# Patient Record
Sex: FEMALE | Race: WHITE | NOT HISPANIC OR LATINO | Employment: OTHER
[De-identification: names, ages, dates, MRNs, and addresses within clinical notes are randomized per-mention and may not be internally consistent; named-entity substitution may affect disease eponyms.]

---

## 2017-03-14 ENCOUNTER — RECORDS - HEALTHEAST (OUTPATIENT)
Dept: ADMINISTRATIVE | Facility: OTHER | Age: 62
End: 2017-03-14

## 2017-03-15 ENCOUNTER — RECORDS - HEALTHEAST (OUTPATIENT)
Dept: ADMINISTRATIVE | Facility: OTHER | Age: 62
End: 2017-03-15

## 2017-03-28 ENCOUNTER — HOSPITAL ENCOUNTER (OUTPATIENT)
Dept: CARDIOLOGY | Facility: CLINIC | Age: 62
Discharge: HOME | End: 2017-03-28

## 2017-05-05 ENCOUNTER — COMMUNICATION - HEALTHEAST (OUTPATIENT)
Dept: FAMILY MEDICINE | Facility: CLINIC | Age: 62
End: 2017-05-05

## 2017-05-12 ENCOUNTER — OFFICE VISIT - HEALTHEAST (OUTPATIENT)
Dept: FAMILY MEDICINE | Facility: CLINIC | Age: 62
End: 2017-05-12

## 2017-05-12 DIAGNOSIS — G43.909 MIGRAINE: ICD-10-CM

## 2017-05-12 DIAGNOSIS — E03.9 ACQUIRED HYPOTHYROIDISM: ICD-10-CM

## 2017-05-12 DIAGNOSIS — E55.9 VITAMIN D DEFICIENCY: ICD-10-CM

## 2017-05-12 DIAGNOSIS — F33.42 RECURRENT MAJOR DEPRESSIVE DISORDER, IN FULL REMISSION (H): ICD-10-CM

## 2017-05-12 DIAGNOSIS — F41.9 ANXIETY: ICD-10-CM

## 2017-05-12 DIAGNOSIS — G47.00 INSOMNIA: ICD-10-CM

## 2017-05-12 DIAGNOSIS — E78.5 HYPERLIPIDEMIA: ICD-10-CM

## 2017-05-12 DIAGNOSIS — B18.2 CHRONIC HEPATITIS C VIRUS INFECTION (H): ICD-10-CM

## 2017-05-12 LAB
CHOLEST SERPL-MCNC: 216 MG/DL
FASTING STATUS PATIENT QL REPORTED: NO
HDLC SERPL-MCNC: 51 MG/DL
LDLC SERPL CALC-MCNC: 148 MG/DL
TRIGL SERPL-MCNC: 86 MG/DL

## 2017-05-15 ENCOUNTER — COMMUNICATION - HEALTHEAST (OUTPATIENT)
Dept: FAMILY MEDICINE | Facility: CLINIC | Age: 62
End: 2017-05-15

## 2017-05-19 ENCOUNTER — RECORDS - HEALTHEAST (OUTPATIENT)
Dept: ADMINISTRATIVE | Facility: OTHER | Age: 62
End: 2017-05-19

## 2017-06-29 ENCOUNTER — COMMUNICATION - HEALTHEAST (OUTPATIENT)
Dept: FAMILY MEDICINE | Facility: CLINIC | Age: 62
End: 2017-06-29

## 2017-06-29 DIAGNOSIS — G47.00 INSOMNIA: ICD-10-CM

## 2017-08-21 ENCOUNTER — COMMUNICATION - HEALTHEAST (OUTPATIENT)
Dept: FAMILY MEDICINE | Facility: CLINIC | Age: 62
End: 2017-08-21

## 2017-08-21 DIAGNOSIS — J44.9 COPD (CHRONIC OBSTRUCTIVE PULMONARY DISEASE) (H): ICD-10-CM

## 2017-12-18 ENCOUNTER — OFFICE VISIT - HEALTHEAST (OUTPATIENT)
Dept: FAMILY MEDICINE | Facility: CLINIC | Age: 62
End: 2017-12-18

## 2017-12-18 ENCOUNTER — RECORDS - HEALTHEAST (OUTPATIENT)
Dept: ADMINISTRATIVE | Facility: OTHER | Age: 62
End: 2017-12-18

## 2017-12-18 DIAGNOSIS — Z12.31 ENCOUNTER FOR SCREENING MAMMOGRAM FOR MALIGNANT NEOPLASM OF BREAST: ICD-10-CM

## 2017-12-18 DIAGNOSIS — B18.2 CHRONIC HEPATITIS C VIRUS INFECTION (H): ICD-10-CM

## 2017-12-18 DIAGNOSIS — E78.5 HYPERLIPIDEMIA, UNSPECIFIED HYPERLIPIDEMIA TYPE: ICD-10-CM

## 2017-12-18 DIAGNOSIS — Z12.11 ENCOUNTER FOR SCREENING FOR MALIGNANT NEOPLASM OF COLON: ICD-10-CM

## 2017-12-18 DIAGNOSIS — Z12.4 ENCOUNTER FOR SCREENING FOR MALIGNANT NEOPLASM OF CERVIX: ICD-10-CM

## 2017-12-18 DIAGNOSIS — Z00.00 MEDICARE ANNUAL WELLNESS VISIT, SUBSEQUENT: ICD-10-CM

## 2017-12-18 DIAGNOSIS — Z00.01 ENCOUNTER FOR GENERAL ADULT MEDICAL EXAMINATION WITH ABNORMAL FINDINGS: ICD-10-CM

## 2017-12-18 DIAGNOSIS — J44.9 COPD (CHRONIC OBSTRUCTIVE PULMONARY DISEASE) (H): ICD-10-CM

## 2017-12-18 DIAGNOSIS — Z78.0 POSTMENOPAUSAL: ICD-10-CM

## 2017-12-18 DIAGNOSIS — E03.9 ACQUIRED HYPOTHYROIDISM: ICD-10-CM

## 2017-12-18 DIAGNOSIS — R07.9 CHEST PAIN, UNSPECIFIED TYPE: ICD-10-CM

## 2017-12-18 LAB
CHOLEST SERPL-MCNC: 221 MG/DL
FASTING STATUS PATIENT QL REPORTED: YES
HDLC SERPL-MCNC: 49 MG/DL
LDLC SERPL CALC-MCNC: 156 MG/DL
TRIGL SERPL-MCNC: 81 MG/DL

## 2017-12-18 ASSESSMENT — MIFFLIN-ST. JEOR: SCORE: 1191.36

## 2017-12-20 LAB
HUMAN PAPILLOMA VIRUS 16 DNA: NEGATIVE
HUMAN PAPILLOMA VIRUS 18 DNA: NEGATIVE
HUMAN PAPILLOMA VIRUS FINAL DIAGNOSIS: NORMAL
HUMAN PAPILLOMA VIRUS OTHER HR: NEGATIVE
SPECIMEN DESCRIPTION: NORMAL

## 2017-12-21 ENCOUNTER — COMMUNICATION - HEALTHEAST (OUTPATIENT)
Dept: FAMILY MEDICINE | Facility: CLINIC | Age: 62
End: 2017-12-21

## 2017-12-22 LAB
BKR LAB AP ABNORMAL BLEEDING: NO
BKR LAB AP BIRTH CONTROL/HORMONES: NORMAL
BKR LAB AP CERVICAL APPEARANCE: NORMAL
BKR LAB AP GYN ADEQUACY: NORMAL
BKR LAB AP GYN INTERPRETATION: NORMAL
BKR LAB AP HPV REFLEX: NORMAL
BKR LAB AP LMP: NORMAL
BKR LAB AP PATIENT STATUS: NORMAL
BKR LAB AP PREVIOUS ABNORMAL: NO
BKR LAB AP PREVIOUS NORMAL: 2014
HIGH RISK?: NO
PATH REPORT.COMMENTS IMP SPEC: NORMAL
RESULT FLAG (HE HISTORICAL CONVERSION): NORMAL

## 2017-12-26 ENCOUNTER — COMMUNICATION - HEALTHEAST (OUTPATIENT)
Dept: FAMILY MEDICINE | Facility: CLINIC | Age: 62
End: 2017-12-26

## 2018-01-04 ENCOUNTER — HOSPITAL ENCOUNTER (OUTPATIENT)
Dept: CARDIOLOGY | Facility: CLINIC | Age: 63
Discharge: HOME | End: 2018-01-04
Attending: FAMILY MEDICINE

## 2018-01-05 ENCOUNTER — AMBULATORY - HEALTHEAST (OUTPATIENT)
Dept: FAMILY MEDICINE | Facility: CLINIC | Age: 63
End: 2018-01-05

## 2018-01-05 DIAGNOSIS — R06.09 DYSPNEA ON EXERTION: ICD-10-CM

## 2018-01-08 ENCOUNTER — RECORDS - HEALTHEAST (OUTPATIENT)
Dept: BONE DENSITY | Facility: CLINIC | Age: 63
End: 2018-01-08

## 2018-01-08 ENCOUNTER — RECORDS - HEALTHEAST (OUTPATIENT)
Dept: ADMINISTRATIVE | Facility: OTHER | Age: 63
End: 2018-01-08

## 2018-01-08 ENCOUNTER — HOSPITAL ENCOUNTER (OUTPATIENT)
Dept: MAMMOGRAPHY | Facility: CLINIC | Age: 63
Discharge: HOME | End: 2018-01-08
Attending: FAMILY MEDICINE

## 2018-01-08 DIAGNOSIS — Z78.0 ASYMPTOMATIC MENOPAUSAL STATE: ICD-10-CM

## 2018-01-08 DIAGNOSIS — Z12.31 ENCOUNTER FOR SCREENING MAMMOGRAM FOR MALIGNANT NEOPLASM OF BREAST: ICD-10-CM

## 2018-01-11 ENCOUNTER — COMMUNICATION - HEALTHEAST (OUTPATIENT)
Dept: FAMILY MEDICINE | Facility: CLINIC | Age: 63
End: 2018-01-11

## 2018-02-05 ENCOUNTER — HOSPITAL ENCOUNTER (OUTPATIENT)
Dept: NUCLEAR MEDICINE | Facility: CLINIC | Age: 63
Discharge: HOME | End: 2018-02-05
Attending: FAMILY MEDICINE

## 2018-02-05 ENCOUNTER — HOSPITAL ENCOUNTER (OUTPATIENT)
Dept: CARDIOLOGY | Facility: CLINIC | Age: 63
Discharge: HOME | End: 2018-02-05
Attending: FAMILY MEDICINE

## 2018-02-05 DIAGNOSIS — R07.9 CHEST PAIN, UNSPECIFIED TYPE: ICD-10-CM

## 2018-02-05 LAB
CV STRESS CURRENT BP HE: NORMAL
CV STRESS CURRENT HR HE: 102
CV STRESS CURRENT HR HE: 106
CV STRESS CURRENT HR HE: 108
CV STRESS CURRENT HR HE: 109
CV STRESS CURRENT HR HE: 117
CV STRESS CURRENT HR HE: 119
CV STRESS CURRENT HR HE: 121
CV STRESS CURRENT HR HE: 123
CV STRESS CURRENT HR HE: 133
CV STRESS CURRENT HR HE: 136
CV STRESS CURRENT HR HE: 136
CV STRESS CURRENT HR HE: 139
CV STRESS CURRENT HR HE: 139
CV STRESS CURRENT HR HE: 142
CV STRESS CURRENT HR HE: 69
CV STRESS CURRENT HR HE: 83
CV STRESS CURRENT HR HE: 83
CV STRESS CURRENT HR HE: 84
CV STRESS CURRENT HR HE: 86
CV STRESS CURRENT HR HE: 87
CV STRESS CURRENT HR HE: 87
CV STRESS CURRENT HR HE: 89
CV STRESS CURRENT HR HE: 90
CV STRESS CURRENT HR HE: 90
CV STRESS CURRENT HR HE: 92
CV STRESS CURRENT HR HE: 93
CV STRESS CURRENT HR HE: 95
CV STRESS CURRENT HR HE: NORMAL
CV STRESS DEVIATION TIME HE: NORMAL
CV STRESS ECHO PERCENT HR HE: NORMAL
CV STRESS EXERCISE STAGE HE: NORMAL
CV STRESS EXERCISE STAGE REACHED HE: NORMAL
CV STRESS FINAL RESTING BP HE: NORMAL
CV STRESS FINAL RESTING HR HE: 90
CV STRESS MAX HR HE: 142
CV STRESS MAX TREADMILL GRADE HE: 16
CV STRESS MAX TREADMILL SPEED HE: 4.2
CV STRESS PEAK DIA BP HE: NORMAL
CV STRESS PEAK SYS BP HE: NORMAL
CV STRESS PHASE HE: NORMAL
CV STRESS PROTOCOL HE: NORMAL
CV STRESS RESTING PT POSITION HE: NORMAL
CV STRESS RESTING PT POSITION HE: NORMAL
CV STRESS ST DEVIATION AMOUNT HE: NORMAL
CV STRESS ST DEVIATION ELEVATION HE: NORMAL
CV STRESS ST EVELATION AMOUNT HE: NORMAL
CV STRESS TEST TYPE HE: NORMAL
CV STRESS TOTAL STAGE TIME MIN 1 HE: NORMAL
NUC STRESS EJECTION FRACTION: 70 %
STRESS ECHO BASELINE BP: NORMAL
STRESS ECHO BASELINE HR: 68
STRESS ECHO CALCULATED PERCENT HR: 89 %
STRESS ECHO LAST STRESS BP: NORMAL
STRESS ECHO LAST STRESS HR: 142
STRESS ECHO POST ESTIMATED WORKLOAD: 12.1
STRESS ECHO POST EXERCISE DUR MIN: 11
STRESS ECHO POST EXERCISE DUR SEC: 18
STRESS ECHO TARGET HR: 136

## 2018-06-26 ENCOUNTER — COMMUNICATION - HEALTHEAST (OUTPATIENT)
Dept: FAMILY MEDICINE | Facility: CLINIC | Age: 63
End: 2018-06-26

## 2018-06-26 DIAGNOSIS — J44.9 COPD (CHRONIC OBSTRUCTIVE PULMONARY DISEASE) (H): ICD-10-CM

## 2018-07-07 ENCOUNTER — COMMUNICATION - HEALTHEAST (OUTPATIENT)
Dept: FAMILY MEDICINE | Facility: CLINIC | Age: 63
End: 2018-07-07

## 2018-07-07 DIAGNOSIS — G47.00 INSOMNIA: ICD-10-CM

## 2018-07-09 ENCOUNTER — COMMUNICATION - HEALTHEAST (OUTPATIENT)
Dept: FAMILY MEDICINE | Facility: CLINIC | Age: 63
End: 2018-07-09

## 2018-07-09 DIAGNOSIS — G47.00 INSOMNIA: ICD-10-CM

## 2018-07-20 ENCOUNTER — COMMUNICATION - HEALTHEAST (OUTPATIENT)
Dept: FAMILY MEDICINE | Facility: CLINIC | Age: 63
End: 2018-07-20

## 2018-07-20 DIAGNOSIS — J44.9 COPD (CHRONIC OBSTRUCTIVE PULMONARY DISEASE) (H): ICD-10-CM

## 2018-11-15 ENCOUNTER — RECORDS - HEALTHEAST (OUTPATIENT)
Dept: ADMINISTRATIVE | Facility: OTHER | Age: 63
End: 2018-11-15

## 2019-03-22 ENCOUNTER — COMMUNICATION - HEALTHEAST (OUTPATIENT)
Dept: FAMILY MEDICINE | Facility: CLINIC | Age: 64
End: 2019-03-22

## 2019-05-01 ENCOUNTER — OFFICE VISIT - HEALTHEAST (OUTPATIENT)
Dept: FAMILY MEDICINE | Facility: CLINIC | Age: 64
End: 2019-05-01

## 2019-05-01 DIAGNOSIS — F33.42 RECURRENT MAJOR DEPRESSIVE DISORDER, IN FULL REMISSION (H): ICD-10-CM

## 2019-05-01 DIAGNOSIS — J44.9 COPD (CHRONIC OBSTRUCTIVE PULMONARY DISEASE) (H): ICD-10-CM

## 2019-05-01 DIAGNOSIS — B18.2 CHRONIC HEPATITIS C WITHOUT HEPATIC COMA (H): ICD-10-CM

## 2019-05-01 DIAGNOSIS — R21 RASH AND NONSPECIFIC SKIN ERUPTION: ICD-10-CM

## 2019-05-01 LAB
ALBUMIN SERPL-MCNC: 3.9 G/DL (ref 3.5–5)
ALP SERPL-CCNC: 75 U/L (ref 45–120)
ALT SERPL W P-5'-P-CCNC: 29 U/L (ref 0–45)
AST SERPL W P-5'-P-CCNC: 29 U/L (ref 0–40)
BASOPHILS # BLD AUTO: 0.1 THOU/UL (ref 0–0.2)
BASOPHILS NFR BLD AUTO: 1 % (ref 0–2)
BILIRUB DIRECT SERPL-MCNC: 0.2 MG/DL
BILIRUB SERPL-MCNC: 0.3 MG/DL (ref 0–1)
EOSINOPHIL # BLD AUTO: 0.2 THOU/UL (ref 0–0.4)
EOSINOPHIL NFR BLD AUTO: 3 % (ref 0–6)
ERYTHROCYTE [DISTWIDTH] IN BLOOD BY AUTOMATED COUNT: 11.7 % (ref 11–14.5)
HCT VFR BLD AUTO: 44.6 % (ref 35–47)
HGB BLD-MCNC: 14.8 G/DL (ref 12–16)
LYMPHOCYTES # BLD AUTO: 2 THOU/UL (ref 0.8–4.4)
LYMPHOCYTES NFR BLD AUTO: 25 % (ref 20–40)
MCH RBC QN AUTO: 31.4 PG (ref 27–34)
MCHC RBC AUTO-ENTMCNC: 33.2 G/DL (ref 32–36)
MCV RBC AUTO: 95 FL (ref 80–100)
MONOCYTES # BLD AUTO: 0.5 THOU/UL (ref 0–0.9)
MONOCYTES NFR BLD AUTO: 6 % (ref 2–10)
NEUTROPHILS # BLD AUTO: 5.3 THOU/UL (ref 2–7.7)
NEUTROPHILS NFR BLD AUTO: 66 % (ref 50–70)
PLATELET # BLD AUTO: 278 THOU/UL (ref 140–440)
PMV BLD AUTO: 8.2 FL (ref 7–10)
PROT SERPL-MCNC: 6.9 G/DL (ref 6–8)
RBC # BLD AUTO: 4.71 MILL/UL (ref 3.8–5.4)
WBC: 8.1 THOU/UL (ref 4–11)

## 2019-05-01 ASSESSMENT — MIFFLIN-ST. JEOR: SCORE: 1191.13

## 2019-05-02 LAB
HBV SURFACE AG SERPL QL IA: NEGATIVE
HCV AB SERPL QL IA: POSITIVE
HEPATITIS B SURFACE ANTIBODY LHE- HISTORICAL: NEGATIVE

## 2019-05-06 LAB
HCV RNA SERPL NAA+PROBE-ACNC: ABNORMAL [IU]/ML
HCV RNA SERPL NAA+PROBE-LOG IU: 6.8 LOG IU/ML

## 2019-05-09 ENCOUNTER — COMMUNICATION - HEALTHEAST (OUTPATIENT)
Dept: FAMILY MEDICINE | Facility: CLINIC | Age: 64
End: 2019-05-09

## 2019-05-09 LAB — HCV GENTYP SERPL NAA+PROBE: NORMAL

## 2019-05-14 ENCOUNTER — OFFICE VISIT - HEALTHEAST (OUTPATIENT)
Dept: FAMILY MEDICINE | Facility: CLINIC | Age: 64
End: 2019-05-14

## 2019-05-14 DIAGNOSIS — G89.29 CHRONIC RIGHT SHOULDER PAIN: ICD-10-CM

## 2019-05-14 DIAGNOSIS — S29.019A ACUTE THORACIC MYOFASCIAL STRAIN, INITIAL ENCOUNTER: ICD-10-CM

## 2019-05-14 DIAGNOSIS — M25.511 CHRONIC RIGHT SHOULDER PAIN: ICD-10-CM

## 2019-05-15 ENCOUNTER — COMMUNICATION - HEALTHEAST (OUTPATIENT)
Dept: FAMILY MEDICINE | Facility: CLINIC | Age: 64
End: 2019-05-15

## 2019-06-06 ENCOUNTER — RECORDS - HEALTHEAST (OUTPATIENT)
Dept: ADMINISTRATIVE | Facility: OTHER | Age: 64
End: 2019-06-06

## 2019-06-07 ENCOUNTER — RECORDS - HEALTHEAST (OUTPATIENT)
Dept: ADMINISTRATIVE | Facility: OTHER | Age: 64
End: 2019-06-07

## 2019-07-09 ENCOUNTER — RECORDS - HEALTHEAST (OUTPATIENT)
Dept: GENERAL RADIOLOGY | Facility: CLINIC | Age: 64
End: 2019-07-09

## 2019-07-09 ENCOUNTER — OFFICE VISIT - HEALTHEAST (OUTPATIENT)
Dept: FAMILY MEDICINE | Facility: CLINIC | Age: 64
End: 2019-07-09

## 2019-07-09 ENCOUNTER — COMMUNICATION - HEALTHEAST (OUTPATIENT)
Dept: FAMILY MEDICINE | Facility: CLINIC | Age: 64
End: 2019-07-09

## 2019-07-09 DIAGNOSIS — R07.81 PLEURODYNIA: ICD-10-CM

## 2019-07-09 DIAGNOSIS — R07.81 RIB PAIN: ICD-10-CM

## 2019-07-09 ASSESSMENT — MIFFLIN-ST. JEOR: SCORE: 1164.14

## 2019-12-04 ENCOUNTER — OFFICE VISIT - HEALTHEAST (OUTPATIENT)
Dept: FAMILY MEDICINE | Facility: CLINIC | Age: 64
End: 2019-12-04

## 2019-12-04 ENCOUNTER — COMMUNICATION - HEALTHEAST (OUTPATIENT)
Dept: SCHEDULING | Facility: CLINIC | Age: 64
End: 2019-12-04

## 2019-12-04 DIAGNOSIS — S29.011A INTERCOSTAL MUSCLE STRAIN, INITIAL ENCOUNTER: ICD-10-CM

## 2019-12-04 DIAGNOSIS — Z23 NEED FOR VACCINATION: ICD-10-CM

## 2019-12-04 DIAGNOSIS — J06.9 VIRAL UPPER RESPIRATORY TRACT INFECTION: ICD-10-CM

## 2019-12-11 ENCOUNTER — COMMUNICATION - HEALTHEAST (OUTPATIENT)
Dept: SCHEDULING | Facility: CLINIC | Age: 64
End: 2019-12-11

## 2019-12-11 ENCOUNTER — OFFICE VISIT - HEALTHEAST (OUTPATIENT)
Dept: FAMILY MEDICINE | Facility: CLINIC | Age: 64
End: 2019-12-11

## 2019-12-11 DIAGNOSIS — J44.9 CHRONIC OBSTRUCTIVE PULMONARY DISEASE, UNSPECIFIED COPD TYPE (H): ICD-10-CM

## 2019-12-11 DIAGNOSIS — Z72.0 TOBACCO ABUSE: ICD-10-CM

## 2019-12-11 DIAGNOSIS — R05.9 COUGH: ICD-10-CM

## 2019-12-11 ASSESSMENT — MIFFLIN-ST. JEOR: SCORE: 1181.38

## 2020-01-06 ENCOUNTER — RECORDS - HEALTHEAST (OUTPATIENT)
Dept: ADMINISTRATIVE | Facility: OTHER | Age: 65
End: 2020-01-06

## 2020-01-07 ENCOUNTER — RECORDS - HEALTHEAST (OUTPATIENT)
Dept: ADMINISTRATIVE | Facility: OTHER | Age: 65
End: 2020-01-07

## 2020-01-10 ENCOUNTER — OFFICE VISIT - HEALTHEAST (OUTPATIENT)
Dept: FAMILY MEDICINE | Facility: CLINIC | Age: 65
End: 2020-01-10

## 2020-01-10 DIAGNOSIS — B18.2 CHRONIC HEPATITIS C WITHOUT HEPATIC COMA (H): ICD-10-CM

## 2020-01-10 DIAGNOSIS — K21.9 GASTROESOPHAGEAL REFLUX DISEASE WITHOUT ESOPHAGITIS: ICD-10-CM

## 2020-01-10 DIAGNOSIS — J44.9 CHRONIC OBSTRUCTIVE PULMONARY DISEASE, UNSPECIFIED COPD TYPE (H): ICD-10-CM

## 2020-01-10 DIAGNOSIS — R94.6 ABNORMAL FINDING ON THYROID FUNCTION TEST: ICD-10-CM

## 2020-01-10 DIAGNOSIS — J01.90 ACUTE NON-RECURRENT SINUSITIS, UNSPECIFIED LOCATION: ICD-10-CM

## 2020-01-10 LAB — TSH SERPL DL<=0.005 MIU/L-ACNC: 2.25 UIU/ML (ref 0.3–5)

## 2020-06-10 ENCOUNTER — COMMUNICATION - HEALTHEAST (OUTPATIENT)
Dept: FAMILY MEDICINE | Facility: CLINIC | Age: 65
End: 2020-06-10

## 2020-06-10 DIAGNOSIS — J44.9 COPD (CHRONIC OBSTRUCTIVE PULMONARY DISEASE) (H): ICD-10-CM

## 2020-06-12 ENCOUNTER — COMMUNICATION - HEALTHEAST (OUTPATIENT)
Dept: FAMILY MEDICINE | Facility: CLINIC | Age: 65
End: 2020-06-12

## 2020-06-12 DIAGNOSIS — J44.9 COPD (CHRONIC OBSTRUCTIVE PULMONARY DISEASE) (H): ICD-10-CM

## 2020-07-07 ENCOUNTER — COMMUNICATION - HEALTHEAST (OUTPATIENT)
Dept: TELEHEALTH | Facility: CLINIC | Age: 65
End: 2020-07-07

## 2020-07-07 ENCOUNTER — OFFICE VISIT - HEALTHEAST (OUTPATIENT)
Dept: FAMILY MEDICINE | Facility: CLINIC | Age: 65
End: 2020-07-07

## 2020-07-07 DIAGNOSIS — Z79.82 ASPIRIN LONG-TERM USE: ICD-10-CM

## 2020-07-07 DIAGNOSIS — J30.2 SEASONAL ALLERGIC RHINITIS, UNSPECIFIED TRIGGER: ICD-10-CM

## 2020-07-07 DIAGNOSIS — J44.9 COPD (CHRONIC OBSTRUCTIVE PULMONARY DISEASE) (H): ICD-10-CM

## 2020-07-07 DIAGNOSIS — J44.1 COPD EXACERBATION (H): ICD-10-CM

## 2020-07-07 DIAGNOSIS — F17.200 TOBACCO DEPENDENCE: ICD-10-CM

## 2020-08-18 ENCOUNTER — COMMUNICATION - HEALTHEAST (OUTPATIENT)
Dept: FAMILY MEDICINE | Facility: CLINIC | Age: 65
End: 2020-08-18

## 2020-08-18 DIAGNOSIS — J44.1 COPD EXACERBATION (H): ICD-10-CM

## 2020-08-19 ENCOUNTER — OFFICE VISIT - HEALTHEAST (OUTPATIENT)
Dept: FAMILY MEDICINE | Facility: CLINIC | Age: 65
End: 2020-08-19

## 2020-08-19 DIAGNOSIS — J30.2 SEASONAL ALLERGIC RHINITIS, UNSPECIFIED TRIGGER: ICD-10-CM

## 2020-08-19 DIAGNOSIS — F17.200 TOBACCO DEPENDENCE: ICD-10-CM

## 2020-08-19 DIAGNOSIS — J44.1 COPD EXACERBATION (H): ICD-10-CM

## 2020-08-19 ASSESSMENT — ANXIETY QUESTIONNAIRES
7. FEELING AFRAID AS IF SOMETHING AWFUL MIGHT HAPPEN: NOT AT ALL
3. WORRYING TOO MUCH ABOUT DIFFERENT THINGS: SEVERAL DAYS
6. BECOMING EASILY ANNOYED OR IRRITABLE: NOT AT ALL
GAD7 TOTAL SCORE: 1
1. FEELING NERVOUS, ANXIOUS, OR ON EDGE: NOT AT ALL
5. BEING SO RESTLESS THAT IT IS HARD TO SIT STILL: NOT AT ALL
4. TROUBLE RELAXING: NOT AT ALL
IF YOU CHECKED OFF ANY PROBLEMS ON THIS QUESTIONNAIRE, HOW DIFFICULT HAVE THESE PROBLEMS MADE IT FOR YOU TO DO YOUR WORK, TAKE CARE OF THINGS AT HOME, OR GET ALONG WITH OTHER PEOPLE: NOT DIFFICULT AT ALL
2. NOT BEING ABLE TO STOP OR CONTROL WORRYING: NOT AT ALL

## 2020-08-19 ASSESSMENT — PATIENT HEALTH QUESTIONNAIRE - PHQ9: SUM OF ALL RESPONSES TO PHQ QUESTIONS 1-9: 1

## 2020-10-26 ENCOUNTER — OFFICE VISIT - HEALTHEAST (OUTPATIENT)
Dept: FAMILY MEDICINE | Facility: CLINIC | Age: 65
End: 2020-10-26

## 2020-10-26 DIAGNOSIS — F17.200 TOBACCO DEPENDENCE: ICD-10-CM

## 2020-10-26 DIAGNOSIS — Z23 NEED FOR VACCINATION: ICD-10-CM

## 2020-10-26 DIAGNOSIS — R06.09 DOE (DYSPNEA ON EXERTION): ICD-10-CM

## 2020-10-26 DIAGNOSIS — J30.2 SEASONAL ALLERGIC RHINITIS, UNSPECIFIED TRIGGER: ICD-10-CM

## 2020-10-26 DIAGNOSIS — Z87.09 HISTORY OF COPD: ICD-10-CM

## 2020-10-26 DIAGNOSIS — J44.1 COPD EXACERBATION (H): ICD-10-CM

## 2020-10-26 DIAGNOSIS — Z12.31 ENCOUNTER FOR SCREENING MAMMOGRAM FOR BREAST CANCER: ICD-10-CM

## 2020-10-26 RX ORDER — ALBUTEROL SULFATE 0.83 MG/ML
2.5 SOLUTION RESPIRATORY (INHALATION) EVERY 4 HOURS PRN
Qty: 25 VIAL | Refills: 7 | Status: SHIPPED | OUTPATIENT
Start: 2020-10-26 | End: 2022-11-08

## 2020-11-09 ENCOUNTER — COMMUNICATION - HEALTHEAST (OUTPATIENT)
Dept: FAMILY MEDICINE | Facility: CLINIC | Age: 65
End: 2020-11-09

## 2020-11-09 DIAGNOSIS — Z20.822 ENCOUNTER FOR LABORATORY TESTING FOR COVID-19 VIRUS: ICD-10-CM

## 2020-11-15 ENCOUNTER — AMBULATORY - HEALTHEAST (OUTPATIENT)
Dept: FAMILY MEDICINE | Facility: CLINIC | Age: 65
End: 2020-11-15

## 2020-11-15 DIAGNOSIS — Z20.822 ENCOUNTER FOR LABORATORY TESTING FOR COVID-19 VIRUS: ICD-10-CM

## 2020-11-18 ENCOUNTER — COMMUNICATION - HEALTHEAST (OUTPATIENT)
Dept: SCHEDULING | Facility: CLINIC | Age: 65
End: 2020-11-18

## 2020-11-19 ENCOUNTER — COMMUNICATION - HEALTHEAST (OUTPATIENT)
Dept: SCHEDULING | Facility: CLINIC | Age: 65
End: 2020-11-19

## 2021-05-19 ENCOUNTER — RECORDS - HEALTHEAST (OUTPATIENT)
Dept: ADMINISTRATIVE | Facility: OTHER | Age: 66
End: 2021-05-19

## 2021-05-19 ENCOUNTER — RECORDS - HEALTHEAST (OUTPATIENT)
Dept: GENERAL RADIOLOGY | Facility: CLINIC | Age: 66
End: 2021-05-19

## 2021-05-19 ENCOUNTER — OFFICE VISIT - HEALTHEAST (OUTPATIENT)
Dept: FAMILY MEDICINE | Facility: CLINIC | Age: 66
End: 2021-05-19

## 2021-05-19 DIAGNOSIS — J44.9 COPD (CHRONIC OBSTRUCTIVE PULMONARY DISEASE) (H): ICD-10-CM

## 2021-05-19 DIAGNOSIS — F13.10 BENZODIAZEPINE ABUSE (H): ICD-10-CM

## 2021-05-19 DIAGNOSIS — R07.89 ATYPICAL CHEST PAIN: ICD-10-CM

## 2021-05-19 DIAGNOSIS — M54.12 RADICULOPATHY, CERVICAL REGION: ICD-10-CM

## 2021-05-19 DIAGNOSIS — Z12.11 SCREEN FOR COLON CANCER: ICD-10-CM

## 2021-05-19 DIAGNOSIS — F17.200 TOBACCO DEPENDENCE: ICD-10-CM

## 2021-05-19 DIAGNOSIS — I10 BENIGN ESSENTIAL HYPERTENSION: ICD-10-CM

## 2021-05-19 DIAGNOSIS — M54.12 RADICULITIS, CERVICAL: ICD-10-CM

## 2021-05-19 LAB
ATRIAL RATE - MUSE: 66 BPM
DIASTOLIC BLOOD PRESSURE - MUSE: NORMAL
INTERPRETATION ECG - MUSE: NORMAL
P AXIS - MUSE: 37 DEGREES
PR INTERVAL - MUSE: 170 MS
QRS DURATION - MUSE: 88 MS
QT - MUSE: 398 MS
QTC - MUSE: 417 MS
R AXIS - MUSE: 32 DEGREES
SYSTOLIC BLOOD PRESSURE - MUSE: NORMAL
T AXIS - MUSE: 37 DEGREES
VENTRICULAR RATE- MUSE: 66 BPM

## 2021-05-19 RX ORDER — AMLODIPINE BESYLATE 5 MG/1
5 TABLET ORAL DAILY
Qty: 90 TABLET | Refills: 3 | Status: SHIPPED | OUTPATIENT
Start: 2021-05-19 | End: 2021-10-27

## 2021-05-19 RX ORDER — ALBUTEROL SULFATE 90 UG/1
AEROSOL, METERED RESPIRATORY (INHALATION)
Qty: 54 G | Refills: 3 | Status: SHIPPED | OUTPATIENT
Start: 2021-05-19 | End: 2021-10-27

## 2021-05-19 ASSESSMENT — ANXIETY QUESTIONNAIRES
4. TROUBLE RELAXING: NOT AT ALL
6. BECOMING EASILY ANNOYED OR IRRITABLE: NOT AT ALL
4. TROUBLE RELAXING: NOT AT ALL
1. FEELING NERVOUS, ANXIOUS, OR ON EDGE: NOT AT ALL
GAD7 TOTAL SCORE: 0
2. NOT BEING ABLE TO STOP OR CONTROL WORRYING: NOT AT ALL
3. WORRYING TOO MUCH ABOUT DIFFERENT THINGS: NOT AT ALL
6. BECOMING EASILY ANNOYED OR IRRITABLE: NOT AT ALL
IF YOU CHECKED OFF ANY PROBLEMS ON THIS QUESTIONNAIRE, HOW DIFFICULT HAVE THESE PROBLEMS MADE IT FOR YOU TO DO YOUR WORK, TAKE CARE OF THINGS AT HOME, OR GET ALONG WITH OTHER PEOPLE: NOT DIFFICULT AT ALL
2. NOT BEING ABLE TO STOP OR CONTROL WORRYING: NOT AT ALL
5. BEING SO RESTLESS THAT IT IS HARD TO SIT STILL: NOT AT ALL
7. FEELING AFRAID AS IF SOMETHING AWFUL MIGHT HAPPEN: NOT AT ALL
7. FEELING AFRAID AS IF SOMETHING AWFUL MIGHT HAPPEN: NOT AT ALL
IF YOU CHECKED OFF ANY PROBLEMS ON THIS QUESTIONNAIRE, HOW DIFFICULT HAVE THESE PROBLEMS MADE IT FOR YOU TO DO YOUR WORK, TAKE CARE OF THINGS AT HOME, OR GET ALONG WITH OTHER PEOPLE: NOT DIFFICULT AT ALL
3. WORRYING TOO MUCH ABOUT DIFFERENT THINGS: NOT AT ALL
1. FEELING NERVOUS, ANXIOUS, OR ON EDGE: NOT AT ALL
5. BEING SO RESTLESS THAT IT IS HARD TO SIT STILL: NOT AT ALL

## 2021-05-19 ASSESSMENT — MIFFLIN-ST. JEOR: SCORE: 1166.07

## 2021-05-19 ASSESSMENT — PATIENT HEALTH QUESTIONNAIRE - PHQ9: SUM OF ALL RESPONSES TO PHQ QUESTIONS 1-9: 2

## 2021-05-27 ENCOUNTER — RECORDS - HEALTHEAST (OUTPATIENT)
Dept: ADMINISTRATIVE | Facility: CLINIC | Age: 66
End: 2021-05-27

## 2021-05-27 VITALS — WEIGHT: 147 LBS | BODY MASS INDEX: 27.1 KG/M2 | HEIGHT: 62 IN

## 2021-05-27 VITALS
DIASTOLIC BLOOD PRESSURE: 94 MMHG | SYSTOLIC BLOOD PRESSURE: 180 MMHG | OXYGEN SATURATION: 97 % | HEART RATE: 73 BPM | DIASTOLIC BLOOD PRESSURE: 96 MMHG | SYSTOLIC BLOOD PRESSURE: 168 MMHG

## 2021-05-28 ENCOUNTER — RECORDS - HEALTHEAST (OUTPATIENT)
Dept: ADMINISTRATIVE | Facility: CLINIC | Age: 66
End: 2021-05-28

## 2021-05-28 ASSESSMENT — ANXIETY QUESTIONNAIRES: GAD7 TOTAL SCORE: 1

## 2021-05-28 NOTE — PROGRESS NOTES
Assessment & Plan:     1. Acute thoracic myofascial strain, initial encounter  cyclobenzaprine (FLEXERIL) 10 MG tablet   2. Chronic right shoulder pain           We reviewed the likely/potential etiology(ies) for her shoulder and upper back pain symptoms and we will have her begin a trial of flexeril as directed, and I advised her to begin oral aleve on a scheduled basis for the next 5-7 days, then prn. We reviewed activity as tolerated and use of heat and/or ice [id number] for comfort. She will call or return to clinic with any ongoing or worsening symptoms. I explained that this may be her shoulder acting up and would recommend ortho evaluation for ongoing or worsening symptoms.          Subjective:         Nettie Paul is a 64 y.o. female who presents for evaluation of right upper back and chest pain. Symptoms have been present for 3 days and are gradually worsening. It was severe on awakening yest and she slept poorly last night due to the pain. She had been planting and mulching on Mother's day, then picked up baby and felt sore. She does not recall a specific injury but does have a history of chronic neck and back pain as well as a history of right shoulder pain and brachial plexus injury. She is s/p right shoulder surgery in 2011. She has been seeing [facility] for the shoulder under work comp for years, but reports that this doesn't feel like her shoulder pain. She was last seen there in October 2018 with left shoulder symptoms. The pain is worse with coughing and she needs to splint the area when she coughs. She tried some aleve, tylenol and a pain patch over the area without much change.       Hx neck fx and chronic back pain - Has had issues with opioids and benzos in the past and does not want to take any of those. She has COPD with a chronic cough, and some recent allergy symptoms. She is working on stopping smoking.       The following portions of the patient's history were reviewed and  updated as appropriate: allergies, current medications, past family history, past medical history, past social history, past surgical history and problem list.    Review of Systems  A 12 point comprehensive review of systems was negative except as noted.      Objective:      Vitals:    05/14/19 0958   BP: 118/68   Patient Position: Sitting   Cuff Size: Adult Large   Pulse: 68   SpO2: 97%   Weight: 148 lb (67.1 kg)     GEN: Alert and oriented, NAD, well nourished  SKIN:  Normal skin turgor, no lesions/rashes   HEENT: NC/AT, moist mucous membranes, no rhinorrhea.    NECK: Normal.  No adenopathy or thyromegaly.  CV: Regular rate and rhythm, no murmurs.   LUNGS: Clear to auscultation bilaterally.    ABDOMEN: Soft, non-tender, non-distended, no masses   BACK: Normal  EXTREMITY: No edema, cyanosis. Right AC and biceps tenderness, mild impingement signs.   Full ROM at shoulder but some pain with int/ext rotation.  NEURO: Grossly normal.

## 2021-05-28 NOTE — PROGRESS NOTES
ASSESSMENT/PLAN:       1. Recurrent major depressive disorder, in full remission (H)  Encouraged her on her ability to stop her alcohol use and her active engagement in smoking cessation.  I think it is best that she is off of her chronic opioids and also benzodiazepines.    2. Chronic hepatitis C without hepatic coma (H)    - Hepatitis C Antibody (Anti-HCV)  - Hepatitis B Surface Antigen (HBsAG)  - Hepatitis B Surface Antibody (Anti-HBs)  - Hepatic Profile  - Hepatitis C RNA Quantitation by RT-PCR  - Hepatitis C Virus High-Resolution Genotype by Sequencing  - 1(CBC and Differential)  - Ambulatory referral to Gastroenterology  - [facility] (CBC with Diff)  Referral to [facility] to see if she might be a candidate for treatment of chronic hepatitis C.  There may be other testing that they want to do prior to making that decision is certainly she would need to make sure that her insurance will cover the treatment.  She does have Medicare and a Blue Cross supplement    3. COPD (chronic obstructive pulmonary disease) (H)    - albuterol (VENTOLIN HFA) 90 mcg/actuation inhaler; Inhale 2 puffs every 6 (six) hours as needed for wheezing.  Dispense: 18 g; Refill: 11  Encouraged her on her smoking cessation    4. Rash and nonspecific skin eruption  I explained to her that I did not feel that this rash was likely a herpes zoster or herpes simplex infection because primarily had some itching and not pain and it also is almost gone now about 5 days after it started.  Encouraged her to use some hydrocortisone cream over-the-counter as needed for itching and a good moisturizer cream    Test results by letter and less red flag results then call her        I have counseled the patient for tobacco cessation and the follow up will occur  at the next visit.    Martín Marie MD      PROGRESS NOTE   5/1/2019    SUBJECTIVE:  Nettie Paul is a 64 y.o. female  who presents for   Chief Complaint   Patient presents with     Medication  Problem     refill for inhaler     Hepatitis C     wants to talk about Hep C     Herpes Zoster     wants to talk about shingles       1. Recurrent major depressive disorder, in full remission (H)  The patient has had a history of depression but is in full remission and her PHQ 9 was completed and in the normal range.  She is not not drinking any alcohol chronic pain medications.    2. Chronic hepatitis C without hepatic coma (H)  The patient has a concern about her chronic hepatitis C  A number of years ago she did take the treatment for hepatitis C for about a year I suspect it was interferon and it was not effective and had a lot of side effects.  She is aware of the new medication and is wondering if she would be a candidate to try that.  I note that last fall she had normal AST and ALT.  She does not have any right upper quadrant abdominal pain and her appetite and weight have been stable.    3. COPD (chronic obstructive pulmonary disease) (H)  The patient is needing a refill on her albuterol.  She is having some coughing and wheezing and has been out of her albuterol.  She has cut back from a pack of cigarettes a day to 3 cigarettes.  The cough has some productive colored sputum but that is really not too much out of the ordinary for her.    4. Rash and nonspecific skin eruption  About 5 days ago the patient had a rash on the with some itching no pain and she is concerned that it might be shingles.  It does seem to be getting better.  She is never had shingles before  Patient Active Problem List   Diagnosis     Chronic Hepatitis, C Virus     Hyperlipidemia     Hypothyroidism     Vitamin D deficiency     Tobacco dependence     COPD (chronic obstructive pulmonary disease) (H)     Major depression, recurrent (H)     Anxiety     Insomnia     Alcohol abuse     Opioid abuse (H)     Benzodiazepine abuse (H)     Migraine     Osteopenia of multiple sites       Current Outpatient Medications   Medication Sig Dispense  "Refill     acetaminophen (TYLENOL) 325 MG tablet Take 650 mg by mouth every 6 (six) hours as needed for pain.       albuterol (VENTOLIN HFA) 90 mcg/actuation inhaler Inhale 2 puffs every 6 (six) hours as needed for wheezing. 18 g 11     No current facility-administered medications for this visit.        Social History     Tobacco Use   Smoking Status Current Some Day Smoker     Packs/day: 0.50     Types: Cigarettes     Last attempt to quit: 2016     Years since quittin.7   Smokeless Tobacco Never Used   Tobacco Comment    using patches in between; given smokiing cessation information           OBJECTIVE:        Recent Results (from the past 240 hour(s))   HM1 (CBC with Diff)   Result Value Ref Range    WBC 8.1 4.0 - 11.0 thou/uL    RBC 4.71 3.80 - 5.40 mill/uL    Hemoglobin 14.8 12.0 - 16.0 g/dL    Hematocrit 44.6 35.0 - 47.0 %    MCV 95 80 - 100 fL    MCH 31.4 27.0 - 34.0 pg    MCHC 33.2 32.0 - 36.0 g/dL    RDW 11.7 11.0 - 14.5 %    Platelets 278 140 - 440 thou/uL    MPV 8.2 7.0 - 10.0 fL    Neutrophils % 66 50 - 70 %    Lymphocytes % 25 20 - 40 %    Monocytes % 6 2 - 10 %    Eosinophils % 3 0 - 6 %    Basophils % 1 0 - 2 %    Neutrophils Absolute 5.3 2.0 - 7.7 thou/uL    Lymphocytes Absolute 2.0 0.8 - 4.4 thou/uL    Monocytes Absolute 0.5 0.0 - 0.9 thou/uL    Eosinophils Absolute 0.2 0.0 - 0.4 thou/uL    Basophils Absolute 0.1 0.0 - 0.2 thou/uL       Vitals:    19 0904 19 0909   BP: 120/90 122/85   Pulse: 67    SpO2: 97%    Weight: 149 lb 14.4 oz (68 kg)    Height: 5' 2.5\" (1.588 m)      Weight: 149 lb 14.4 oz (68 kg)          Physical Exam:  GENERAL APPEARANCE: 64-year-old female very pleasant, NAD, well hydrated, well nourished  SKIN:  Normal skin turgor, rash on the left buttock reveals 3 or 4 crusty areas with no surrounding erythema and no drainage.  They really look like they are resolving.  CV: RRR, no M/G/R   LUNGS: Symmetrically decreased breath sounds with some rhonchi and scattered " expiratory wheezing.  ABDOMEN: S&NT, no masses or enlarged organs   EXTREMITY: no edema and full ROM of all joints  NEURO: no focal findings

## 2021-05-28 NOTE — TELEPHONE ENCOUNTER
Central PA team  [phone]  Pool: HE PA MED ([id number])          PA has been initiated.       PA form completed and faxed insurance via Cover My Meds     Key:  [id number]     Medication:  CYCLOBENZAPRINE 10MG    Insurance:  [employer]        Response will be received via fax and may take up to 5-10 business days depending on plan

## 2021-05-28 NOTE — TELEPHONE ENCOUNTER
Fax received from [facility], they have started the Prior Authorization Process via Cover My Meds    CoverMyMeds Key: [id number]    Medication Name:   cyclobenzaprine (FLEXERIL) 10 MG tablet 45 tablet 1 5/14/2019     Sig - Route: Take 1 tablet (10 mg total) by mouth 3 (three) times a day as needed for muscle spasms. - Oral          Insurance Plan: BC   PBM:   Patient ID: not provided on fax    Please complete the PA process

## 2021-05-30 ENCOUNTER — RECORDS - HEALTHEAST (OUTPATIENT)
Dept: ADMINISTRATIVE | Facility: CLINIC | Age: 66
End: 2021-05-30

## 2021-05-30 NOTE — TELEPHONE ENCOUNTER
Tell patient that there is no evidence of a rib fracture and that it most likely represents a bruised rib or a fracture that we cannot see, as we discussed in the office.  Recommended over-the-counter analgesics, rest and notify us if symptoms of infection or pneumonia occur.

## 2021-05-30 NOTE — PROGRESS NOTES
"Chief Complaint   Patient presents with     Pain     slipped and falled on her right rib. started on the 4th       HPI: 64-year-old female presents today with right rib pain.  She slipped mechanically while playing with her grandchildren on July 4 and continues to have pain in her right rib.  There is been no shortness of breath.  She continues to struggle with COPD and has taken inhalers occasionally which is common for her.    In addition, she has seen gastroenterology and tells me that she starting on therapy for her hepatitis C.    ROS: No fever.  No sputum production.  No shortness of breath.    SH:    reports that she has been smoking cigarettes.  She has been smoking about 0.50 packs per day. She has never used smokeless tobacco. She reports that she does not drink alcohol or use drugs.      FH: The Patient's family history includes Alcohol abuse in her father and mother; Cirrhosis in her mother; Coronary artery disease in her father and sister; Hypertension in her brother; Seizures in her sister.     Meds:  Nettie has a current medication list which includes the following prescription(s): acetaminophen, albuterol, and cyclobenzaprine.    O:  /80   Pulse 73   Temp 98.1  F (36.7  C)   Ht 5' 3\" (1.6 m)   Wt 142 lb 3.2 oz (64.5 kg)   SpO2 98%   BMI 25.19 kg/m       Lungs--Clear to Auscultation with only occasional adventitious sounds.  Heart--Regular rate and rhythm  Abdomen--Soft, non-tender, non-distended  Skin-Pink and dry   Thorax-pain to palpation over the right fifth or sixth rib at the mid axillary line.  Pain is both with AP and lateral compression.    Review of the x-ray of the chest and ribs show no evidence of fracture space or soft tissue swelling    A/P:   1. Rib pain  The patient most likely has a rib fracture.  Discussed over-the-counter analgesics, and alerted her to signs and symptoms of possible pneumonia.  If those should occur at any time she should return.  Patient will follow-up " as needed      2.  Hepatitis C  -Continue following up with GI concerning the use of medications.

## 2021-05-30 NOTE — TELEPHONE ENCOUNTER
Who is calling:  Patient   Reason for Call:  Patient is wondering if her results are in and what she should do about her rib  Date of last appointment with primary care: 07/09/19  Okay to leave a detailed message: Yes

## 2021-05-31 VITALS — BODY MASS INDEX: 27.05 KG/M2 | WEIGHT: 147.9 LBS

## 2021-05-31 VITALS — BODY MASS INDEX: 26.26 KG/M2 | WEIGHT: 148.2 LBS | HEIGHT: 63 IN

## 2021-06-02 ENCOUNTER — RECORDS - HEALTHEAST (OUTPATIENT)
Dept: ADMINISTRATIVE | Facility: CLINIC | Age: 66
End: 2021-06-02

## 2021-06-03 VITALS — HEIGHT: 63 IN | WEIGHT: 142.2 LBS | BODY MASS INDEX: 25.2 KG/M2

## 2021-06-03 VITALS — WEIGHT: 149.9 LBS | HEIGHT: 63 IN | BODY MASS INDEX: 26.56 KG/M2

## 2021-06-03 VITALS — WEIGHT: 148 LBS | BODY MASS INDEX: 26.64 KG/M2

## 2021-06-03 NOTE — TELEPHONE ENCOUNTER
RN triage   Call from pt   Pt states she has hx of bronchitis and hx of pneumonia -- and feels like that   Pt states she has hx of COPD   Cough -- no fever - lungs hurt w/ deep breathing -- sharp -- denies chest pain   Pt states she is wheezing   Pt she uses albuterol inhaler a couple of times/ week   Reviewed home care advice   Per protocol = should be seen   Transferred to    Karma Parra RN [facility] RN triage      Reason for Disposition    Wheezing is present    Protocols used: COUGH-A-OH

## 2021-06-04 VITALS
HEART RATE: 80 BPM | OXYGEN SATURATION: 94 % | HEIGHT: 63 IN | DIASTOLIC BLOOD PRESSURE: 74 MMHG | TEMPERATURE: 98.9 F | BODY MASS INDEX: 25.87 KG/M2 | WEIGHT: 146 LBS | SYSTOLIC BLOOD PRESSURE: 132 MMHG

## 2021-06-04 VITALS
SYSTOLIC BLOOD PRESSURE: 135 MMHG | BODY MASS INDEX: 25.01 KG/M2 | DIASTOLIC BLOOD PRESSURE: 75 MMHG | OXYGEN SATURATION: 99 % | TEMPERATURE: 98.4 F | WEIGHT: 141.2 LBS | HEART RATE: 61 BPM

## 2021-06-04 NOTE — PROGRESS NOTES
Chief Complaint   Patient presents with     Shortness of Breath     Was seen last week for symptoms. Did not get anything prescribed. Feels that symptoms have gotten worse.      Flank Pain     Has pain on right side.      Nasal Congestion     Chills       HPI: Patient presents today with complaints of nasal congestion, chills, cough, and increased shortness of breath.  She was evaluated for similar symptoms on 12/4/2019 and it was thought that most likely this was a viral process.  Unfortunately the patient's symptoms have only continue to worsen.  The cough is intermittently dry and productive.  While she denies chest pain, she does have a little bit of achiness with deep coughing jags along her right flank which was noted at the last office visit.  She feels like the pain is exacerbated when she pushes on the area along the lower ribs.  No hemoptysis.  No known sick contacts.  Vital signs stable today.  She does have a known history of COPD and continues to smoke about 1/2 pack/day.    ROS:Review of Systems - negative except for what's listed in the HPI    SH: The Patient's  reports that she has been smoking cigarettes. She has been smoking about 0.50 packs per day. She has never used smokeless tobacco. She reports that she does not drink alcohol or use drugs.      FH: The Patient's family history includes Alcohol abuse in her father and mother; Cirrhosis in her mother; Coronary artery disease in her father and sister; Hypertension in her brother; Seizures in her sister.     Meds:    Current Outpatient Medications on File Prior to Visit   Medication Sig Dispense Refill     acetaminophen (TYLENOL) 325 MG tablet Take 650 mg by mouth every 6 (six) hours as needed for pain.       albuterol (VENTOLIN HFA) 90 mcg/actuation inhaler Inhale 2 puffs every 6 (six) hours as needed for wheezing. 18 g 11     No current facility-administered medications on file prior to visit.        O:  /74   Pulse 80   Temp 98.9  F  "(37.2  C) (Oral)   Ht 5' 3\" (1.6 m)   Wt 146 lb (66.2 kg)   SpO2 94%   BMI 25.86 kg/m      Physical Examination:   General appearance - alert, well appearing, and in no distress  Mental status - alert, oriented to person, place, and time  Eyes -PERRLA, conjunctiva pink  Ears - bilateral TM's and external ear canals normal  Nose - normal and patent, no erythema, discharge or polyps  Mouth - mucous membranes moist, pharynx normal  Neck - no significant adenopathy  Lymphatics - no palpable lymphadenopathy, no hepatosplenomegaly  Chest -inspiratory wheezes heard throughout.  No crackles.  Diminished breath sounds amongst the lower lobes.  Heart - normal rate and regular rhythm, S1 and S2 normal, no murmurs noted  Musculoskeletal-increased discomfort with deep palpation along the right lateral ribs.  Extremities - peripheral pulses normal, no pedal edema, no cyanosis  Skin - normal coloration and turgor.      A/P:     Problem List Items Addressed This Visit        ENT/CARD/PULM/ENDO Problems    COPD (chronic obstructive pulmonary disease) (H) (Chronic)      Other Visit Diagnoses     Cough    -  Primary    Relevant Medications    predniSONE (DELTASONE) 20 MG tablet    azithromycin (ZITHROMAX Z-BRANDON) 250 MG tablet    Other Relevant Orders    XR Chest 2 Views (Completed)    Tobacco abuse            Given worsening symptoms and history of COPD, x-ray ordered to rule out pneumonia.  This is negative but does show hyperinflation consistent with COPD.  Given this and wheezing, start with steroids.  Prednisone sent to the pharmacy to take daily.  Follow-up with azithromycin concurrently.  Continue with as needed albuterol.  Discussed with the patient that should symptoms fail to improve or she gets recurrent respiratory illnesses like this, she will want to start a maintenance inhaler.  Strongly encouraged tobacco cessation and the patient is making plans to address this in 2020.    1. Cough  - XR Chest 2 Views  - predniSONE " (DELTASONE) 20 MG tablet; Take 20 mg by mouth daily.  Dispense: 7 tablet; Refill: 0  - azithromycin (ZITHROMAX Z-BRANDON) 250 MG tablet; Take 2 tablets (500 mg) on  Day 1,  followed by 1 tablet (250 mg) once daily on Days 2 through 5.  Dispense: 6 tablet; Refill: 0    2. Chronic obstructive pulmonary disease, unspecified COPD type (H)    3. Tobacco abuse        Eric Oliveira, CNP

## 2021-06-04 NOTE — TELEPHONE ENCOUNTER
"Breathing hurts and cold is worse since seen by pcp on 12/4/19.    Using inhalers a couple times per day.    Currently coughing all day and night.  Phlegm is \"dark color\".    Fever yesterday and last night.  Did not take temp but it comes and goes.  Ears are plugged, not pain from blowing nose.    Drinking lots of fluids water, juice, etc.    Patient is calling to see if pcp can call in an antibiotic for her. She declined offer to be reseen.    Pharmacy confirmed.    Ok to leave detailed message    Susan Reyez RN Triage and refills    "

## 2021-06-04 NOTE — PATIENT INSTRUCTIONS - HE
Lungs show no evidence of pneumonia!    Because of your COPD history, it important that we get in front of this.  I did send a prescription of prednisone to the pharmacy along with the antibiotic azithromycin.    If you are getting worse over the weekend, get checked out again.    Quitting smoking is the single most important thing you can do for your long-term health!    I usually comment on labs and imaging after they are all resulted within 2 business days. If you haven't heard your results within a week, please contact the office.    Thank you for coming in today!    If you receive a survey from [employer] about your experience today, it would be very helpful if you could fill it out to let us know what went well and what we can improve!    General Information:    Today you had your appointment with Eric Oliveira NP    My hours are:    Monday : Out of clinic  Tuesday : 8:00AM - 5:00 PM  Wednesday: 8:00AM - 5:00 PM  Thursday: 8:00AM - 5:00 PM  Friday: 8:00AM - 5:00 PM    I am not in the office Mondays. Therefore non-urgent calls and medical messages received on Monday will be addressed when I am back in the office on Tuesday. Urgent matters will be reviewed and addressed by one of my partners in the office as needed.    If lab work was done today as part of your evaluation you will generally be contacted via [email or web address], mail, or phone with the results within 1-5 days. If there is an alarming result we will contact you by phone. Lab results come back at varying times, I generally wait until all lab results are available before making comments on the results.     If you need refills please contact your pharmacy. They will send a refill request to me to review. Please allow 3-5 business days for us to process all refill requests.     My Clinical Assistant is Patty. Please call us at [phone] or send a medical message with any questions or concerns.

## 2021-06-04 NOTE — TELEPHONE ENCOUNTER
Reason for Disposition    Sinus congestion (pressure, fullness) present > 10 days    Protocols used: COMMON COLD-A-OH

## 2021-06-04 NOTE — PROGRESS NOTES
ASSESSMENT/PLAN:       1. Need for vaccination  Influenza vaccine given    2. Intercostal muscle strain, initial encounter  Symptomatic treatment with relative rest  Okay to use some moist heat  Acetaminophen for pain but not to exceed 3000 mg daily  I do not feel that x-rays are needed today    3. Viral upper respiratory tract infection  This likely is viral in etiology and encourage good amounts of fluids  Long hot showers  Increased humidification in the house  And use of her albuterol inhaler every 6 hours as needed.  If the patient continues to have recurrent episodes of respiratory symptoms that flare should proceed with spirometry at a minimum or complete pulmonary function tests as the patient may benefit from a daily medication such as a anticholinergic inhaler.        Martín Marie MD      PROGRESS NOTE   12/4/2019    SUBJECTIVE:  Nettie Paul is a 64 y.o. female  who presents for   Chief Complaint   Patient presents with     Cough     having some wheezing, hurting on side and ribs when breathing     1. Need for vaccination    The patient is agreeable to the influenza vaccine.  The patient was given the PCV 13 vaccine 2 years ago     2. Intercostal muscle strain, initial encounter  For the last day the patient has noticed pain in the chest wall.  It hurts to move her upper body take a deep breath or cough.  The symptoms may have been related to shoveling snow recently.  She does not have shortness of breath but does have a cough and some wheezing.  Note that in July of this year the patient had some right-sided chest wall pain and had x-rays of her ribs and chest which were revealing of some old right-sided rib fractures but no acute fractures.    3. Viral upper respiratory tract infection  Cough is nonproductive and has a albuterol inhaler but has not used it much for symptoms.  Lots of exposure to others in her family that have been ill with similar symptoms.  Patient Active Problem List    Diagnosis     Chronic Hepatitis, C Virus     Hyperlipidemia     Hypothyroidism     Vitamin D deficiency     Tobacco dependence     COPD (chronic obstructive pulmonary disease) (H)     Major depression, recurrent (H)     Anxiety     Insomnia     Alcohol abuse     Opioid abuse (H)     Benzodiazepine abuse (H)     Migraine     Osteopenia of multiple sites     Chronic right shoulder pain       Current Outpatient Medications   Medication Sig Dispense Refill     acetaminophen (TYLENOL) 325 MG tablet Take 650 mg by mouth every 6 (six) hours as needed for pain.       albuterol (VENTOLIN HFA) 90 mcg/actuation inhaler Inhale 2 puffs every 6 (six) hours as needed for wheezing. 18 g 11     No current facility-administered medications for this visit.        Social History     Tobacco Use   Smoking Status Current Some Day Smoker     Packs/day: 0.50     Types: Cigarettes     Last attempt to quit: 7/23/2016     Years since quitting: 3.3   Smokeless Tobacco Never Used   Tobacco Comment    using patches in between; given smokiing cessation information           OBJECTIVE:        No results found for this or any previous visit (from the past 240 hour(s)).    Vitals:    12/04/19 1011   BP: 135/75   Pulse: 61   Temp: 98.4  F (36.9  C)   SpO2: 99%   Weight: 141 lb 3.2 oz (64 kg)     Weight: 141 lb 3.2 oz (64 kg)          Physical Exam:  GENERAL APPEARANCE: 64-year-old female, NAD, well hydrated, well nourished  SKIN:  Normal skin turgor, no lesions/rashes with particular attention to the right chest wall  HEENT: moist mucous membranes, no rhinorrhea, tympanic membranes are normal and oropharynx is normal  NECK: Normal without adenopathy or masses  CV: RRR, no M/G/R   LUNGS: Symmetrically decreased breath sounds without rales rhonchi or wheezes.  She does have tenderness very localized at about the sixth rib at the posterior axillary line right side without crepitus swelling or bruising in that area.  ABDOMEN: S&NT, no masses or  enlarged organs   EXTREMITY: no edema and full ROM of all joints  NEURO: no focal findings

## 2021-06-04 NOTE — TELEPHONE ENCOUNTER
It sounds like the patient is worse than when I saw her a week ago and if she has fever she needs to be seen in the clinic today.  Please help her get an appointment to be seen today.  Thank you,  Dr. Marie

## 2021-06-05 NOTE — PROGRESS NOTES
Please call the patient and inform her that her TSH was normal.  This means there is no thyroid dysfunction.  Good news and no other intervention needed at this time.  Thank you,  Dr. Marie

## 2021-06-05 NOTE — PROGRESS NOTES
ASSESSMENT/PLAN:       1. Gastroesophageal reflux disease without esophagitis     - omeprazole (PRILOSEC) 20 MG capsule; Take 1 capsule (20 mg total) by mouth daily before breakfast.  Dispense: 30 capsule; Refill: 5  Try to eat small more frequent meals  Avoid eating for 2 hours before going to sleep  Limit caffeine    2. Abnormal finding on thyroid function test     - Thyroid Stimulating Hormone (TSH)  Call patient with test results    3. Acute non-recurrent sinusitis, unspecified location     - doxycycline (MONODOX) 100 MG capsule; Take 1 capsule (100 mg total) by mouth 2 (two) times a day for 7 days.  Dispense: 14 capsule; Refill: 0  Drink good amounts of water  Long hot steam  Keep house well humidified  Continued efforts to stop smoking discussed    4. Chronic obstructive pulmonary disease, unspecified COPD type (H)  The patient does not use her albuterol inhaler very frequently and I do feel that in the near future it would be a good idea to do pulmonary spirometry to get a baseline on her lung function.    5. Chronic hepatitis C without hepatic coma (H)  The test results from her recent blood work were shared with her and she is very pleased that the treatment seems to have been successful at eradicating hepatitis C and her blood.  She apparently also has some studies done such as an ultrasound and testing for fibrosis which were favorable.  The hepatic steatosis has also improved.        I have counseled the patient for tobacco cessation and the follow up will occur  at the next visit.    Martín Marie MD      PROGRESS NOTE   1/11/2020    SUBJECTIVE:  Nettie Paul is a 64 y.o. female  who presents for   Chief Complaint   Patient presents with     Follow-up     medication check, talk about thyroid and check it     1. Gastroesophageal reflux disease without esophagitis  The last month the patient had significant symptoms of acid reflux with some chest pain that she describes as burning as well as a  funny taste in her mouth and also even just a decrease in her taste and smell.  She does not have any dysphasia and she has been taking over-the-counter antacid such as Tums with some regularity.  Symptom seem to be worse at night and are not related to physical activity.    2. Abnormal finding on thyroid function test  The patient is wanting her thyroid to be checked because years ago when she was treated for hepatitis C it caused abnormal thyroid function and she needed to be on thyroid replacement for short time.    3. Acute non-recurrent sinusitis, unspecified location  The patient has had 3 months of nasal and sinus congestion with postnasal drainage and some sinus pain.  Her symptoms have been worse over the last 2 weeks.  About a year long symptoms with cough and was treated with prednisone as well as azithromycin which helped the cough and respiratory symptoms in her chest but not the sinus symptoms.    4. Chronic obstructive pulmonary disease, unspecified COPD type (H)  The patient currently uses albuterol inhaler on a as needed basis for her COPD.  At some point in the future she should have spirometry done.  She has been successful at significantly decreasing her tobacco usage and hopes to quit in the very near future.  Presently less than a fourth of a pack of cigarettes a day.    5. Chronic hepatitis C without hepatic coma (H)  The patient has completed her treatment with [facility] for her chronic hepatitis C and [facility] send copies of her labs which showed normal hepatic function and no evidence for hepatitis C in her blood.    Patient Active Problem List   Diagnosis     Chronic Hepatitis, C Virus     Hyperlipidemia     Hypothyroidism     Vitamin D deficiency     Tobacco dependence     COPD (chronic obstructive pulmonary disease) (H)     Major depression, recurrent (H)     Anxiety     Insomnia     Alcohol abuse     Opioid abuse (H)     Benzodiazepine abuse (H)     Migraine     Osteopenia of multiple  sites     Chronic right shoulder pain       Current Outpatient Medications   Medication Sig Dispense Refill     acetaminophen (TYLENOL) 325 MG tablet Take 650 mg by mouth every 6 (six) hours as needed for pain.       albuterol (VENTOLIN HFA) 90 mcg/actuation inhaler Inhale 2 puffs every 6 (six) hours as needed for wheezing. 18 g 11     doxycycline (MONODOX) 100 MG capsule Take 1 capsule (100 mg total) by mouth 2 (two) times a day for 7 days. 14 capsule 0     omeprazole (PRILOSEC) 20 MG capsule Take 1 capsule (20 mg total) by mouth daily before breakfast. 30 capsule 5     No current facility-administered medications for this visit.        Social History     Tobacco Use   Smoking Status Current Some Day Smoker     Packs/day: 0.50     Types: Cigarettes   Smokeless Tobacco Never Used   Tobacco Comment    using patches in between; given smokiing cessation information           OBJECTIVE:        Recent Results (from the past 240 hour(s))   Thyroid Stimulating Hormone (TSH)   Result Value Ref Range    TSH 2.25 0.30 - 5.00 uIU/mL       Vitals:    01/10/20 1305   BP: 130/78   Pulse: 78   SpO2: 96%   Weight: 144 lb 11.2 oz (65.6 kg)     Weight: 144 lb 11.2 oz (65.6 kg)          Physical Exam:  GENERAL APPEARANCE: Very pleasant 64-year-old female, NAD, well hydrated, well nourished  SKIN:  Normal skin turgor, no lesions/rashes   HEENT: moist mucous membranes, colored rhinorrhea, tympanic membranes and ear canals are normal and oropharynx reveals some postnasal drainage.  The patient does have some sinus percussion tenderness in the maxillary sinuses left side more  NECK: Normal without adenopathy or masses  CV: RRR, no M/G/R   LUNGS: Symmetrically decreased breath sounds without rales or wheezes does have some rhonchi  ABDOMEN: S&NT, no masses or enlarged organs   EXTREMITY: no edema and full ROM of all joints  NEURO: no focal findings

## 2021-06-08 NOTE — TELEPHONE ENCOUNTER
Refill Approved    Rx renewed per Medication Renewal Policy. Medication was last renewed on 5/1/19.    Mari Alcazar, [facility] Connection Triage/Med Refill 6/12/2020     Requested Prescriptions   Pending Prescriptions Disp Refills     albuterol (PROAIR HFA;PROVENTIL HFA;VENTOLIN HFA) 90 mcg/actuation inhaler [Pharmacy Med Name: ALBUTEROL HFA INH (200 PUFFS) 18GM] 18 g 11     Sig: INHALE 2 PUFFS BY MOUTH EVERY 6 HOURS AS NEEDED FOR WHEEZE       Albuterol/Levalbuterol Refill Protocol Passed - 6/10/2020  1:04 PM        Passed - PCP or prescribing provider visit in last year     Last office visit with prescriber/PCP: 1/10/2020 Martín Marie MD OR same dept: 1/10/2020 Martín Marie MD OR same specialty: 1/10/2020 Martín Marie MD Last physical: Visit date not found       Next appt within 3 mo: Visit date not found  Next physical within 3 mo: Visit date not found  Prescriber OR PCP: Martín Marie MD  Last diagnosis associated with med order: 1. COPD (chronic obstructive pulmonary disease) (H)  - albuterol (PROAIR HFA;PROVENTIL HFA;VENTOLIN HFA) 90 mcg/actuation inhaler [Pharmacy Med Name: ALBUTEROL HFA INH (200 PUFFS) 18GM]; INHALE 2 PUFFS BY MOUTH EVERY 6 HOURS AS NEEDED FOR WHEEZE  Dispense: 18 g; Refill: 11    If protocol passes may refill for 6 months if within 3 months of last provider visit (or a total of 9 months). If patient requesting >1 inhaler per month refill x 6 months and have patient make appointment with provider.

## 2021-06-08 NOTE — TELEPHONE ENCOUNTER
Refill Approved    Rx renewed per Medication Renewal Policy. Medication was last renewed on 5/12/20, last OV 1/10/20.    Shayna Pereira, [facility] Connection Triage/Med Refill 6/14/2020     Requested Prescriptions   Pending Prescriptions Disp Refills     albuterol (PROAIR HFA;PROVENTIL HFA;VENTOLIN HFA) 90 mcg/actuation inhaler [Pharmacy Med Name: ALBUTEROL HFA INH (200 PUFFS) 18GM] 54 g 0     Sig: INHALE 2 PUFFS BY MOUTH EVERY 6 HOURS AS NEEDED FOR WHEEZING       Albuterol/Levalbuterol Refill Protocol Passed - 6/12/2020  7:06 AM        Passed - PCP or prescribing provider visit in last year     Last office visit with prescriber/PCP: 1/10/2020 Martín Marie MD OR same dept: 1/10/2020 Martín Marie MD OR same specialty: 1/10/2020 Martín Marie MD Last physical: Visit date not found       Next appt within 3 mo: Visit date not found  Next physical within 3 mo: Visit date not found  Prescriber OR PCP: Martín Marie MD  Last diagnosis associated with med order: 1. COPD (chronic obstructive pulmonary disease) (H)  - albuterol (PROAIR HFA;PROVENTIL HFA;VENTOLIN HFA) 90 mcg/actuation inhaler [Pharmacy Med Name: ALBUTEROL HFA INH (200 PUFFS) 18GM]; INHALE 2 PUFFS BY MOUTH EVERY 6 HOURS AS NEEDED FOR WHEEZING  Dispense: 54 g; Refill: 0    If protocol passes may refill for 6 months if within 3 months of last provider visit (or a total of 9 months). If patient requesting >1 inhaler per month refill x 6 months and have patient make appointment with provider.

## 2021-06-09 NOTE — PROGRESS NOTES
ASSESSMENT/PLAN:       1. COPD exacerbation (H)     - predniSONE (DELTASONE) 20 MG tablet; Take 20 mg by mouth 2 (two) times a day for 5 days.  Dispense: 10 tablet; Refill: 0    - albuterol (PROAIR HFA;PROVENTIL HFA;VENTOLIN HFA) 90 mcg/actuation inhaler; INHALE 2 PUFFS BY MOUTH EVERY 6 HOURS AS NEEDED FOR WHEEZING  Dispense: 54 g; Refill: 3  Patient states that she has used prednisone in the past with no adverse effects.  I encouraged her to take the prednisone with food.  We will her albuterol inhaler.  Still feel that it would be a good idea to get at least spirometry or complete PFTs in the near future.    2. Aspirin long-term use     - aspirin 81 MG EC tablet; Take 1 tablet (81 mg total) by mouth daily.  Dispense: 150 tablet; Refill: 2  I feel that with her smoking history is a good idea for her to take a aspirin 81 mg daily and best if taken with food.  If she is noticing that her stomach is upset she should stop the aspirin.    3. Tobacco dependence  Strongly encouraged the patient to completely quit smoking and encouraged her with the success that she has had.    4. Seasonal allergic rhinitis, unspecified trigger  I suggested that she use Claritin without the decongestant which should not cause the shakiness.    Be good to see the patient back in about 3 months and if doing better at that time and more at her baseline would get pulmonary function test done and again if still smoking approach that with other possibilities besides nicotine replacement.    If she is developing worsening shortness of breath with or without fever she should be seen urgently.  She has been trying to take measures to keep her self safe and isolated from crowds and close spacing to individuals that are not in her germ pool.     I have counseled the patient for tobacco cessation and the follow up will occur  at the next visit.    Martín Marie MD      PROGRESS NOTE   7/7/2020    SUBJECTIVE:  Nettie Paul is a 65 y.o. female   who presents for   Chief Complaint   Patient presents with     Follow-up     6 month follow up, woke up this morning felt like it was hard to breathe, inhaler helped      The patient is seen today for a six-month follow-up on her COPD.  However in addition last night about 4:00 in the morning the patient woke up with shortness of breath and a feeling as if it was hard to get her air.  She felt tight in her chest and she used her albuterol inhaler and took a low-dose aspirin.  Within an hour or less her symptoms had subsided.  She did not have any pain that radiated to the neck or down the arm.  She did not notice any palpitations of her heart.    1. COPD exacerbation (H)  Patient states that her allergies have been bothering her quite a bit and she typically is using her albuterol inhaler about twice a day usually first thing in the morning and before bedtime.  The patient has a cough and some wheezing noted with no production of phlegm.    2. Aspirin long-term use  The patient in general has been taking a low-dose aspirin 81 mg daily and is wondering if that is a good idea.    3. Tobacco dependence  The patient has cut back on her smoking but still is smoking about 5 cigarettes a day.  When she has used the nicotine patches that seems to cause her to be hyper active and jittery.  She certainly endorses the need for her to quit smoking completely.    4. Seasonal allergic rhinitis, unspecified trigger  The patient has not had allergy testing done but typically in the summer months has congested nose sneezing itchy watery eyes however when she takes Claritin-D she feels shaky  Patient Active Problem List   Diagnosis     Chronic Hepatitis, C Virus     Hyperlipidemia     Hypothyroidism     Vitamin D deficiency     Tobacco dependence     COPD (chronic obstructive pulmonary disease) (H)     Major depression, recurrent (H)     Anxiety     Insomnia     Alcohol abuse     Opioid abuse (H)     Benzodiazepine abuse (H)      Migraine     Osteopenia of multiple sites     Chronic right shoulder pain       Current Outpatient Medications   Medication Sig Dispense Refill     acetaminophen (TYLENOL) 325 MG tablet Take 650 mg by mouth every 6 (six) hours as needed for pain.       albuterol (PROAIR HFA;PROVENTIL HFA;VENTOLIN HFA) 90 mcg/actuation inhaler INHALE 2 PUFFS BY MOUTH EVERY 6 HOURS AS NEEDED FOR WHEEZING 54 g 3     omeprazole (PRILOSEC) 20 MG capsule Take 1 capsule (20 mg total) by mouth daily before breakfast. 30 capsule 5     aspirin 81 MG EC tablet Take 1 tablet (81 mg total) by mouth daily. 150 tablet 2     predniSONE (DELTASONE) 20 MG tablet Take 20 mg by mouth 2 (two) times a day for 5 days. 10 tablet 0     No current facility-administered medications for this visit.        Social History     Tobacco Use   Smoking Status Current Some Day Smoker     Packs/day: 0.50     Types: Cigarettes   Smokeless Tobacco Never Used   Tobacco Comment    using patches in between; given smokiing cessation information           OBJECTIVE:        No results found for this or any previous visit (from the past 240 hour(s)).    Vitals:    07/07/20 1639   BP: 138/68   Pulse: 89   SpO2: 99%   Weight: 146 lb 12.8 oz (66.6 kg)     Weight: 146 lb 12.8 oz (66.6 kg)          Physical Exam:  GENERAL APPEARANCE: 65-year-old female with oxygen saturation of 99%, NAD, well hydrated, well nourished  SKIN:  Normal skin turgor, no lesions/rashes, patient skin is tanned  HEENT: moist mucous membranes, no rhinorrhea, conjunctiva is clear  NECK: Normal without adenopathy or masses  CV: RRR, no M/G/R   LUNGS: Symmetrically decreased breath sounds with end inspiratory and expiratory wheezing as well as some rhonchi that do not clear with coughing.  ABDOMEN: S&NT, no masses or enlarged organs   EXTREMITY: no edema and full ROM of all joints  NEURO: no focal findings

## 2021-06-10 ENCOUNTER — OFFICE VISIT - HEALTHEAST (OUTPATIENT)
Dept: FAMILY MEDICINE | Facility: CLINIC | Age: 66
End: 2021-06-10

## 2021-06-10 DIAGNOSIS — J44.9 COPD (CHRONIC OBSTRUCTIVE PULMONARY DISEASE) (H): ICD-10-CM

## 2021-06-10 DIAGNOSIS — M47.812 CERVICAL SPONDYLOSIS WITHOUT MYELOPATHY: ICD-10-CM

## 2021-06-10 DIAGNOSIS — M43.12 SPONDYLOLISTHESIS OF CERVICAL REGION: ICD-10-CM

## 2021-06-10 DIAGNOSIS — Z86.19 HX OF HEPATITIS C: ICD-10-CM

## 2021-06-10 DIAGNOSIS — M47.812 FACET ARTHROPATHY, CERVICAL: ICD-10-CM

## 2021-06-10 DIAGNOSIS — I10 BENIGN ESSENTIAL HYPERTENSION: ICD-10-CM

## 2021-06-10 ASSESSMENT — MIFFLIN-ST. JEOR: SCORE: 1157

## 2021-06-10 NOTE — TELEPHONE ENCOUNTER
Pt requesting albuterol neb solution. Her COPD is acting up with the allergies in the air.      Order pending.

## 2021-06-10 NOTE — PROGRESS NOTES
"Nettie Paul is a 65 y.o. female who is being evaluated via a billable telephone visit.      The patient has been notified of following:     \"This telephone visit will be conducted via a call between you and your physician/provider. We have found that certain health care needs can be provided without the need for a physical exam.  This service lets us provide the care you need with a short phone conversation.  If a prescription is necessary we can send it directly to your pharmacy.  If lab work is needed we can place an order for that and you can then stop by our lab to have the test done at a later time.    Telephone visits are billed at different rates depending on your insurance coverage. During this emergency period, for some insurers they may be billed the same as an in-person visit.  Please reach out to your insurance provider with any questions.    If during the course of the call the physician/provider feels a telephone visit is not appropriate, you will not be charged for this service.\"    Patient has given verbal consent to a Telephone visit? Yes    What phone number would you like to be contacted at?[phone]    Patient would like to receive their AVS by AVS Preference: Mail a copy.    Additional provider notes:   ASSESSMENT/PLAN:       1. COPD exacerbation (H)    - predniSONE (DELTASONE) 20 MG tablet; 2 tablets daily with food for 5 days.  Dispense: 10 tablet; Refill: 0  - albuterol (PROVENTIL) 2.5 mg /3 mL (0.083 %) nebulizer solution; Take 3 mL (2.5 mg total) by nebulization every 4 (four) hours as needed for wheezing.  Dispense: 25 vial; Refill: 0  I sent a prescription for prednisone to have on hand that will be delivered through the mail out service with [facility]  Albuterol neb solution sent to the local [facility] in [place] so she has that on hand  Give me a follow-up on how she is doing in the next week    2. Tobacco dependence  Encouraged the patient on her smoking cessation which " hopefully can be sustained    3. Seasonal allergic rhinitis, unspecified trigger  Patient should continue to use nonsedating antihistamine such as loratadine 10 mg daily  Could consider Singulair as an add-on    Should consider pulmonary function test in the future  Patient would be a good candidate for anticholinergic inhaler such as Spiriva or Incruse          Martín Marie MD      PROGRESS NOTE   8/19/2020    SUBJECTIVE:  Nettie Paul is a 65 y.o. female  who presents for   Chief Complaint   Patient presents with     Medication Refill     The patient is having a problem with an exacerbation of her COPD over the last 3 or 4 days.  This often happens this time of the year with her allergies.  She does use Claritin for her allergies.  She has had no fever but has had a dry cough with shortness of breath and tightness in her chest.  The albuterol helps with the use of the inhaler and also with a nebulizer that will seem to help even quicker.  She did have a COPD exacerbation in early July 2020 and required prednisone 40 mg for 5 days.  She seemed to get over that with no difficulty.  Note that in December 2019 chest x-ray showed hyperinflation  Patient has had successful treatment for chronic hepatitis C with clearing of the hepatitis C virus noted in early January 2020.  Nicotine dependency with no smoking over the last for 5 days  PHQ 9  1  Dominick 7  1    Patient Active Problem List   Diagnosis     Chronic Hepatitis, C Virus     Hyperlipidemia     Hypothyroidism     Vitamin D deficiency     Tobacco dependence     COPD (chronic obstructive pulmonary disease) (H)     Major depression, recurrent (H)     Anxiety     Insomnia     Alcohol abuse     Opioid abuse (H)     Benzodiazepine abuse (H)     Migraine     Osteopenia of multiple sites     Chronic right shoulder pain       Current Outpatient Medications   Medication Sig Dispense Refill     acetaminophen (TYLENOL) 325 MG tablet Take 650 mg by mouth every 6 (six)  hours as needed for pain.       albuterol (PROAIR HFA;PROVENTIL HFA;VENTOLIN HFA) 90 mcg/actuation inhaler INHALE 2 PUFFS BY MOUTH EVERY 6 HOURS AS NEEDED FOR WHEEZING 54 g 3     albuterol (PROVENTIL) 2.5 mg /3 mL (0.083 %) nebulizer solution Take 3 mL (2.5 mg total) by nebulization every 4 (four) hours as needed for wheezing. 25 vial 0     aspirin 81 MG EC tablet Take 1 tablet (81 mg total) by mouth daily. 150 tablet 2     omeprazole (PRILOSEC) 20 MG capsule Take 1 capsule (20 mg total) by mouth daily before breakfast. 30 capsule 5     predniSONE (DELTASONE) 20 MG tablet 2 tablets daily with food for 5 days. 10 tablet 0     No current facility-administered medications for this visit.        Social History     Tobacco Use   Smoking Status Current Some Day Smoker     Packs/day: 0.50     Types: Cigarettes   Smokeless Tobacco Never Used   Tobacco Comment    using patches in between; given smokiing cessation information           OBJECTIVE:        No results found for this or any previous visit (from the past 240 hour(s)).    There were no vitals filed for this visit.  No data recorded          Phone call duration:  8 minutes    Martín Marie MD

## 2021-06-10 NOTE — TELEPHONE ENCOUNTER
Who is calling: Nettie  Reason for Call:  Meds  Date of last appointment with primary care: 7/7/2020  Okay to leave a detailed message: Yes

## 2021-06-11 NOTE — PROGRESS NOTES
SUBJECTIVE: Nettie Mata is a 62 y.o.  female who presents today complaining of pain and anxiety.  She had been at a pain clinic that was recently shut down.  She was on chronic relatively high dose narcotics and benzodiazepines through this clinic.  She had been hospitalized with overdose twice.  At one point she had stopped all controlled substances and told me she was not going to go back on them.  However, she was having too much pain and now is [facility] to [facility] and she tells me she has a future appointment next Friday.  She complains of neck pain that goes down into her left arm and chest and complains that her right arm was numb first.  She tells me when the pain clinic closed, a Dr. Armando who specializes in PM and R gave her 3 months supply of her meds which have since run out.  I tell her that I do not feel comfortable with giving her any narcotics or benzodiazepines at this time.  She is experiencing insomnia and we discussed trying to use something non-benzodiazepine such as mirtazapine.  She seems willing.  She does have migraine headache and I am willing to fill that medication.  She has hyperlipidemia and a history of hep C and IV drug abuse.  We had tried to get her some treatment to cure her hep C, but had a hard time getting that covered by insurance.  I suggested perhaps when she is ready, that we could send her to infectious disease.  She will consider this.  She has a history of hypothyroidism, hyperlipidemia, and vitamin D deficiency and needs these labs read checked.  She continues to smoke a very small amount despite using a patch and she knows she is got to change this.  She tells me she was in the urgency room after getting a cold and having it cause atypical chest pain.  They did a stress test which was normal.    OBJECTIVE: /70  Pulse 60  Wt 147 lb 14.4 oz (67.1 kg)  BMI 27.05 kg/m2  General: Relatively healthy-appearing overweight older  female in no acute distress  Heart: Regular rate and rhythm without murmur  Lungs: Clear bilaterally  Abdomen: Soft, nontender  Extremities: Warm, dry and without edema    PHQ 9 score is 4, KEITH 7 score is 3    I have counseled the patient for tobacco cessation and the follow up will occur  at the next visit.      ASSESSMENT & PLAN:    1. Hyperlipidemia  Lipid Gunter RANDOM    Comprehensive Metabolic Panel   2. Acquired hypothyroidism  Thyroid Stimulating Hormone (TSH)   3. Chronic hepatitis C virus infection     4. Anxiety     5. Recurrent major depressive disorder, in full remission     6. Insomnia  mirtazapine (REMERON) 30 MG tablet   7. Migraine  rizatriptan (MAXALT-MLT) 10 MG disintegrating tablet   8. Vitamin D deficiency  Vitamin D, Total (25-Hydroxy)     We will do a trial of mirtazapine to see if this is helpful at all for her insomnia.  I am refilling her migraine medication.  I am however refusing to give her any narcotic or benzodiazepine.  She seems okay with this.  I will do the above-mentioned lab work and get back to her by mail and only call with grossly abnormal values.  She usually sees me once or twice a year.    Patient Active Problem List   Diagnosis     Chronic Hepatitis, C Virus     Hyperlipidemia     Hypothyroidism     Sudden Redness Of The Skin (Flushing)     Vitamin D deficiency     Tobacco dependence     COPD (chronic obstructive pulmonary disease)     Major depression, recurrent     Anxiety     Insomnia     Alcohol abuse     Opioid abuse     Benzodiazepine abuse     Migraine       Current Outpatient Prescriptions on File Prior to Visit   Medication Sig Dispense Refill     albuterol (PROVENTIL HFA;VENTOLIN HFA) 90 mcg/actuation inhaler Inhale 2 puffs every 6 (six) hours as needed for wheezing. 8.5 g 2     omeprazole (PRILOSEC) 20 MG capsule Take 20 mg by mouth.       omeprazole (PRILOSEC) 40 MG capsule TK 1 C PO QD  11     OXYCONTIN 10 mg 12 hr tablet   0     predniSONE (DELTASONE) 10 MG  tablet TK  3 TS PO D X 4 DAYS THEN 2 D X 4 DAYS THEN 1 D X 4 DAYS THEN STOP  1     No current facility-administered medications on file prior to visit.

## 2021-06-12 NOTE — TELEPHONE ENCOUNTER
Patient states her  tested COVID positive five days ago and he was hospitalized  Patient took care of her  before going to hospital . Per patient her son ans grandson also   Tested positive for COVID and she is been around with them .  Patient states she have COPD requesting providers suggestion for further treatment options .  Symptom  Describe your symptoms: Weakness , breathing difficulties , headaches, fever on and off for couple of days , sharp pain when breathing.   Any pain: Yes   New/Ongoing: New  How long have you been having symptoms: 4  day(s)  Have you been seen for this:  No  Appointment offered?: Patient declines  Triage offered?: Yes, declined  Home remedies tried: None   Requested Pharmacy: [facility]  Okay to leave a detailed message? No

## 2021-06-12 NOTE — PROGRESS NOTES
ASSESSMENT/PLAN:       1. History of COPD     - PFT Complete; Future  I renewed the patient's albuterol solution for her neb.  The patient feels that she is doing better and this may be seasonal because we have had killing frost.  I feel that it is important to quantify and identify COPD, And if so the severity of it..      2. LAWSON (dyspnea on exertion)  The patient's dyspnea seems to be stable    - PFT Complete; Future    3. COPD exacerbation (H)  I talked to the patient about using a maintenance treatment such as a long-acting bronchodilator such as Spiriva or a long-acting beta agonist with a steroid and she says she is never done that and will see what the pulmonary function test show.    - albuterol (PROVENTIL) 2.5 mg /3 mL (0.083 %) nebulizer solution; Take 3 mL (2.5 mg total) by nebulization every 4 (four) hours as needed for wheezing.  Dispense: 25 vial; Refill: 7    4. Tobacco dependence  Unfortunately the patient is back smoking and she has not been able to tolerate sustained use of nicotine replacement because of heartburn.  Consider resting that smoking does not give her heartburn.    5. Seasonal allergic rhinitis, unspecified trigger  May 1 exert using activities that are nonsedating during her seasonal allergy.  Also could consider Singulair.    6. Encounter for screening mammogram for breast cancer     - Mammo Screening Bilateral; Future    7. Need for vaccination     - Pneumococcal polysaccharide vaccine 23-valent 1 yo or older, subq/IM  - Influenza, Recombinant, Inj, Quadrivalent, PF, 18+YRS    Phone call with test results  Return to clinic 6 months for a interval evaluation and I would be about the time when her allergy symptoms could be bothering her and try to intervene at that time.        I have counseled the patient for tobacco cessation and the follow up will occur  at the next visit.    Martín Marie MD      PROGRESS NOTE   10/28/2020    SUBJECTIVE:  Nettie Paul is a 65 y.o. female   who presents for   Chief Complaint   Patient presents with     Follow-up     COPD, feeling up and down good days and bad days     The patient is seen for a 3-month follow-up on her COPD.    1. History of COPD  The patient has a history of COPD this been documented but I do not see evidence of spirometry or pulmonary function tests in the past.  She does not recall having one done either.  The patient is only taking rescue medicine in the form of albuterol and she has an inhaler as well as nebs and is finding the need to use duo nebulization treatments less frequent.  The patient's dyspnea on exertion has improved some.  She is not had any additional acute exacerbation since August 2020.    2. LAWSON (dyspnea on exertion)  The patient tends to get exacerbations of her COPD at least twice a year with cough wheezing and sputum production.  Tends to respond to steroids.    3. COPD exacerbation (H)  Last exacerbation was in August 2020.    4. Tobacco dependence  In August when she was seen for an acute exacerbation she had not smoked for about 5 days but that was short-lived.  She now is smoking at least a half a pack of cigarettes a day.  Using nicotine replacement such as gum or the patches seem to cause heartburn.    5. Seasonal allergic rhinitis, unspecified trigger  The patient currently is not having much for her seasonal allergy symptoms.  Usually spring and fall tend to be worse.    6. Encounter for screening mammogram for breast cancer  The patient is agreeable to getting a mammogram as her sister was recently diagnosed with breast cancer    7. Need for vaccination  The patient is due for an influenza and Pneumovax today and she is agreeable to that    Patient Active Problem List   Diagnosis     Chronic Hepatitis, C Virus     Hyperlipidemia     Hypothyroidism     Vitamin D deficiency     Tobacco dependence     COPD (chronic obstructive pulmonary disease) (H)     Major depression, recurrent (H)     Anxiety      Insomnia     Alcohol abuse     Opioid abuse (H)     Benzodiazepine abuse (H)     Migraine     Osteopenia of multiple sites     Chronic right shoulder pain       Current Outpatient Medications   Medication Sig Dispense Refill     acetaminophen (TYLENOL) 325 MG tablet Take 650 mg by mouth every 6 (six) hours as needed for pain.       albuterol (PROAIR HFA;PROVENTIL HFA;VENTOLIN HFA) 90 mcg/actuation inhaler INHALE 2 PUFFS BY MOUTH EVERY 6 HOURS AS NEEDED FOR WHEEZING 54 g 3     albuterol (PROVENTIL) 2.5 mg /3 mL (0.083 %) nebulizer solution Take 3 mL (2.5 mg total) by nebulization every 4 (four) hours as needed for wheezing. 25 vial 7     aspirin 81 MG EC tablet Take 1 tablet (81 mg total) by mouth daily. 150 tablet 2     predniSONE (DELTASONE) 20 MG tablet 2 tablets daily with food for 5 days. 10 tablet 0     No current facility-administered medications for this visit.        Social History     Tobacco Use   Smoking Status Current Some Day Smoker     Packs/day: 0.50     Types: Cigarettes   Smokeless Tobacco Never Used   Tobacco Comment    using patches in between; given smokiing cessation information           OBJECTIVE:        No results found for this or any previous visit (from the past 240 hour(s)).    Vitals:    10/26/20 1310   BP: 138/78   Pulse: 83   SpO2: 97%   Weight: 141 lb 11.2 oz (64.3 kg)     Weight: 141 lb 11.2 oz (64.3 kg)          Physical Exam:  GENERAL APPEARANCE: 65-year-old female, NAD, well hydrated, well nourished  Note O2 saturation is 97% on room air  SKIN:  Normal skin turgor, no lesions/rashes   HEENT: moist mucous membranes, no rhinorrhea  NECK: Normal without adenopathy or masses  CV: RRR, no M/G/R   LUNGS: Symmetrically decreased breath sounds with some rhonchi and scattered expiratory wheezing  ABDOMEN: S&NT, no masses or enlarged organs   EXTREMITY: no edema and full ROM of all joints  NEURO: no focal findings

## 2021-06-12 NOTE — TELEPHONE ENCOUNTER
It is very likely that she has COVID. Testing would be recommended. I can order a COVID test if she wishes.  If her symptoms are getting worse with more trouble breathing she would have to go to the ED.  She can use her Albuterol inhaler for shortness of breath.    Dr. Marie

## 2021-06-13 NOTE — TELEPHONE ENCOUNTER
Coronavirus (COVID-19) Notification    Lab Result   Lab test 2019-nCoV rRt-PCR OR SARS-COV-2 PCR    Nasopharyngeal AND/OR Oropharyngeal swab is NEGATIVE for 2019-nCoV RNA [OR] SARS-COV-2 RNA (COVID-19) RNA      {Name]  Jesus Lopez RN  [place]  Emergency Dept Lab Result RN  # [phone]

## 2021-06-14 NOTE — PROGRESS NOTES
Assessment and Plan:       1. COPD (chronic obstructive pulmonary disease)   stable, patient continues to smoke a small amount daily and is pre-contemplative    - albuterol (PROAIR HFA;PROVENTIL HFA;VENTOLIN HFA) 90 mcg/actuation inhaler; Inhale 2 puffs every 6 (six) hours as needed for wheezing.  Dispense: 8.5 g; Refill: 0    2. Medicare annual wellness visit, subsequent    - Influenza, Seasonal,Quad Inj, 36+ MOS  - Pneumococcal conjugate vaccine 13-valent 6wks-17yrs; >50yrs  - mammogram and DEXA scan were ordered  -  Pap smear was done today  - Cologuard testing was ordered    3. Chronic hepatitis C virus infection   patient is considering curative treatment for this condition  - Comprehensive Metabolic Panel    4. Hyperlipidemia, unspecified hyperlipidemia type   not currently treating  - Lipid [place] FASTING    5. Acquired hypothyroidism   last check was good and was less than one year ago    6. Chest pain, unspecified type   patient does have chronic pain syndrome and does complain of recent injury to her left lower rib cage. However, she also complains of chest tightness with exertion.  - Chest x-ray was done today  - nuclear medicine stress test was ordered today      The patient's current medical problems were reviewed.    The following high BMI interventions were performed this visit: encouragement to exercise, weight monitoring and lifestyle education regarding diet  I have counseled the patient for tobacco cessation and the follow up will occur  at the next visit.  The following health maintenance schedule was reviewed with the patient and provided in printed form in the after visit summary:   Health Maintenance   Topic Date Due     DEPRESSION FOLLOW UP  1955     ADVANCE DIRECTIVES DISCUSSED WITH PATIENT  02/10/1975     MAMMOGRAM  01/16/2015     ZOSTER VACCINE  02/10/2017     COLONOSCOPY  02/15/2017     PAP SMEAR  08/22/2019     TD 18+ HE  05/04/2021     INFLUENZA VACCINE RULE BASED  Completed      TDAP ADULT ONE TIME DOSE  Completed        Subjective:   Chief Complaint: Nettie MART is an 62 y.o. female here for an Annual Wellness visit.   HPI:   Patient is a very complicated patient. She is currently on disability/Workmen's Comp. Secondary to an injury at work. She has chronic pain and has been opioid dependence in the past. She also has been benzodiazepine dependent in the past. She claims she is completely off all of these controlled substances. She has said this before to me and had fallen back into using them. She has chronic hepatitis C, depression, COPD, reflux, and migraine headache. She has not had a mammogram since 2013 and we discussed having this done as well as the DEXA scan. She needs a colonoscopy. Her last pap was in August 2014 and no HPV was done at that time. She wishes to do a Pap today. She recently got  to her long time significant other, Perfecto on July 22. Together they have six grandchildren and are very active with them. She complains of recently falling up the stairs carrying her laundry basket and hitting the left lower rib cage.    Review of Systems:    General ROS: positive for  - sleep disturbance  Psychological ROS: positive for - anxiety  Ophthalmic ROS: negative  ENT ROS: negative  Allergy and Immunology ROS: negative  Hematological and Lymphatic ROS: negative  Endocrine ROS: negative  Breast ROS: negative  Respiratory ROS: positive for - cough  Cardiovascular ROS: positive for - chest pain and dyspnea on exertion  Gastrointestinal ROS: positive for - heartburn and chronic hep C  Genito-Urinary ROS: negative  Musculoskeletal ROS: positive for - chronic pain-spine   Neurological ROS: positive for - migraine  Dermatological ROS: negative    Patient Care Team:  Missy Prado MD as PCP - General     Patient Active Problem List   Diagnosis     Chronic Hepatitis, C Virus     Hyperlipidemia     Hypothyroidism     Sudden Redness Of The Skin ([place])     Vitamin  D deficiency     Tobacco dependence     COPD (chronic obstructive pulmonary disease)     Major depression, recurrent     Anxiety     Insomnia     Alcohol abuse     Opioid abuse     Benzodiazepine abuse     Migraine     Past Medical History:   Diagnosis Date     Dyslexia, developmental      Hep C w/o coma, chronic     had failed treatment, saw [facility]      (normal spontaneous vaginal delivery)       Past Surgical History:   Procedure Laterality Date      SECTION, LOW TRANSVERSE       HIP ARTHROSCOPY       ORIF FEMUR FRACTURE       ROTATOR CUFF REPAIR Right       Family History   Problem Relation Age of Onset     Cirrhosis Mother      d. 64     Alcohol abuse Mother      Coronary artery disease Father      d. 52     Alcohol abuse Father      Coronary artery disease Sister      Seizures Sister      Hypertension Brother       Social History     Social History     Marital status: Single     Spouse name: N/A     Number of children: 3     Years of education: 10     Occupational History     disabled      Social History Main Topics     Smoking status: Current Some Day Smoker     Packs/day: 1.00     Types: Cigarettes     Last attempt to quit: 2016     Smokeless tobacco: Not on file      Comment: using patches in between     Alcohol use No      Comment: 1 a year     Drug use: No     Sexual activity: Not Currently     Partners: Male     Birth control/ protection: None     Other Topics Concern     Not on file     Social History Narrative      Current Outpatient Prescriptions   Medication Sig Dispense Refill     albuterol (PROAIR HFA;PROVENTIL HFA;VENTOLIN HFA) 90 mcg/actuation inhaler Inhale 2 puffs every 6 (six) hours as needed for wheezing. 8.5 g 0     omeprazole (PRILOSEC) 20 MG capsule Take 20 mg by mouth.       rizatriptan (MAXALT-MLT) 10 MG disintegrating tablet Take 1 tablet (10 mg total) by mouth as needed for migraine. 10 tablet 1     mirtazapine (REMERON) 15 MG tablet TAKE 1 TABLET(15 MG) BY MOUTH AT  "BEDTIME 90 tablet 3     mirtazapine (REMERON) 30 MG tablet Take 0.5 tablets (15 mg total) by mouth at bedtime. 90 tablet 1     omeprazole (PRILOSEC) 40 MG capsule TK 1 C PO QD  11     OXYCONTIN 10 mg 12 hr tablet   0     predniSONE (DELTASONE) 10 MG tablet TK  3 TS PO D X 4 DAYS THEN 2 D X 4 DAYS THEN 1 D X 4 DAYS THEN STOP  1     No current facility-administered medications for this visit.       Objective:   Vital Signs:   Visit Vitals     /76     Pulse 78     Temp 98.6  F (37  C)     Ht 5' 3\" (1.6 m)     Wt 148 lb 3.2 oz (67.2 kg)     BMI 26.25 kg/m2        VisionScreening:  No exam data present     PHYSICAL EXAM  Physical Examination: General appearance - alert, well appearing, and in no distress and normal appearing weight  Mental status - normal mood, behavior, speech, dress, motor activity, and thought processes  Eyes - pupils equal and reactive, extraocular eye movements intact  Ears - bilateral TM's and external ear canals normal  Nose - normal and patent, no erythema, discharge or polyps  Mouth - mucous membranes moist, pharynx normal without lesions  Neck - supple, no significant adenopathy, carotids upstroke normal bilaterally, no bruits, thyroid exam: thyroid is normal in size without nodules or tenderness  Lymphatics - no palpable lymphadenopathy, no hepatosplenomegaly  Chest - clear to auscultation, no wheezes, rales or rhonchi, symmetric air entry  Heart - normal rate and regular rhythm, S1 and S2 normal, no murmurs noted  Abdomen - soft, nontender, nondistended, no masses or organomegaly  Breasts - breasts appear normal, no suspicious masses, no skin or nipple changes or axillary nodes  Pelvic - normal external genitalia, vulva, vagina, cervix, uterus and adnexa, PAP: Pap smear done today  Back exam - full range of motion, no tenderness, palpable spasm or pain on motion  Neurological - alert, oriented, normal speech, no focal findings or movement disorder noted, cranial nerves II through XII " intact, DTR's normal and symmetric  Musculoskeletal - no joint tenderness, deformity or swelling,  Hands with some larger knuckles  Extremities - peripheral pulses normal, no pedal edema, no clubbing or cyanosis  Skin - normal coloration and turgor, no rashes, no suspicious skin lesions noted      Assessment Results 12/18/2017   Activities of Daily Living No help needed   Instrumental Activities of Daily Living 1 - Full function   Get Up and Go Score Less than 12 seconds   Mini Cog Total Score 4   Some recent data might be hidden     A Mini-Cog score of 0-2 suggests the possibility of dementia, score of 3-5 suggests no dementia    Identified Health Risks:     She is at risk for lack of exercise and has been provided with information to increase physical activity for the benefit of her well-being.  The patient reports that she has difficulty with instrumental activities of daily living.  She was not provided with a list of local organizations that provide support services Because her limitations are due to pain and previous disability. Patient has no other needs.  The patient was not provided with written information regarding signs of hearing loss. Patient has been assessed previously for this complaint and at this time does not wish further assessment.  She is at risk for falling and has been provided with information to reduce the risk of falling at home.  Information regarding advance directives (living boo), including where she can download the appropriate form, was provided to the patient via the AVS.  Patient was giving honoring choices.

## 2021-06-15 PROBLEM — G43.909 MIGRAINE: Status: ACTIVE | Noted: 2017-05-12

## 2021-06-16 PROBLEM — M85.89 OSTEOPENIA OF MULTIPLE SITES: Status: ACTIVE | Noted: 2018-01-11

## 2021-06-16 PROBLEM — Z86.19 HX OF HEPATITIS C: Status: ACTIVE | Noted: 2021-05-21

## 2021-06-16 PROBLEM — M25.511 CHRONIC RIGHT SHOULDER PAIN: Status: ACTIVE | Noted: 2019-05-14

## 2021-06-16 PROBLEM — G89.29 CHRONIC RIGHT SHOULDER PAIN: Status: ACTIVE | Noted: 2019-05-14

## 2021-06-16 NOTE — TELEPHONE ENCOUNTER
Telephone Encounter by Gena Gale at 5/16/2019  3:16 PM     Author: Gena Gale Service: -- Author Type: --    Filed: 5/16/2019  3:17 PM Encounter Date: 5/15/2019 Status: Signed    : Gena Gale APPROVED:    Approval start date: 2/15/19  Approval end date: 5/16/20    Pharmacy has been notified of approval and will contact patient when medication is ready for pickup.

## 2021-06-17 NOTE — PROGRESS NOTES
Please call Nettie and inform her that her EKG was normal.  The x-ray of her neck shows significant degenerative changes in the spine with narrowing of the disc is quite severe at the C4-C5 level.  I believe that the symptoms she is having in her right shoulder and down the right arm is related to her neck and I would like to have her seen by physical therapy.  I placed a referral to physical therapy and she should get a call to set up a time in place to do that.  The [facility] location [facility] be [facility].    She had a question about the need to start a additional inhaler for her lungs and I do think that is a good idea.  I sent a prescription for Incruse inhaler to her pharmacy for mail out.    Make sure she is taking the new medication called amlodipine for her high blood pressure.  We will plan to see her back in the clinic on June 2 for a follow-up blood pressure check.    Thank you,  Dr. Marie

## 2021-06-17 NOTE — PROGRESS NOTES
ASSESSMENT/PLAN:       1. Radiculitis, cervical    - XR Cervical Spine Flexion Extension 2-3; Future, x-ray shows cervical spondylosis with facet joint arthritis and anterior spondylolisthesis C3 on C4 of 3.6 mm  She also has severe disc space narrowing at C4-C5  The patient may benefit from some physical therapy for her neck    2. Benign essential hypertension    - amLODIPine (NORVASC) 5 MG tablet; Take 1 tablet (5 mg total) by mouth daily.  Dispense: 90 tablet; Refill: 3  The patient will need to return in a couple weeks for recheck of her blood pressure with this new start taking amlodipine    3. COPD (chronic obstructive pulmonary disease) (H)  I think the patient would benefit from using a anticholinergic inhaler such as Incruse and will recommend that and send it to the pharmacy  Hopefully will not need to use the albuterol as much as she is now    - albuterol (PROAIR HFA;PROVENTIL HFA;VENTOLIN HFA) 90 mcg/actuation inhaler; INHALE 2 PUFFS BY MOUTH EVERY 6 HOURS AS NEEDED FOR WHEEZING  Dispense: 54 g; Refill: 3    4. Atypical chest pain  The patient's symptoms do not sound typical for angina  Could be related to her smoking history and recently outside work.    - Electrocardiogram Perform and Read, reviewed and normal    5. Benzodiazepine abuse (H)  Avoid prescribing benzodiazepines    6. Tobacco dependence  Encourage smoking cessation and offered assistance    7. Screen for colon cancer    - Cologuard    Telephone call with test results  Follow-up 2 weeks recheck blood pressure and arm symptoms  Office visit E/M total time 33 minutes  28 minutes spent face-to-face with the patient and an additional 5 minutes spent in previsit care in regard to her previous studies that involved her respiratory and cardiac system as well as her blood pressure.  Time was spent reviewing the x-ray of her neck.    I have counseled the patient for tobacco cessation and the follow up will occur  at the next visit.    Martín  MD Cynthia      PROGRESS NOTE   5/21/2021    SUBJECTIVE:  Nettie Paul is a 66 y.o. female  who presents for   Chief Complaint   Patient presents with     Numbness     Numbness and tingling in right arm. This started about 3 weeks. Has paralyzed the arm before as well as shoulder surgery.      Chest Pain     Going on for a couple months. Sharp pains.      Shortness of Breath     Should pt be on a maintenance inhaler?     Medication Refill     Would like 10 inhalers.      1. Radiculitis, cervical, right  The patient was recently doing a lot of planting of flowers and her garden 3 weeks ago she noticed some discomfort in the right shoulder and arm.  She has had a history of a fracture to her right shoulder and neck about 10 years ago.  She does not notice a lot of neck pain at this point.  The symptoms include paresthesias into her right hand and also a feeling of weakness in the right arm and hand.  Patient is right-hand dominant.    2. Benign essential hypertension  I note that the patient's blood pressure is quite elevated today and she is not had a history of hypertension.  She has noticed some palpitations and some chest pain at times.    3. COPD (chronic obstructive pulmonary disease) (H)  She is noticing that she is using her albuterol inhaler a couple times every day.  She wonders if she should be on a daily medication for maintenance so that she would not have to use her albuterol so much.  Pulmonary function tests have been ordered but not completed  Note that past history of a chest x-ray showed hyperinflation    4. Atypical chest pain  For the last couple months the patient has had some atypical chest pain which comes and goes and she describes it as sharp pain in the anterior upper chest which does not radiate to her neck or down her arm.  It can occur with activity and she has noted some increased shortness of breath but she attributes that to her smoking and her lung problems.  The symptoms seem  to be associated with exposure to COVID-19 and she did have a decrease in smell but had a negative test.  Patient's  and other family members at that time all tested positive for Covid.    5. Benzodiazepine abuse (H)  Patient has had a history of overuse of benzodiazepines and currently is not on any medications for anxiety.    6. Tobacco dependence  The patient has cut back on her tobacco use and 1 pack of cigarettes will now last her a week.    7. Screen for colon cancer  The patient is overdue for colon cancer screening and is agreeable to doing the stool DNA Cologuard test    Patient Active Problem List   Diagnosis     Hyperlipidemia     Hypothyroidism     Vitamin D deficiency     Tobacco dependence     COPD (chronic obstructive pulmonary disease) (H)     Anxiety     Insomnia     Alcohol abuse     Opioid abuse (H)     Benzodiazepine abuse (H)     Migraine     Osteopenia of multiple sites     Chronic right shoulder pain     Hx of hepatitis C       Current Outpatient Medications   Medication Sig Dispense Refill     albuterol (PROAIR HFA;PROVENTIL HFA;VENTOLIN HFA) 90 mcg/actuation inhaler INHALE 2 PUFFS BY MOUTH EVERY 6 HOURS AS NEEDED FOR WHEEZING 54 g 3     aspirin 81 MG EC tablet Take 1 tablet (81 mg total) by mouth daily. 150 tablet 2     albuterol (PROVENTIL) 2.5 mg /3 mL (0.083 %) nebulizer solution Take 3 mL (2.5 mg total) by nebulization every 4 (four) hours as needed for wheezing. 25 vial 7     amLODIPine (NORVASC) 5 MG tablet Take 1 tablet (5 mg total) by mouth daily. 90 tablet 3     No current facility-administered medications for this visit.        Social History     Tobacco Use   Smoking Status Current Some Day Smoker     Packs/day: 0.50     Types: Cigarettes   Smokeless Tobacco Never Used   Tobacco Comment    using patches in between; given smokiing cessation information           OBJECTIVE:        Recent Results (from the past 240 hour(s))   Electrocardiogram Perform and Read   Result Value  "Ref Range    SYSTOLIC BLOOD PRESSURE      DIASTOLIC BLOOD PRESSURE      VENTRICULAR RATE 66 BPM    ATRIAL RATE 66 BPM    P-R INTERVAL 170 ms    QRS DURATION 88 ms    Q-T INTERVAL 398 ms    QTC CALCULATION (BEZET) 417 ms    P Axis 37 degrees    R AXIS 32 degrees    T AXIS 37 degrees    MUSE DIAGNOSIS       Sinus rhythm  Normal ECG  No previous ECGs available  Confirmed by ELÍAS CALDERON MD LOC:[facility] ([id number]) on 5/19/2021 2:15:31 PM         Vitals:    05/19/21 1255 05/19/21 1318   BP: (!) 168/96 (!) 180/94   Patient Site:  Right Arm   Patient Position:  Sitting   Cuff Size:  Adult Large   Pulse: 73    SpO2: 97%    Weight: 147 lb (66.7 kg)    Height: 5' 1.75\" (1.568 m)      Weight: 147 lb (66.7 kg)          Physical Exam:  GENERAL APPEARANCE: 66-year-old female very pleasant, NAD, well hydrated, well nourished  SKIN:  Normal skin turgor, no lesions/rashes   HEENT: moist mucous membranes, no rhinorrhea  NECK: Normal without adenopathy or masses, no carotid bruits  CV: RRR, no M/G/R, the patient does not have any chest wall tenderness  LUNGS: Symmetrically decreased breath sounds without rales rhonchi or wheezes  ABDOMEN: S&NT, no masses or enlarged organs, no abdominal bruits  EXTREMITY: no edema and full ROM of all joints, note that the patient complains of shoulder pain but she has full range of motion of her right shoulder and does not have any palpable tenderness in the right shoulder.  NEURO: I feel that the pain that she is experiencing in her right shoulder and upper arm is coming from the neck.  She does have a positive Spurling test on the right side reproduced with extension of the neck.  Deep tendon reflexes in the right upper extremity arm hypoactive compared to the left primarily the brachial radialis reflex    "

## 2021-06-19 NOTE — LETTER
Letter by Martín Marie MD at      Author: Martín Marie MD Service: -- Author Type: --    Filed:  Encounter Date: 5/9/2019 Status: (Other)         Nettie Paul  [address] [facility] [address]             May 9, 2019         Dear Ms. Paul,    Below are the results from your recent visit:    Resulted Orders   Hepatitis C Antibody (Anti-HCV)   Result Value Ref Range    Hepatitis C Ab Positive (!) Negative   Hepatitis B Surface Antigen (HBsAG)   Result Value Ref Range    Hepatitis B Surface Ag Negative Negative   Hepatitis B Surface Antibody (Anti-HBs)   Result Value Ref Range    Hep B Surface Antibody Negative Negative   Hepatic Profile   Result Value Ref Range    Bilirubin, Total 0.3 0.0 - 1.0 mg/dL    Bilirubin, Direct 0.2 <=0.5 mg/dL    Protein, Total 6.9 6.0 - 8.0 g/dL    Albumin 3.9 3.5 - 5.0 g/dL    Alkaline Phosphatase 75 45 - 120 U/L    AST 29 0 - 40 U/L    ALT 29 0 - 45 U/L   Hepatitis C RNA Quantitation by RT-PCR   Result Value Ref Range    HCV RNA Quant [id number] (!) HCVND [IU]/mL      Comment:      The MARK AmpliPrep/MARK TaqMan HCV Test is an FDA-approved in vitro nucleic  acid amplification test for the quantitation of HCV RNA in human plasma (ETDA  plasma) or serum using the MARK AmpliPrep Instrument for automated viral  nucleic acid extraction and the MARK TaqMan Analyzer or MARK TaqMan for  automated Real Time PCR amplification and detection of the viral nucleic acid  target.  Titer results are reported in International Units/mL (IU/mL) using the 1st WHO  International standard for HCV for Nucleic Acid Amplification based assays.    Log of HCV RNA Qt 6.8 (H) <1.2 Log IU/mL      Comment:      Performed and/or entered by:  [facility]  [address] [place] [address]   HM1 (CBC with Diff)   Result Value Ref Range    WBC 8.1 4.0 - 11.0 thou/uL    RBC 4.71 3.80 - 5.40 mill/uL    Hemoglobin 14.8 12.0 - 16.0 g/dL    Hematocrit 44.6 35.0  - 47.0 %    MCV 95 80 - 100 fL    MCH 31.4 27.0 - 34.0 pg    MCHC 33.2 32.0 - 36.0 g/dL    RDW 11.7 11.0 - 14.5 %    Platelets 278 140 - 440 thou/uL    MPV 8.2 7.0 - 10.0 fL    Neutrophils % 66 50 - 70 %    Lymphocytes % 25 20 - 40 %    Monocytes % 6 2 - 10 %    Eosinophils % 3 0 - 6 %    Basophils % 1 0 - 2 %    Neutrophils Absolute 5.3 2.0 - 7.7 thou/uL    Lymphocytes Absolute 2.0 0.8 - 4.4 thou/uL    Monocytes Absolute 0.5 0.0 - 0.9 thou/uL    Eosinophils Absolute 0.2 0.0 - 0.4 thou/uL    Basophils Absolute 0.1 0.0 - 0.2 thou/uL   Hepatitis C Virus High-Resolution Genotype by Sequencing   Result Value Ref Range    Hepatitis C High-Res Genotype by Seq 1a       Comment:      INTERPRETIVE INFORMATION: Hepatitis C High Resolution Genotype     Hepatitis C viral RNA is assayed using reverse transcription   polymerase chain reaction (RT-PCR) to amplify specific portions of   both the Core and NS5B regions of the viral genome. The amplified   nucleic acid is sequenced bi-directionally using dye-terminator   chemistry ([employer]). Sequencing data is compared to a database of   characterized sequences.     Isolates of hepatitis C virus are grouped into six major   genotypes(1-6). These genotypes are subtyped according to sequence   characteristics. Sequencing both the Core and NS5B regions allows   for subtyping of all confirmed and most provisional genotypes,   including differentiation of 1a from 1b and typing of genotype 6.    Test developed and characteristics determined by [facility]. See Compliance Statement B: Standardized Safety/CS  Performed by [facility],  [address] [place] [id number] [phone]  [email or web address], Axel Sandy MD, Lab. Director       Nettie, your test results are listed above.  The hepatitis C serology shows that you have antibodies to hepatitis C but also have hepatitis C virus in your blood meaning that you have active hepatitis C.  The genotype of your hepatitis C is in the class of 1a.   This class of hepatitis C gives you a number of different options for the new group of medications.  I hope that you been able to get an appointment with the liver specialist at [facility].  Please take this letter with you to that appointment so they can see these results and not have to repeat some of the testing.    There were no other results in your blood work that were of concern.    Please call with questions or contact us using [employer].    Best Regards,        Electronically signed by Martín Marie MD

## 2021-06-26 NOTE — PROGRESS NOTES
ASSESSMENT/PLAN:       1. Spondylolisthesis of cervical region    - Ambulatory referral to Adult PT- External  The patient would like to utilize [facility] for physical therapy  She may need some advanced imaging such as MRI and referral to spine care if not improving  The patient would prefer to try physical therapy first which is reasonable caution will be needed in regard to her spondylolisthesis with some instability at C3-C4    2. Cervical spondylosis without myelopathy    - Ambulatory referral to Adult PT- External  The patient has severe disc disease at C4-C5    3. Facet arthropathy, cervical  Moderately severe noted at C3-C4 and C4-C5, bilateral    - Ambulatory referral to Adult PT- External    4. Benign essential hypertension  Blood pressure is adequately controlled now and will continue with amlodipine 5 mg daily    5. COPD (chronic obstructive pulmonary disease) (H)  Symptomatically her COPD seems improved with the use of Incruse inhaler in addition to her albuterol as needed    6. Hx of hepatitis C  Hepatitis C is undetectable on blood testing after therapy    Physical therapy will need to be approved by [employer].  Phone number and  is Nayeli at [phone]  Follow-up 6 months annual wellness visit  E/M office visit total time 33 minutes  28 minutes spent face-to-face with the patient and an additional 5 minutes spent reviewing her test results from the last visit and comparing results to previous records  Care everywhere [facility]    I have counseled the patient for tobacco cessation and the follow up will occur  at the next visit.    Martín Marie MD      PROGRESS NOTE   6/12/2021    SUBJECTIVE:  Nettie Paul is a 66 y.o. female  who presents for   Chief Complaint   Patient presents with     Medication Management     Blood Pressure Check     Test Results     On Neck Xray.      The patient is here for follow-up in regard to her cervical spine.  She was involved in a  work-related accident over 10 years ago when she fell off scaffolding.  She had a fracture to her cervical spine that did not require surgery.  The past few months she has had increasing pain in her neck with radiation of symptoms down the right arm.  She has paresthesias in the right arm.  Her muscles seem to fatigue easily on the right side arm.  X-rays of the cervical spine showed evidence for spondylolisthesis C3-C4 with 3.6 mm with flexion and 1.7 mm with extension.  Cervical spondylosis was noted diffusely but most severe at C4-C5 with disc space narrowing.  Facet joint arthropathy noted most predominantly at C3-C4 and C4-C5.  The patient is using over-the-counter medications for pain  The patient has been disabled totally since her accident    The patient about 3 weeks ago was started on amlodipine for blood pressure 5 mg and blood pressure seems to be improved.  No side effects such as constipation or swelling.    The patient was also placed on Incruse inhaler for her COPD and has noticed some improvement with her breathing.  The patient also uses albuterol for symptom relief.    The patient has listed hepatitis C as an active problem but she has a history of hepatitis C has been treated and has had undetected hepatitis C RNA blood testing.             Patient Active Problem List   Diagnosis     Hyperlipidemia     Hypothyroidism     Vitamin D deficiency     Tobacco dependence     COPD (chronic obstructive pulmonary disease) (H)     Anxiety     Insomnia     Alcohol abuse     Opioid abuse (H)     Benzodiazepine abuse (H)     Migraine     Osteopenia of multiple sites     Chronic right shoulder pain     Hx of hepatitis C       Current Outpatient Medications   Medication Sig Dispense Refill     albuterol (PROAIR HFA;PROVENTIL HFA;VENTOLIN HFA) 90 mcg/actuation inhaler INHALE 2 PUFFS BY MOUTH EVERY 6 HOURS AS NEEDED FOR WHEEZING 54 g 3     amLODIPine (NORVASC) 5 MG tablet Take 1 tablet (5 mg total) by mouth daily.  "90 tablet 3     aspirin 81 MG EC tablet Take 1 tablet (81 mg total) by mouth daily. 150 tablet 2     albuterol (PROVENTIL) 2.5 mg /3 mL (0.083 %) nebulizer solution Take 3 mL (2.5 mg total) by nebulization every 4 (four) hours as needed for wheezing. 25 vial 7     umeclidinium (INCRUSE ELLIPTA) 62.5 mcg/actuation DsDv inhaler Inhale 1 puff daily. 1 Inhaler 11     No current facility-administered medications for this visit.        Social History     Tobacco Use   Smoking Status Current Some Day Smoker     Packs/day: 0.50     Types: Cigarettes   Smokeless Tobacco Never Used   Tobacco Comment    using patches in between; given smokiing cessation information           OBJECTIVE:        No results found for this or any previous visit (from the past 240 hour(s)).    Vitals:    06/10/21 1633   BP: 130/82   Pulse: 80   SpO2: 96%   Weight: 145 lb (65.8 kg)   Height: 5' 1.75\" (1.568 m)     Weight: 145 lb (65.8 kg)          Physical Exam:  GENERAL APPEARANCE: 66-year-old female very pleasant, NAD, well hydrated, well nourished  SKIN:  Normal skin turgor, no lesions/rashes   HEENT: moist mucous membranes, no rhinorrhea  NECK: Normal without adenopathy or masses, the patient has decreased range of motion of her neck  CV: RRR, no M/G/R   LUNGS: CTAB  ABDOMEN: S&NT, no masses or enlarged organs   EXTREMITY: no edema and full ROM of all joints  NEURO: Decreased reflexes right upper extremity mild at the tricep and bicep but more significant at the brachial radialis reflex compared to the left side.  No significant weakness noted today    "

## 2021-07-03 NOTE — ADDENDUM NOTE
Addendum Note by Martín Marie MD at 8/18/2020 12:21 PM     Author: Martín Marie MD Service: -- Author Type: Physician    Filed: 8/18/2020 12:21 PM Encounter Date: 8/18/2020 Status: Signed    : Martín Marie MD (Physician)    Addended by: MARTÍN MARIE on: 8/18/2020 12:21 PM        Modules accepted: Orders

## 2021-07-03 NOTE — ADDENDUM NOTE
Addendum Note by Leandra Sanchez CMA at 8/18/2020 10:30 AM     Author: Leandra Sanchez CMA Service: -- Author Type: Certified Medical Assistant    Filed: 8/18/2020 10:30 AM Encounter Date: 8/18/2020 Status: Signed    : Leandra Sanchez CMA (Certified Medical Assistant)    Addended by: LEANDRA SANCHEZ on: 8/18/2020 10:30 AM        Modules accepted: Orders

## 2021-07-03 NOTE — ADDENDUM NOTE
Addendum Note by Nuzhat Ramey at 5/1/2019  9:00 AM     Author: Nuzhat Ramey Service: -- Author Type:     Filed: 5/6/2019 10:15 AM Encounter Date: 5/1/2019 Status: Signed    : Nuzhat Ramey ()    Addended by: NUZHAT RAMEY on: 5/6/2019 10:15 AM        Modules accepted: Orders

## 2021-07-14 PROBLEM — B18.2 HEPATITIS C, CHRONIC (H): Status: RESOLVED | Noted: 2021-06-10 | Resolved: 2021-06-12

## 2021-10-27 ENCOUNTER — OFFICE VISIT (OUTPATIENT)
Dept: FAMILY MEDICINE | Facility: CLINIC | Age: 66
End: 2021-10-27
Payer: MEDICARE

## 2021-10-27 DIAGNOSIS — J01.90 ACUTE SINUSITIS WITH SYMPTOMS > 10 DAYS: ICD-10-CM

## 2021-10-27 DIAGNOSIS — J44.9 CHRONIC OBSTRUCTIVE PULMONARY DISEASE, UNSPECIFIED COPD TYPE (H): ICD-10-CM

## 2021-10-27 DIAGNOSIS — N63.0 LUMP OR MASS IN BREAST: Primary | ICD-10-CM

## 2021-10-27 DIAGNOSIS — I10 BENIGN ESSENTIAL HYPERTENSION: ICD-10-CM

## 2021-10-27 DIAGNOSIS — Z23 HIGH PRIORITY FOR 2019-NCOV VACCINE: ICD-10-CM

## 2021-10-27 PROCEDURE — 91300 COVID-19,PF,PFIZER (12+ YRS): CPT | Performed by: FAMILY MEDICINE

## 2021-10-27 PROCEDURE — 99215 OFFICE O/P EST HI 40 MIN: CPT | Mod: 25 | Performed by: FAMILY MEDICINE

## 2021-10-27 PROCEDURE — 0001A COVID-19,PF,PFIZER (12+ YRS): CPT | Performed by: FAMILY MEDICINE

## 2021-10-27 RX ORDER — FLUTICASONE PROPIONATE 50 MCG
2 SPRAY, SUSPENSION (ML) NASAL DAILY
Qty: 16 G | Refills: 0 | Status: SHIPPED | OUTPATIENT
Start: 2021-10-27 | End: 2022-12-14

## 2021-10-27 RX ORDER — ALBUTEROL SULFATE 90 UG/1
AEROSOL, METERED RESPIRATORY (INHALATION)
Qty: 54 G | Refills: 3 | Status: SHIPPED | OUTPATIENT
Start: 2021-10-27 | End: 2022-11-09

## 2021-10-27 RX ORDER — AMLODIPINE BESYLATE 5 MG/1
5 TABLET ORAL DAILY
Qty: 90 TABLET | Refills: 3 | Status: SHIPPED | OUTPATIENT
Start: 2021-10-27 | End: 2022-11-11

## 2021-10-27 ASSESSMENT — ANXIETY QUESTIONNAIRES
3. WORRYING TOO MUCH ABOUT DIFFERENT THINGS: NOT AT ALL
7. FEELING AFRAID AS IF SOMETHING AWFUL MIGHT HAPPEN: NOT AT ALL
GAD7 TOTAL SCORE: 0
1. FEELING NERVOUS, ANXIOUS, OR ON EDGE: NOT AT ALL
4. TROUBLE RELAXING: NOT AT ALL
GAD7 TOTAL SCORE: 0
GAD7 TOTAL SCORE: 0
7. FEELING AFRAID AS IF SOMETHING AWFUL MIGHT HAPPEN: NOT AT ALL
2. NOT BEING ABLE TO STOP OR CONTROL WORRYING: NOT AT ALL
8. IF YOU CHECKED OFF ANY PROBLEMS, HOW DIFFICULT HAVE THESE MADE IT FOR YOU TO DO YOUR WORK, TAKE CARE OF THINGS AT HOME, OR GET ALONG WITH OTHER PEOPLE?: NOT DIFFICULT AT ALL
5. BEING SO RESTLESS THAT IT IS HARD TO SIT STILL: NOT AT ALL
6. BECOMING EASILY ANNOYED OR IRRITABLE: NOT AT ALL

## 2021-10-27 ASSESSMENT — PATIENT HEALTH QUESTIONNAIRE - PHQ9
SUM OF ALL RESPONSES TO PHQ QUESTIONS 1-9: 0
10. IF YOU CHECKED OFF ANY PROBLEMS, HOW DIFFICULT HAVE THESE PROBLEMS MADE IT FOR YOU TO DO YOUR WORK, TAKE CARE OF THINGS AT HOME, OR GET ALONG WITH OTHER PEOPLE: NOT DIFFICULT AT ALL
SUM OF ALL RESPONSES TO PHQ QUESTIONS 1-9: 0

## 2021-10-27 NOTE — PROGRESS NOTES
Assessment / Plan    Nettie was seen today for medication therapy management, mass, sinus problem and imm/inj.    Diagnoses and all orders for this visit:    Lump or mass in breast  -     Mammo Diagnostic Digital Left; Future  -     Mammo Diagnostic Digital Right; Future  -     Cancel: US Breast Right Limited 1-3 Quadrants; Future  -     Cancel: US Breast Left Limited 1-3 Quadrants; Future    High priority for 2019-nCoV vaccine  -     COVID-19,PF,PFIZER    Acute sinusitis with symptoms > 10 days  -     fluticasone (FLONASE) 50 MCG/ACT nasal spray; Spray 2 sprays into both nostrils daily  -     amoxicillin-clavulanate (AUGMENTIN) 875-125 MG tablet; Take 1 tablet by mouth 2 times daily for 10 days  -     fluticasone (FLONASE) 50 MCG/ACT nasal spray; Spray 2 sprays into both nostrils daily    Benign essential hypertension  -     amLODIPine (NORVASC) 5 MG tablet; Take 1 tablet (5 mg) by mouth daily    Chronic obstructive pulmonary disease, unspecified COPD type (H)  -     albuterol (PROAIR HFA/PROVENTIL HFA/VENTOLIN HFA) 108 (90 Base) MCG/ACT inhaler; INHALE 2 PUFFS BY MOUTH EVERY 6 HOURS AS NEEDED FOR WHEEZING    Other orders  -     REVIEW OF HEALTH MAINTENANCE PROTOCOL ORDERS  -     PFIZER COVID-19 VACCINE 2ND DOSE APPT; Future    patient received first dose of COVID-19 vaccine today. She is advised to follow-up for 2nd dose in 3 weeks.     Continue current meds for hypertension and COPD.     Patient will treat acute sinusitis with Augmentin and flonase as per orders.    Follow-up recommended if symptoms are not beginning to improve within next 5 days.     Patient Instructions   I sent in prescriptions for augmentin (antibiotic) and flonase to be used for the next 10 days for sinus infection.      [facility]  I've also asked my assistant to phone the lung clinic to ask them to call you to help set up an appointment for pulmonary function testing.       I don't feel any distinct lumps in the breasts today, but given that you've noted lumps and some tenderness I would recommend mammograms and ultrasounds.  Please call [facility] [place] at [phone] to schedule these tests.     Please schedule follow-up in 3 weeks for your second dose of the pfizer COVID-19 vaccine.      Return in about 3 weeks (around 11/17/2021) for second dose of covid-19 vaccine .    48 minutes spent on the date of the encounter doing chart review, history and exam, documentation and further activities per the note.    Subjective   Nettie Paul is a 66 year old year old female who presents for medication follow-up visit regarding the following issues:     Hypertension - currently taking amlodipine 5mg daily.  Blood pressure seems to be reasonably well controlled recently. No chest pain, palpitations, shortness of breath, or peripheral swelling.     COPD/tobacco use disorder - patient currently treating with Incruse ellipta inhaler as well as as needed albuterol. Patient had been seen by her PCP, Dr Marie in October of last year and he had recommended that she have formal PFTs performed. It does not appear that this was actually completed, however.      History of hepatitis C but notes that she previously completed treatment and has subsequently had undetectable hep C RNA testing.     Patient reports increased frequency of frontal headaches and nasal congestion for the past 2 weeks with some associated fatigue/malaise.  Symptoms seem to be gradually worsening.  No associated fevers or chills.  No cough or increased work of breathing.  Patient notes that she has had sinus infections in the past and feels like that is what she has going on currently.  She has been using tylenol and antihistamines without much improvement.  She does try to use saline rinses, but is having difficulty with solution flowing through nasal passageways.      Breast lumps -patient has recently noted  indistinct lumps present at the upper outer aspects of both breasts.  Sister recently diagnosed with breast cancer, which has patient more concerned.  No skin dimpling or changers in appearance of breasts.  No nipple discharge.  No tenderness or breast pain noted.       Patient Active Problem List   Diagnosis     Hyperlipidemia     Hypothyroidism     Vitamin D deficiency     Tobacco dependence     COPD (chronic obstructive pulmonary disease) (H)     Anxiety     Insomnia     Alcohol abuse     Opioid abuse (H)     Benzodiazepine abuse (H)     Migraine     Osteopenia of multiple sites     Chronic right shoulder pain     Hx of hepatitis C     Past Surgical History:   Procedure Laterality Date     ARTHROSCOPY HIP       ARTHROSCOPY SHOULDER ROTATOR CUFF REPAIR Right      C/SECTION, LOW TRANSVERSE       ORIF FEMUR FRACTURE         Social History     Tobacco Use     Smoking status: Current Some Day Smoker     Packs/day: 0.50     Types: Cigarettes, Cigarettes     Smokeless tobacco: Never Used     Tobacco comment: using patches in between; given smokiing cessation information   Substance Use Topics     Alcohol use: No     Comment: Alcoholic Drinks/day: 1 a year     Family History   Problem Relation Age of Onset     Cirrhosis Mother      Alcoholism Mother      Coronary Artery Disease Father      Alcoholism Father      Coronary Artery Disease Sister      Seizure Disorder Sister      Breast Cancer Sister      Hypertension Brother          Current Outpatient Medications   Medication Sig Dispense Refill     albuterol (PROAIR HFA;PROVENTIL HFA;VENTOLIN HFA) 90 mcg/actuation inhaler [ALBUTEROL (PROAIR HFA;PROVENTIL HFA;VENTOLIN HFA) 90 MCG/ACTUATION INHALER] INHALE 2 PUFFS BY MOUTH EVERY 6 HOURS AS NEEDED FOR WHEEZING 54 g 3     albuterol (PROVENTIL) 2.5 mg /3 mL (0.083 %) nebulizer solution [ALBUTEROL (PROVENTIL) 2.5 MG /3 ML (0.083 %) NEBULIZER SOLUTION] Take 3 mL (2.5 mg total) by nebulization every 4 (four) hours as needed for  "wheezing. 25 vial 7     amLODIPine (NORVASC) 5 MG tablet [AMLODIPINE (NORVASC) 5 MG TABLET] Take 1 tablet (5 mg total) by mouth daily. 90 tablet 3     aspirin 81 MG EC tablet [ASPIRIN 81 MG EC TABLET] Take 1 tablet (81 mg total) by mouth daily. 150 tablet 2     umeclidinium (INCRUSE ELLIPTA) 62.5 mcg/actuation DsDv inhaler [UMECLIDINIUM (INCRUSE ELLIPTA) 62.5 MCG/ACTUATION DSDV INHALER] Inhale 1 puff daily. 1 Inhaler 11     Allergies   Allergen Reactions     Codeine Unknown     Gabapentin Other (See Comments)     \"feels spacey, woozy, dizzy, off-balance, not clear\"       ROS:   Negative except as noted above in HPI.     Objective  /84   Pulse 83   Wt 64.9 kg (143 lb)   SpO2 95%   BMI 26.37 kg/m       General: Alert, no acute distress.   Affect: pleasant, cooperative.  HEENT: normocephalic/atraumatic, conjunctivae are clear, Normal TMs bilaterally without erythema, pus or fluid. Nasal mucosa are inflamed and swollen.  Tender to percussion over the paranasal sinuses..  Oropharynx is moist and clear, without tonsillar hypertrophy, asymmetry, erythema or exudate.   Neck: supple without thyromegaly.  Mild cervical adenopathy.   Lungs: mild end expiratory wheezing noted throughout lung fields.    Heart: regular rate and rhythm, normal S1 and S2   Breasts:  normal without suspicious masses, skin changes or axillary nodes, symmetric fibrous changes in both upper outer quadrants. No areas of tenderness.    Juan F Barker MD   Family medicine physician  [facility]             "

## 2021-10-27 NOTE — PATIENT INSTRUCTIONS
I sent in prescriptions for augmentin (antibiotic) and flonase to be used for the next 10 days for sinus infection.      I [facility]  I've also asked my assistant to phone the lung clinic to ask them to call you to help set up an appointment for pulmonary function testing.      I don't feel any distinct lumps in the breasts today, but given that you've noted lumps and some tenderness I would recommend mammograms and ultrasounds.  Please call [facility] [place] at [phone] to schedule these tests.     Please schedule follow-up in 3 weeks for your second dose of the pfizer COVID-19 vaccine.

## 2021-10-28 DIAGNOSIS — J44.9 CHRONIC OBSTRUCTIVE PULMONARY DISEASE, UNSPECIFIED COPD TYPE (H): Primary | ICD-10-CM

## 2021-10-28 ASSESSMENT — PATIENT HEALTH QUESTIONNAIRE - PHQ9: SUM OF ALL RESPONSES TO PHQ QUESTIONS 1-9: 0

## 2021-10-28 ASSESSMENT — ANXIETY QUESTIONNAIRES: GAD7 TOTAL SCORE: 0

## 2021-11-09 ENCOUNTER — HOSPITAL ENCOUNTER (OUTPATIENT)
Dept: MAMMOGRAPHY | Facility: CLINIC | Age: 66
End: 2021-11-09
Attending: FAMILY MEDICINE
Payer: MEDICARE

## 2021-11-09 ENCOUNTER — HOSPITAL ENCOUNTER (OUTPATIENT)
Dept: ULTRASOUND IMAGING | Facility: CLINIC | Age: 66
End: 2021-11-09
Attending: FAMILY MEDICINE
Payer: MEDICARE

## 2021-11-09 VITALS
DIASTOLIC BLOOD PRESSURE: 84 MMHG | OXYGEN SATURATION: 95 % | WEIGHT: 143 LBS | BODY MASS INDEX: 26.37 KG/M2 | HEART RATE: 83 BPM | SYSTOLIC BLOOD PRESSURE: 134 MMHG

## 2021-11-09 DIAGNOSIS — N63.0 BREAST LUMP OR MASS: ICD-10-CM

## 2021-11-09 DIAGNOSIS — N64.4 BREAST PAIN, LEFT: ICD-10-CM

## 2021-11-09 DIAGNOSIS — N63.10 BILATERAL BREAST LUMP: ICD-10-CM

## 2021-11-09 DIAGNOSIS — N63.21 UNSPECIFIED LUMP IN THE LEFT BREAST, UPPER OUTER QUADRANT: ICD-10-CM

## 2021-11-09 DIAGNOSIS — N63.20 BILATERAL BREAST LUMP: ICD-10-CM

## 2021-11-09 PROCEDURE — 76642 ULTRASOUND BREAST LIMITED: CPT | Mod: 50

## 2021-11-09 PROCEDURE — 77062 BREAST TOMOSYNTHESIS BI: CPT

## 2021-12-10 ENCOUNTER — TELEPHONE (OUTPATIENT)
Dept: NURSING | Facility: CLINIC | Age: 66
End: 2021-12-10
Payer: MEDICARE

## 2021-12-10 NOTE — TELEPHONE ENCOUNTER
Patient calling with request for a refill of antibiotic rx. (Augmentin was prescribed in October). Advised that she would need to be evaluated first. Suggested [facility]. Declines triage. States she knows what is wrong and just wants the antibiotic.  Nicole Beaulieu RN  [facility]

## 2022-01-18 VITALS
SYSTOLIC BLOOD PRESSURE: 130 MMHG | HEART RATE: 78 BPM | OXYGEN SATURATION: 96 % | WEIGHT: 144.7 LBS | DIASTOLIC BLOOD PRESSURE: 78 MMHG | BODY MASS INDEX: 25.63 KG/M2

## 2022-01-18 VITALS
BODY MASS INDEX: 25.1 KG/M2 | HEART RATE: 83 BPM | OXYGEN SATURATION: 97 % | SYSTOLIC BLOOD PRESSURE: 138 MMHG | WEIGHT: 141.7 LBS | DIASTOLIC BLOOD PRESSURE: 78 MMHG

## 2022-01-18 VITALS
BODY MASS INDEX: 27.05 KG/M2 | OXYGEN SATURATION: 97 % | WEIGHT: 147 LBS | DIASTOLIC BLOOD PRESSURE: 94 MMHG | HEART RATE: 73 BPM | SYSTOLIC BLOOD PRESSURE: 180 MMHG | HEIGHT: 62 IN

## 2022-01-18 VITALS
HEART RATE: 89 BPM | SYSTOLIC BLOOD PRESSURE: 138 MMHG | DIASTOLIC BLOOD PRESSURE: 68 MMHG | WEIGHT: 146.8 LBS | OXYGEN SATURATION: 99 % | BODY MASS INDEX: 26 KG/M2

## 2022-01-18 VITALS
OXYGEN SATURATION: 96 % | HEART RATE: 80 BPM | BODY MASS INDEX: 26.68 KG/M2 | SYSTOLIC BLOOD PRESSURE: 130 MMHG | HEIGHT: 62 IN | DIASTOLIC BLOOD PRESSURE: 82 MMHG | WEIGHT: 145 LBS

## 2022-01-18 ASSESSMENT — PATIENT HEALTH QUESTIONNAIRE - PHQ9
SUM OF ALL RESPONSES TO PHQ QUESTIONS 1-9: 1
SUM OF ALL RESPONSES TO PHQ QUESTIONS 1-9: 2

## 2022-01-19 ASSESSMENT — ANXIETY QUESTIONNAIRES: GAD7 TOTAL SCORE: 0

## 2022-04-03 ENCOUNTER — APPOINTMENT (OUTPATIENT)
Dept: RADIOLOGY | Facility: CLINIC | Age: 67
End: 2022-04-03
Attending: EMERGENCY MEDICINE
Payer: MEDICARE

## 2022-04-03 ENCOUNTER — HOSPITAL ENCOUNTER (EMERGENCY)
Facility: CLINIC | Age: 67
Discharge: HOME | End: 2022-04-03
Attending: EMERGENCY MEDICINE | Admitting: EMERGENCY MEDICINE
Payer: MEDICARE

## 2022-04-03 VITALS
BODY MASS INDEX: 26.92 KG/M2 | SYSTOLIC BLOOD PRESSURE: 160 MMHG | WEIGHT: 146 LBS | RESPIRATION RATE: 16 BRPM | HEART RATE: 85 BPM | DIASTOLIC BLOOD PRESSURE: 86 MMHG | OXYGEN SATURATION: 97 % | TEMPERATURE: 97.3 F

## 2022-04-03 DIAGNOSIS — K21.00 GASTROESOPHAGEAL REFLUX DISEASE WITH ESOPHAGITIS WITHOUT HEMORRHAGE: ICD-10-CM

## 2022-04-03 LAB
ALBUMIN SERPL-MCNC: 4.3 G/DL (ref 3.5–5)
ALP SERPL-CCNC: 79 U/L (ref 45–120)
ALT SERPL W P-5'-P-CCNC: 53 U/L (ref 0–45)
ANION GAP SERPL CALCULATED.3IONS-SCNC: 15 MMOL/L (ref 5–18)
AST SERPL W P-5'-P-CCNC: 53 U/L (ref 0–40)
ATRIAL RATE - MUSE: 77 BPM
BILIRUB SERPL-MCNC: 0.7 MG/DL (ref 0–1)
BUN SERPL-MCNC: 11 MG/DL (ref 8–22)
CALCIUM SERPL-MCNC: 10.5 MG/DL (ref 8.5–10.5)
CHLORIDE BLD-SCNC: 103 MMOL/L (ref 98–107)
CO2 SERPL-SCNC: 21 MMOL/L (ref 22–31)
CREAT SERPL-MCNC: 0.69 MG/DL (ref 0.6–1.1)
DIASTOLIC BLOOD PRESSURE - MUSE: NORMAL MMHG
ERYTHROCYTE [DISTWIDTH] IN BLOOD BY AUTOMATED COUNT: 12.8 % (ref 10–15)
GFR SERPL CREATININE-BSD FRML MDRD: >90 ML/MIN/1.73M2
GLUCOSE BLD-MCNC: 111 MG/DL (ref 70–125)
HCT VFR BLD AUTO: 49.3 % (ref 35–47)
HGB BLD-MCNC: 16.8 G/DL (ref 11.7–15.7)
INTERPRETATION ECG - MUSE: NORMAL
LIPASE SERPL-CCNC: 29 U/L (ref 0–52)
MCH RBC QN AUTO: 30.4 PG (ref 26.5–33)
MCHC RBC AUTO-ENTMCNC: 34.1 G/DL (ref 31.5–36.5)
MCV RBC AUTO: 89 FL (ref 78–100)
P AXIS - MUSE: 34 DEGREES
PLATELET # BLD AUTO: 275 10E3/UL (ref 150–450)
POTASSIUM BLD-SCNC: 4.1 MMOL/L (ref 3.5–5)
PR INTERVAL - MUSE: 154 MS
PROT SERPL-MCNC: 7.8 G/DL (ref 6–8)
QRS DURATION - MUSE: 86 MS
QT - MUSE: 380 MS
QTC - MUSE: 430 MS
R AXIS - MUSE: 41 DEGREES
RBC # BLD AUTO: 5.52 10E6/UL (ref 3.8–5.2)
SODIUM SERPL-SCNC: 139 MMOL/L (ref 136–145)
SYSTOLIC BLOOD PRESSURE - MUSE: NORMAL MMHG
T AXIS - MUSE: 47 DEGREES
TROPONIN I SERPL-MCNC: <0.01 NG/ML (ref 0–0.29)
TROPONIN T BLD-MCNC: 0 UG/L
VENTRICULAR RATE- MUSE: 77 BPM
WBC # BLD AUTO: 8.3 10E3/UL (ref 4–11)

## 2022-04-03 PROCEDURE — 82040 ASSAY OF SERUM ALBUMIN: CPT | Performed by: EMERGENCY MEDICINE

## 2022-04-03 PROCEDURE — 71046 X-RAY EXAM CHEST 2 VIEWS: CPT

## 2022-04-03 PROCEDURE — 84484 ASSAY OF TROPONIN QUANT: CPT | Performed by: EMERGENCY MEDICINE

## 2022-04-03 PROCEDURE — 85027 COMPLETE CBC AUTOMATED: CPT | Performed by: EMERGENCY MEDICINE

## 2022-04-03 PROCEDURE — 36415 COLL VENOUS BLD VENIPUNCTURE: CPT | Performed by: EMERGENCY MEDICINE

## 2022-04-03 PROCEDURE — 93005 ELECTROCARDIOGRAM TRACING: CPT | Performed by: EMERGENCY MEDICINE

## 2022-04-03 PROCEDURE — 99285 EMERGENCY DEPT VISIT HI MDM: CPT | Mod: 25

## 2022-04-03 PROCEDURE — 83690 ASSAY OF LIPASE: CPT | Performed by: EMERGENCY MEDICINE

## 2022-04-03 PROCEDURE — 80053 COMPREHEN METABOLIC PANEL: CPT | Performed by: EMERGENCY MEDICINE

## 2022-04-03 RX ORDER — PANTOPRAZOLE SODIUM 40 MG/1
40 TABLET, DELAYED RELEASE ORAL DAILY
Qty: 30 TABLET | Refills: 0 | Status: SHIPPED | OUTPATIENT
Start: 2022-04-03 | End: 2022-05-03

## 2022-04-03 NOTE — DISCHARGE INSTRUCTIONS
Protonix daily for the next month.  Work on quitting smoking to decrease stomach acid production.  Mylanta 1 teaspoon every 4-6 hours for the next few days until Protonix starts working.  See your clinic later this week for follow-up.  See your clinic sooner or return if worse or problems.

## 2022-04-03 NOTE — ED PROVIDER NOTES
EMERGENCY DEPARTMENT ENCOUNTER      NAME: Nettie Paul  AGE: 67 year old female  YOB: 1955  MRN: [id number]  EVALUATION DATE & TIME: 4/3/2022  8:55 AM    PCP: Martín Marie    ED PROVIDER: Ivan Portillo M.D.      Chief Complaint   Patient presents with     Chest Pain         FINAL IMPRESSION:  1.  GERD.      ED COURSE & MEDICAL DECISION MAKIN:09 AM I met with the patient to gather history and to perform my initial exam. We discussed plans for the ED course, including diagnostic testing and treatment. PPE worn: cloth mask.  Patient notes chest pain for approximately 18 to 24 hours continuously.  She described a burning sensation that started in the mid stomach and epigastric stomach up into the upper chest.  Relieved with Tums or with Tums combined with aspirin.  She has no cardiac history.  Patient does have a history of GERD.  She does smoke but is down to half a pack a day.  11:25 AM.  EKG unremarkable.  Chest x-ray unremarkable.  Chemistries unremarkable and bicarbonate 21.  Troponin i-STAT came back at 0.  CBC unremarkable.  Lipase and liver function test unremarkable.  Given a negative troponin despite 24 hours of symptoms this effectively rules out an acute MI.  Pain also described as burning and originating in the epigastric area.  I do suspect that this is acid reflux disease.  We will start the patient on Protonix and Mylanta.  Patient in agreement with the plan.    Pertinent Labs & Imaging studies reviewed. (See chart for details)  67 year old female presents to the Emergency Department for evaluation of chest pain.    At the conclusion of the encounter I discussed the results of all of the tests and the disposition. The questions were answered. The patient or family acknowledged understanding and was agreeable with the care plan.                   MEDICATIONS GIVEN IN THE EMERGENCY:  Medications - No data to display    NEW PRESCRIPTIONS STARTED AT TODAY'S ER VISIT  New  Prescriptions    No medications on file          =================================================================    HPI    Patient information was obtained from: Patient    Use of : N/A       Nettie Paul is a 67 year old female with a pertinent history of COPD, anxiety, HLD, hypothyroidism, and osteopenia, who presents to this ED by car for evaluation of chest pain.    Patient reports chest pain started yesterday afternoon and felt like acid reflux. She states the pain start in the belly and works its way up to her chest, she endorses it is a little like a burning sensation. She reports the pain was constant since onset into this morning. Patient notes taking baby aspirin and Tums, which helped for a bit. Patient endorses a history of acid reflux that she used to take a prescribed medication for. She notes she did not eat a lot for dinner yesterday. Patient denies the discomfort is currently present.    Social: current smoker, recently reduced from a pack a day to 1/2 pack per day. Denies use of drugs.    She does not identify any waxing or waning symptoms otherwise, exacerbating or alleviating features,associated symptoms except as mentioned. Patient denies any pain related complaints.    REVIEW OF SYSTEMS   Review of Systems   Cardiovascular: Positive for chest pain.   All other systems reviewed and are negative.       PAST MEDICAL HISTORY:  Past Medical History:   Diagnosis Date     COPD (chronic obstructive pulmonary disease) (H)      Dyslexia, developmental      GERD (gastroesophageal reflux disease)      Hep C w/o coma, chronic (H)     had failed treatment, saw ANGEL LUIS      (normal spontaneous vaginal delivery)        PAST SURGICAL HISTORY:  Past Surgical History:   Procedure Laterality Date     ARTHROSCOPY HIP       ARTHROSCOPY SHOULDER ROTATOR CUFF REPAIR Right      C/SECTION, LOW TRANSVERSE       ORIF FEMUR FRACTURE             CURRENT MEDICATIONS:    albuterol (PROAIR HFA/PROVENTIL  "HFA/VENTOLIN HFA) 108 (90 Base) MCG/ACT inhaler  albuterol (PROVENTIL) 2.5 mg /3 mL (0.083 %) nebulizer solution  amLODIPine (NORVASC) 5 MG tablet  aspirin 81 MG EC tablet  fluticasone (FLONASE) 50 MCG/ACT nasal spray  fluticasone (FLONASE) 50 MCG/ACT nasal spray  umeclidinium (INCRUSE ELLIPTA) 62.5 mcg/actuation DsDv inhaler        ALLERGIES:  Allergies   Allergen Reactions     Codeine Unknown     Gabapentin Other (See Comments)     \"feels spacey, woozy, dizzy, off-balance, not clear\"       FAMILY HISTORY:  Family History   Problem Relation Age of Onset     Cirrhosis Mother      Alcoholism Mother      Coronary Artery Disease Father      Alcoholism Father      Coronary Artery Disease Sister      Seizure Disorder Sister      Breast Cancer Sister      Hypertension Brother        SOCIAL HISTORY:   Social History     Socioeconomic History     Marital status: Single     Spouse name: Not on file     Number of children: 3     Years of education: 10     Highest education level: Not on file   Occupational History     Not on file   Tobacco Use     Smoking status: Current Some Day Smoker     Packs/day: 0.50     Types: Cigarettes     Smokeless tobacco: Never Used     Tobacco comment: using patches in between; given smokiing cessation information   Substance and Sexual Activity     Alcohol use: No     Comment: Alcoholic Drinks/day: 1 a year     Drug use: No     Sexual activity: Not Currently     Partners: Male     Birth control/protection: None   Other Topics Concern     Not on file   Social History Narrative     Not on file     Social Determinants of Health     Financial Resource Strain: Not on file   Food Insecurity: Not on file   Transportation Needs: Not on file   Physical Activity: Not on file   Stress: Not on file   Social Connections: Not on file   Intimate Partner Violence: Not on file   Housing Stability: Not on file   Chart review indicates a former history of opiate and benzodiazepine use and abuse.  History of past " alcoholism.  No current drugs or alcohol.  Patient smokes tobacco.    VITALS:  BP (!) 167/100   Pulse 87   Temp 97.3  F (36.3  C) (Temporal)   Resp 18   Wt 66.2 kg (146 lb)   SpO2 97%   BMI 26.92 kg/m      PHYSICAL EXAM    Vital Signs:  BP (!) 167/100   Pulse 87   Temp 97.3  F (36.3  C) (Temporal)   Resp 18   Wt 66.2 kg (146 lb)   SpO2 97%   BMI 26.92 kg/m    General:  On entering the room  is in no apparent distress.    Neck:  Neck supple with full range of motion and nontender.    Back:  Back and spine are nontender.  No costovertebral angle tenderness.    HEENT:  Oropharynx clear with moist mucous membranes.  HEENT unremarkable.    Pulmonary:  Chest clear to auscultation without rhonchi rales or wheezing.  Not reproduced with palpation, inspiration, and movement.  Cardiovascular:  Cardiac regular rate and rhythm without murmurs rubs or gallops.    Abdomen:  Abdomen soft nontender.  Epigastric discomfort.  There is no rebound or guarding.    Muskuloskeletal:  she moves all 4 without any difficulty and has normal neurovascular exams.  Extremities without clubbing, cyanosis, or edema.  Legs and calves are nontender.    Neuro:  she is alert and oriented ×3 and moves all extremities symmetrically.    Psych:  Normal affect.    Skin:  Unremarkable and warm and dry.       LAB:  All pertinent labs reviewed and interpreted.  Labs Ordered and Resulted from Time of ED Arrival to Time of ED Departure   COMPREHENSIVE METABOLIC PANEL - Abnormal       Result Value    Sodium 139      Potassium 4.1      Chloride 103      Carbon Dioxide (CO2) 21 (*)     Anion Gap 15      Urea Nitrogen 11      Creatinine 0.69      Calcium 10.5      Glucose 111      Alkaline Phosphatase 79      AST 53 (*)     ALT 53 (*)     Protein Total 7.8      Albumin 4.3      Bilirubin Total 0.7      GFR Estimate >90     CBC WITH PLATELETS - Abnormal    WBC Count 8.3      RBC Count 5.52 (*)     Hemoglobin 16.8 (*)     Hematocrit 49.3 (*)     MCV 89       MCH 30.4      MCHC 34.1      RDW 12.8      Platelet Count 275     LIPASE - Normal    Lipase 29     ISTAT TROPONIN POCT - Normal    TROPPC POCT 0.00     TROPONIN I       RADIOLOGY:  Reviewed all pertinent imaging. Please see official radiology report.  Chest XR,  PA & LAT   Final Result   IMPRESSION: Scoliosis. The lungs are clear. No change from previous.                 EKG:    No previous EKG for comparison.  Normal sinus rhythm at 77.  No acute findings.    I have independently reviewed and interpreted the EKG(s) documented above.    PROCEDURES:         I, Marlyn Zee, am serving as a scribe to document services personally performed by Dr. Portillo based on my observation and the provider's statements to me. I, Ivan Portillo MD attest that Marlyn Zee is acting in a scribe capacity, has observed my performance of the services and has documented them in accordance with my direction.    Ivan Portillo M.D.  Emergency Medicine  [facility] EMERGENCY ROOM  [address] [facility] [id number] [phone]  Dept: [phone]     Ivan Portillo MD  04/03/22 [id number]

## 2022-04-03 NOTE — ED TRIAGE NOTES
Pt c/o left chest pain since yesterday and jaw pain and indigestion, got worse today, taking baby asa and tums. Denies hx of cardiac issues, Hx HTN

## 2022-08-01 DIAGNOSIS — J44.9 COPD (CHRONIC OBSTRUCTIVE PULMONARY DISEASE) (H): ICD-10-CM

## 2022-08-02 NOTE — TELEPHONE ENCOUNTER
"  Last Written Prescription Date:  5/21/2021  Last Fill Quantity: 3,  # refills: 3   Last office visit provider:  10/21/2021     Requested Prescriptions   Pending Prescriptions Disp Refills     INCRUSE ELLIPTA 62.5 MCG/INH Inhaler [Pharmacy Med Name: UMECLIDINIUM {INCRUSE 62.5MCG 30D INHL]  11     Sig: INHALE 1 PUFF BY MOUTH EVERY DAY - (DISCARD INHALER 6 WEEKS AFTER OPENING FOIL TRAY OR WHEN EMPTY)       Asthma Maintenance Inhalers - Anticholinergics Passed - 8/1/2022 10:47 AM        Passed - Patient is age 12 years or older        Passed - Recent (12 mo) or future (30 days) visit within the authorizing provider's specialty     Patient has had an office visit with the authorizing provider or a provider within the authorizing providers department within the previous 12 mos or has a future within next 30 days. See \"Patient Info\" tab in inbasket, or \"Choose Columns\" in Meds & Orders section of the refill encounter.              Passed - Medication is active on med list             Dipika Sun RN 08/02/22 1:29 PM  "

## 2022-11-21 ENCOUNTER — NURSE TRIAGE (OUTPATIENT)
Dept: NURSING | Facility: CLINIC | Age: 67
End: 2022-11-21

## 2022-11-21 NOTE — TELEPHONE ENCOUNTER
"Nurse Triage SBAR    Is this a 2nd Level Triage? YES, LICENSED PRACTITIONER REVIEW IS REQUIRED    Situation: Patient calling stating she was seen two weeks ago at the [facility]. She reports she was prescribed antibiotics that were sent to the wrong pharmacy and is requesting a Zithromax prescription.     Background:  There is no documentation of patient having an office visit in the last two weeks. Refill encounter states that she came into clinic to request medication refills of her inhaler and nebulizer. She reports it was at that time that she was also prescribed an antibiotic. No antibiotic is listed on active med list and she did not have an office visit with provider at this time.     Informed patient that antibiotic would not have likely been prescribed without an assessment by a provider. She states frustration that she is having such a hard time getting an antibiotic.     Assessment: She reports the following symptoms:   Headache  Cough  Runny nose   History of COPD-Using nebulizer   Intermittent chest pain  Feeling dizzy   \"Lots of wheezing\"   SOB at rest.     Protocol Recommended Disposition:   Go to ED Now    Recommendation:   Per protocol, patient was advised to go to ED for assessment. She declines. She is requesting an in person appointment today for antibiotics. Former patient of Dr. Marie- she has an appointment to establish care with Dr. Locke 12/14.   Could someone from Dr. Locke care team please advise?   Is there any availability in clinic for this patient to be seen?     Routed to provider    Does the patient meet one of the following criteria for ADS visit consideration? 16+ years old, with an [facility] PCP     TIP  Providers, please consider if this condition is appropriate for management at one of our Acute and Diagnostic Services sites.     If patient is a good candidate, please use dotphrase <dot>triageresponse and select Refer to ADS to document.                      Reason for " Disposition    MODERATE difficulty breathing (e.g., speaks in phrases, SOB even at rest, pulse 100-120) of new-onset or worse than normal    Additional Information    Negative: SEVERE difficulty breathing (e.g., struggling for each breath, speaks in single words, pulse > 120)    Negative: Breathing stopped and hasn't returned    Negative: Bluish (or gray) lips or face    Negative: Choking on something    Negative: Difficult to awaken or acting confused (e.g., disoriented, slurred speech)    Negative: Passed out (i.e., fainted, collapsed and was not responding)    Negative: Wheezing started suddenly after medicine, an allergic food, or bee sting    Negative: Stridor    Negative: Slow, shallow and weak breathing    Negative: Sounds like a life-threatening emergency to the triager    Protocols used: BREATHING DIFFICULTY-A-OH

## 2022-11-21 NOTE — TELEPHONE ENCOUNTER
Provider Response to 2nd Level Triage Request    I have reviewed the RN documentation. My recommendation is:  Refer to Urgent Care     Carina Locke MD on 11/21/2022 at 12:23 PM'

## 2022-11-21 NOTE — TELEPHONE ENCOUNTER
Patient notified she needs an appointment for an antibiotic. Patient and  both upset.    Idalmis Tello RN on 11/21/2022 at 1:11 PM

## 2022-12-14 ENCOUNTER — OFFICE VISIT (OUTPATIENT)
Dept: FAMILY MEDICINE | Facility: CLINIC | Age: 67
End: 2022-12-14
Payer: MEDICARE

## 2022-12-14 VITALS
DIASTOLIC BLOOD PRESSURE: 74 MMHG | RESPIRATION RATE: 16 BRPM | BODY MASS INDEX: 26.55 KG/M2 | SYSTOLIC BLOOD PRESSURE: 136 MMHG | HEART RATE: 80 BPM | HEIGHT: 62 IN | WEIGHT: 144.3 LBS | TEMPERATURE: 98.4 F

## 2022-12-14 DIAGNOSIS — Z12.11 COLON CANCER SCREENING: ICD-10-CM

## 2022-12-14 DIAGNOSIS — I10 BENIGN ESSENTIAL HYPERTENSION: ICD-10-CM

## 2022-12-14 DIAGNOSIS — J44.9 CHRONIC OBSTRUCTIVE PULMONARY DISEASE, UNSPECIFIED COPD TYPE (H): Primary | ICD-10-CM

## 2022-12-14 DIAGNOSIS — J32.9 SINUSITIS, UNSPECIFIED CHRONICITY, UNSPECIFIED LOCATION: ICD-10-CM

## 2022-12-14 PROCEDURE — 99214 OFFICE O/P EST MOD 30 MIN: CPT | Performed by: FAMILY MEDICINE

## 2022-12-14 RX ORDER — ALBUTEROL SULFATE 90 UG/1
AEROSOL, METERED RESPIRATORY (INHALATION)
Qty: 18 G | Refills: 11 | Status: SHIPPED | OUTPATIENT
Start: 2022-12-14 | End: 2023-05-10

## 2022-12-14 RX ORDER — ALBUTEROL SULFATE 0.83 MG/ML
2.5 SOLUTION RESPIRATORY (INHALATION) EVERY 4 HOURS PRN
Qty: 75 ML | Refills: 3 | Status: SHIPPED | OUTPATIENT
Start: 2022-12-14 | End: 2023-09-14

## 2022-12-14 RX ORDER — AMLODIPINE BESYLATE 5 MG/1
5 TABLET ORAL DAILY
Qty: 90 TABLET | Refills: 3 | Status: SHIPPED | OUTPATIENT
Start: 2022-12-14 | End: 2023-05-10

## 2022-12-14 RX ORDER — FLUTICASONE PROPIONATE 50 MCG
2 SPRAY, SUSPENSION (ML) NASAL DAILY
Qty: 16 G | Refills: 4 | Status: SHIPPED | OUTPATIENT
Start: 2022-12-14 | End: 2024-03-15

## 2022-12-14 ASSESSMENT — PAIN SCALES - GENERAL: PAINLEVEL: NO PAIN (0)

## 2022-12-14 NOTE — PROGRESS NOTES
Assessment & Plan     (J44.9) Chronic obstructive pulmonary disease, unspecified COPD type (H)  (primary encounter diagnosis)  Comment: pt has copd and she uses her inhalers as instructed. No side effect. No wheezing and sob. Pt has dry cough for one month and is getting better. No fever and sick contact. Home covid test was negative. Pt has smoked for 5 years 1.25 pack years. Is cutting down. Exam is not remarkable.   Plan: Spirometry, Breathing Capacity, Normal Order,         Clinic Performed, HIV Antigen Antibody Combo,         albuterol (PROAIR HFA/PROVENTIL HFA/VENTOLIN         HFA) 108 (90 Base) MCG/ACT inhaler,         umeclidinium (INCRUSE ELLIPTA) 62.5 MCG/ACT         inhaler, fluticasone (FLONASE) 50 MCG/ACT nasal        spray        Continue current plan. Strongly advise to quit smoke.     (I10) Benign essential hypertension  Comment: bp reasonable control. She checks bp at home sometimes and usually around 120/80. Denies cp, palpitation, headache and blurry vision.   Plan: amLODIPine (NORVASC) 5 MG tablet        Continue current medication. Strongly advise to monitor bp at home regularly. If bp is < 100/60 she will hold the medication.        (Z12.11) Colon cancer screening  Comment: strongly advise to have colonoscope.   Plan: Colonoscopy Screening [facility] Referral            (J32.9) Sinusitis, unspecified chronicity, unspecified location  Comment: pt has cough, nose congestion, sinus pressure for one month. Cough is getting better, sinus pressure is getting worse. No fever, wheezing and sob. Home covid test negative. No sick contact. Exam is not remarkable other than sinus moderate tenderness. Likely she has sinusitis.   Plan: amoxicillin-clavulanate (AUGMENTIN) 875-125 MG         tablet        Will take abx, side effect addressed. Advise rest and push fluid. Call me if symptoms persists.       Nicotine/Tobacco Cessation:  She reports that she has been smoking cigarettes. She has a 1.25 pack-year  "smoking history. She has never used smokeless tobacco.  Nicotine/Tobacco Cessation Plan:   Information offered: Patient not interested at this time      BMI:   Estimated body mass index is 26.39 kg/m  as calculated from the following:    Height as of this encounter: 1.575 m (5' 2\").    Weight as of this encounter: 65.5 kg (144 lb 4.8 oz).   Weight management plan: Discussed healthy diet and exercise guidelines     Return in about 4 weeks (around 1/11/2023) for Routine preventive, Follow up.    Carina Locke MD  [facility] COOPER Sheffield is a 67 year old, presenting for the following health issues:  Establish Care, Recheck Medication (Non fasting), and Illness (Congestion and cough x 1 mo- Neg covid testing)      History of Present Illness       Reason for visit:  Est care/ med check    She eats 2-3 servings of fruits and vegetables daily.She consumes 2 sweetened beverage(s) daily.She exercises with enough effort to increase her heart rate 30 to 60 minutes per day.  She exercises with enough effort to increase her heart rate 4 days per week.   She is taking medications regularly.        Review of Systems   Constitutional, HEENT, cardiovascular, pulmonary, gi and gu systems are negative, except as otherwise noted.      Objective    /74 (BP Location: Left arm, Patient Position: Sitting, Cuff Size: Adult Regular)   Pulse 80   Temp 98.4  F (36.9  C) (Oral)   Resp 16   Ht 1.575 m (5' 2\")   Wt 65.5 kg (144 lb 4.8 oz)   BMI 26.39 kg/m    Body mass index is 26.39 kg/m .  Physical Exam   GENERAL: healthy, alert and no distress  HENT: ear canals and TM's normal, nose and mouth without ulcers or lesions. Moderate sinus tenderness.   NECK: no adenopathy, no asymmetry, masses, or scars and thyroid normal to palpation  RESP: lungs clear to auscultation - no rales, rhonchi or wheezes  CV: regular rate and rhythm, normal S1 S2, no S3 or S4, no murmur, click or rub, no peripheral edema and " peripheral pulses strong  ABDOMEN: soft, nontender, no hepatosplenomegaly, no masses and bowel sounds normal  MS: no gross musculoskeletal defects noted, no edema

## 2023-01-24 ENCOUNTER — TELEPHONE (OUTPATIENT)
Dept: FAMILY MEDICINE | Facility: CLINIC | Age: 68
End: 2023-01-24
Payer: MEDICARE

## 2023-01-25 ENCOUNTER — OFFICE VISIT (OUTPATIENT)
Dept: FAMILY MEDICINE | Facility: CLINIC | Age: 68
End: 2023-01-25
Payer: MEDICARE

## 2023-01-25 VITALS
OXYGEN SATURATION: 97 % | WEIGHT: 145 LBS | HEIGHT: 62 IN | BODY MASS INDEX: 26.68 KG/M2 | TEMPERATURE: 98.5 F | RESPIRATION RATE: 12 BRPM | DIASTOLIC BLOOD PRESSURE: 66 MMHG | SYSTOLIC BLOOD PRESSURE: 124 MMHG | HEART RATE: 73 BPM

## 2023-01-25 DIAGNOSIS — F33.9 EPISODE OF RECURRENT MAJOR DEPRESSIVE DISORDER, UNSPECIFIED DEPRESSION EPISODE SEVERITY (H): ICD-10-CM

## 2023-01-25 DIAGNOSIS — G47.00 INSOMNIA, UNSPECIFIED TYPE: ICD-10-CM

## 2023-01-25 DIAGNOSIS — F10.29 ALCOHOL DEPENDENCE WITH UNSPECIFIED ALCOHOL-INDUCED DISORDER (H): ICD-10-CM

## 2023-01-25 DIAGNOSIS — F41.9 ANXIETY: Primary | ICD-10-CM

## 2023-01-25 PROCEDURE — 99214 OFFICE O/P EST MOD 30 MIN: CPT | Performed by: PHYSICIAN ASSISTANT

## 2023-01-25 PROCEDURE — 96127 BRIEF EMOTIONAL/BEHAV ASSMT: CPT | Performed by: PHYSICIAN ASSISTANT

## 2023-01-25 RX ORDER — HYDROXYZINE HYDROCHLORIDE 25 MG/1
25 TABLET, FILM COATED ORAL AT BEDTIME
Qty: 30 TABLET | Refills: 5 | Status: SHIPPED | OUTPATIENT
Start: 2023-01-25 | End: 2023-05-10

## 2023-01-25 ASSESSMENT — ANXIETY QUESTIONNAIRES
5. BEING SO RESTLESS THAT IT IS HARD TO SIT STILL: SEVERAL DAYS
GAD7 TOTAL SCORE: 6
IF YOU CHECKED OFF ANY PROBLEMS ON THIS QUESTIONNAIRE, HOW DIFFICULT HAVE THESE PROBLEMS MADE IT FOR YOU TO DO YOUR WORK, TAKE CARE OF THINGS AT HOME, OR GET ALONG WITH OTHER PEOPLE: SOMEWHAT DIFFICULT
4. TROUBLE RELAXING: SEVERAL DAYS
GAD7 TOTAL SCORE: 6
3. WORRYING TOO MUCH ABOUT DIFFERENT THINGS: SEVERAL DAYS
6. BECOMING EASILY ANNOYED OR IRRITABLE: SEVERAL DAYS
7. FEELING AFRAID AS IF SOMETHING AWFUL MIGHT HAPPEN: NOT AT ALL
GAD7 TOTAL SCORE: 6
8. IF YOU CHECKED OFF ANY PROBLEMS, HOW DIFFICULT HAVE THESE MADE IT FOR YOU TO DO YOUR WORK, TAKE CARE OF THINGS AT HOME, OR GET ALONG WITH OTHER PEOPLE?: SOMEWHAT DIFFICULT
2. NOT BEING ABLE TO STOP OR CONTROL WORRYING: SEVERAL DAYS
1. FEELING NERVOUS, ANXIOUS, OR ON EDGE: SEVERAL DAYS
7. FEELING AFRAID AS IF SOMETHING AWFUL MIGHT HAPPEN: NOT AT ALL

## 2023-01-25 ASSESSMENT — ENCOUNTER SYMPTOMS
SHORTNESS OF BREATH: 0
DYSPHORIC MOOD: 1
LIGHT-HEADEDNESS: 0
DIZZINESS: 0
HEADACHES: 0
SLEEP DISTURBANCE: 1
HALLUCINATIONS: 1
WHEEZING: 0
HYPERACTIVE: 0
NERVOUS/ANXIOUS: 1
PALPITATIONS: 0
COUGH: 0
DECREASED CONCENTRATION: 1

## 2023-01-25 ASSESSMENT — PATIENT HEALTH QUESTIONNAIRE - PHQ9
SUM OF ALL RESPONSES TO PHQ QUESTIONS 1-9: 6
10. IF YOU CHECKED OFF ANY PROBLEMS, HOW DIFFICULT HAVE THESE PROBLEMS MADE IT FOR YOU TO DO YOUR WORK, TAKE CARE OF THINGS AT HOME, OR GET ALONG WITH OTHER PEOPLE: SOMEWHAT DIFFICULT
SUM OF ALL RESPONSES TO PHQ QUESTIONS 1-9: 6

## 2023-01-27 DIAGNOSIS — F41.9 ANXIETY: Primary | ICD-10-CM

## 2023-02-10 ENCOUNTER — TRANSFERRED RECORDS (OUTPATIENT)
Dept: MULTI SPECIALTY CLINIC | Facility: CLINIC | Age: 68
End: 2023-02-10

## 2023-02-28 ENCOUNTER — NURSE TRIAGE (OUTPATIENT)
Dept: NURSING | Facility: CLINIC | Age: 68
End: 2023-02-28
Payer: MEDICARE

## 2023-02-28 NOTE — TELEPHONE ENCOUNTER
Patient has a lump behind knee and now is getting higher and is on the left leg.  Lump was lower and then moved up.  Lump is painful down her leg and into her foot and is painful to touch.  Patient states that she will go to urgent care.      Reason for Disposition    SEVERE pain (e.g., excruciating)    Additional Information    Negative: Sounds like a life-threatening emergency to the triager    Negative: Hernia suspected (bulge in groin or abdomen) and painful or vomiting    Negative: Swollen lump in groin and pulsating (like heartbeat)    Negative: Patient sounds very sick or weak to the triager    Protocols used: SKIN LUMP OR LOCALIZED SWELLING-A-OH

## 2023-03-09 ENCOUNTER — TRANSFERRED RECORDS (OUTPATIENT)
Dept: HEALTH INFORMATION MANAGEMENT | Facility: CLINIC | Age: 68
End: 2023-03-09
Payer: MEDICARE

## 2023-04-19 ENCOUNTER — TELEPHONE (OUTPATIENT)
Dept: FAMILY MEDICINE | Facility: CLINIC | Age: 68
End: 2023-04-19
Payer: MEDICARE

## 2023-04-19 NOTE — TELEPHONE ENCOUNTER
Medication Question or Refill    Contacts       Type Contact Phone/Fax    04/19/2023 03:14 PM CDT Phone (Incoming) Nettie Paul (Self) [phone] (M)          What medication are you calling about (include dose and sig)?:   -albuterol (PROAIR HFA/PROVENTIL HFA/VENTOLIN HFA) 108 (90 Base) MCG/ACT inhaler  -umeclidinium (INCRUSE ELLIPTA) 62.5 MCG/ACT inhaler  -albuterol (PROVENTIL) (2.5 MG/3ML) 0.083% neb solution  -Patient also requesting refill on BP medicine, unknown     Preferred Pharmacy:   [place] [address] [address] [address] [address]  Phone: [phone] Fax: [phone]      Controlled Substance Agreement on file:   CSA -- Patient Level:    CSA: None found at the patient level.       Who prescribed the medication?: PCP    Do you need a refill? Yes    When did you use the medication last? 4/19/23    Patient offered an appointment? Yes: Patient scheduled for med check on 510.     Do you have any questions or concerns?  No Patient requesting a call back to verify prescriptions have been sent.       Okay to leave a detailed message?: Yes at Cell number on file:    Telephone Information:   Mobile [phone]

## 2023-05-10 ENCOUNTER — OFFICE VISIT (OUTPATIENT)
Dept: FAMILY MEDICINE | Facility: CLINIC | Age: 68
End: 2023-05-10
Payer: MEDICARE

## 2023-05-10 VITALS
BODY MASS INDEX: 26.5 KG/M2 | HEIGHT: 62 IN | TEMPERATURE: 98.3 F | HEART RATE: 84 BPM | SYSTOLIC BLOOD PRESSURE: 122 MMHG | RESPIRATION RATE: 12 BRPM | OXYGEN SATURATION: 98 % | WEIGHT: 144 LBS | DIASTOLIC BLOOD PRESSURE: 60 MMHG

## 2023-05-10 DIAGNOSIS — R74.8 ELEVATED LIVER ENZYMES: ICD-10-CM

## 2023-05-10 DIAGNOSIS — G47.00 INSOMNIA, UNSPECIFIED TYPE: ICD-10-CM

## 2023-05-10 DIAGNOSIS — E03.9 HYPOTHYROIDISM, UNSPECIFIED TYPE: Chronic | ICD-10-CM

## 2023-05-10 DIAGNOSIS — M85.89 OSTEOPENIA OF MULTIPLE SITES: ICD-10-CM

## 2023-05-10 DIAGNOSIS — Z11.59 ENCOUNTER FOR SCREENING FOR OTHER VIRAL DISEASES: ICD-10-CM

## 2023-05-10 DIAGNOSIS — Z13.1 SCREENING FOR DIABETES MELLITUS: ICD-10-CM

## 2023-05-10 DIAGNOSIS — E55.9 VITAMIN D DEFICIENCY: ICD-10-CM

## 2023-05-10 DIAGNOSIS — J44.9 CHRONIC OBSTRUCTIVE PULMONARY DISEASE, UNSPECIFIED COPD TYPE (H): Chronic | ICD-10-CM

## 2023-05-10 DIAGNOSIS — F41.9 ANXIETY: ICD-10-CM

## 2023-05-10 DIAGNOSIS — E78.2 MIXED HYPERLIPIDEMIA: Chronic | ICD-10-CM

## 2023-05-10 DIAGNOSIS — Z86.19 HX OF HEPATITIS C: ICD-10-CM

## 2023-05-10 DIAGNOSIS — I10 BENIGN ESSENTIAL HYPERTENSION: Primary | ICD-10-CM

## 2023-05-10 DIAGNOSIS — Z13.228 SCREENING FOR METABOLIC DISORDER: ICD-10-CM

## 2023-05-10 DIAGNOSIS — F10.29 ALCOHOL DEPENDENCE WITH UNSPECIFIED ALCOHOL-INDUCED DISORDER (H): ICD-10-CM

## 2023-05-10 DIAGNOSIS — F17.200 TOBACCO DEPENDENCE: ICD-10-CM

## 2023-05-10 LAB
ALBUMIN SERPL BCG-MCNC: 4.4 G/DL (ref 3.5–5.2)
ALP SERPL-CCNC: 83 U/L (ref 35–104)
ALT SERPL W P-5'-P-CCNC: 19 U/L (ref 10–35)
ANION GAP SERPL CALCULATED.3IONS-SCNC: 12 MMOL/L (ref 7–15)
AST SERPL W P-5'-P-CCNC: 31 U/L (ref 10–35)
BILIRUB SERPL-MCNC: 0.5 MG/DL
BUN SERPL-MCNC: 13.2 MG/DL (ref 8–23)
CALCIUM SERPL-MCNC: 9.3 MG/DL (ref 8.8–10.2)
CHLORIDE SERPL-SCNC: 105 MMOL/L (ref 98–107)
CHOLEST SERPL-MCNC: 207 MG/DL
CREAT SERPL-MCNC: 0.82 MG/DL (ref 0.51–0.95)
DEPRECATED HCO3 PLAS-SCNC: 25 MMOL/L (ref 22–29)
GFR SERPL CREATININE-BSD FRML MDRD: 78 ML/MIN/1.73M2
GLUCOSE SERPL-MCNC: 134 MG/DL (ref 70–99)
HBA1C MFR BLD: 6.3 % (ref 0–5.6)
HDLC SERPL-MCNC: 50 MG/DL
LDLC SERPL CALC-MCNC: 137 MG/DL
NONHDLC SERPL-MCNC: 157 MG/DL
POTASSIUM SERPL-SCNC: 3.7 MMOL/L (ref 3.4–5.3)
PROT SERPL-MCNC: 7.2 G/DL (ref 6.4–8.3)
SODIUM SERPL-SCNC: 142 MMOL/L (ref 136–145)
TRIGL SERPL-MCNC: 98 MG/DL
TSH SERPL DL<=0.005 MIU/L-ACNC: 2.45 UIU/ML (ref 0.3–4.2)

## 2023-05-10 PROCEDURE — 80053 COMPREHEN METABOLIC PANEL: CPT | Performed by: PHYSICIAN ASSISTANT

## 2023-05-10 PROCEDURE — 87522 HEPATITIS C REVRS TRNSCRPJ: CPT | Performed by: PHYSICIAN ASSISTANT

## 2023-05-10 PROCEDURE — 84443 ASSAY THYROID STIM HORMONE: CPT | Performed by: PHYSICIAN ASSISTANT

## 2023-05-10 PROCEDURE — 36415 COLL VENOUS BLD VENIPUNCTURE: CPT | Performed by: PHYSICIAN ASSISTANT

## 2023-05-10 PROCEDURE — 86803 HEPATITIS C AB TEST: CPT | Performed by: PHYSICIAN ASSISTANT

## 2023-05-10 PROCEDURE — 82306 VITAMIN D 25 HYDROXY: CPT | Performed by: PHYSICIAN ASSISTANT

## 2023-05-10 PROCEDURE — 99214 OFFICE O/P EST MOD 30 MIN: CPT | Performed by: PHYSICIAN ASSISTANT

## 2023-05-10 PROCEDURE — 83036 HEMOGLOBIN GLYCOSYLATED A1C: CPT | Performed by: PHYSICIAN ASSISTANT

## 2023-05-10 PROCEDURE — 80061 LIPID PANEL: CPT | Performed by: PHYSICIAN ASSISTANT

## 2023-05-10 RX ORDER — HYDROXYZINE HYDROCHLORIDE 25 MG/1
25 TABLET, FILM COATED ORAL AT BEDTIME
Qty: 90 TABLET | Refills: 3 | Status: SHIPPED | OUTPATIENT
Start: 2023-05-10 | End: 2024-03-15

## 2023-05-10 RX ORDER — ALBUTEROL SULFATE 90 UG/1
AEROSOL, METERED RESPIRATORY (INHALATION)
Qty: 18 G | Refills: 11 | Status: SHIPPED | OUTPATIENT
Start: 2023-05-10 | End: 2024-03-15

## 2023-05-10 RX ORDER — AMLODIPINE BESYLATE 5 MG/1
5 TABLET ORAL DAILY
Qty: 90 TABLET | Refills: 3 | Status: SHIPPED | OUTPATIENT
Start: 2023-05-10 | End: 2023-05-31

## 2023-05-10 ASSESSMENT — ENCOUNTER SYMPTOMS
NAUSEA: 0
WEAKNESS: 0
HEMATURIA: 0
DIZZINESS: 0
ABDOMINAL PAIN: 0
PARESTHESIAS: 0
CHILLS: 0
ARTHRALGIAS: 0
CONSTIPATION: 0
SHORTNESS OF BREATH: 1
EYE PAIN: 0
PALPITATIONS: 0
COUGH: 1
DYSURIA: 0
JOINT SWELLING: 0
HEMATOCHEZIA: 0
NERVOUS/ANXIOUS: 0
CHEST TIGHTNESS: 0
FREQUENCY: 0
DIARRHEA: 0
SORE THROAT: 0
HEARTBURN: 0
FEVER: 0
HEADACHES: 0
BREAST MASS: 0
MYALGIAS: 0

## 2023-05-10 ASSESSMENT — PATIENT HEALTH QUESTIONNAIRE - PHQ9
SUM OF ALL RESPONSES TO PHQ QUESTIONS 1-9: 5
SUM OF ALL RESPONSES TO PHQ QUESTIONS 1-9: 5

## 2023-05-10 ASSESSMENT — ACTIVITIES OF DAILY LIVING (ADL): CURRENT_FUNCTION: NO ASSISTANCE NEEDED

## 2023-05-10 NOTE — PROGRESS NOTES
Assessment & Plan       ICD-10-CM    1. Benign essential hypertension  I10 amLODIPine (NORVASC) 5 MG tablet      2. Mixed hyperlipidemia  E78.2 Lipid panel reflex to direct LDL Fasting     Lipid panel reflex to direct LDL Fasting      3. Hypothyroidism, unspecified type  E03.9 TSH with free T4 reflex     TSH with free T4 reflex      4. Chronic obstructive pulmonary disease, unspecified COPD type (H)  J44.9 albuterol (PROAIR HFA/PROVENTIL HFA/VENTOLIN HFA) 108 (90 Base) MCG/ACT inhaler      5. Anxiety  F41.9 hydrOXYzine (ATARAX) 25 MG tablet      6. Insomnia, unspecified type  G47.00       7. Screening for diabetes mellitus  Z13.1       8. Elevated liver enzymes  R74.8 Comprehensive metabolic panel (BMP + Alb, Alk Phos, ALT, AST, Total. Bili, TP)     Hepatitis C Screen Reflex to HCV RNA Quant and Genotype     Comprehensive metabolic panel (BMP + Alb, Alk Phos, ALT, AST, Total. Bili, TP)     Hepatitis C Screen Reflex to HCV RNA Quant and Genotype      9. Hx of hepatitis C  Z86.19       10. Osteopenia of multiple sites  M85.89 DX Hip/Pelvis/Spine     Vitamin D Deficiency     Vitamin D Deficiency      11. Vitamin D deficiency  E55.9 Vitamin D Deficiency     Vitamin D Deficiency      12. Alcohol dependence with unspecified alcohol-induced disorder (H)  F10.29       13. Screening for metabolic disorder  Z13.228 Hemoglobin A1c     Hemoglobin A1c      14. Encounter for screening for other viral diseases  Z11.59 Hepatitis C Screen Reflex to HCV RNA Quant and Genotype     Hepatitis C Screen Reflex to HCV RNA Quant and Genotype      15. Tobacco dependence  F17.200         #1 hypertension  Blood pressure well controlled at 122/60 today.  We will continue amlodipine as prescribed.  She is to continue to stay active.  Watch her salt and alcohol intake.    #2 hyperlipidemia  We will check a lipid panel today.  Diet and exercise discussed.  She is not currently on any lipid-lowering medication at this time.    #3 COPD  Continue  with current inhalers.  She feels that her breathing is stable at this time.    #4 anxiety  #5 insomnia  Continue with hydroxyzine as needed    #6 hypothyroidism  She is not currently on thyroid replacement.  Last thyroid checked January 2020 was stable at 2.25.  We will recheck a TSH and a free T4.     #7 osteopenia  Last DEXA scan 1/18 T score at that time was -1.7 in the lumbar spine.  I did recommend starting on calcium and vitamin D supplements.  We will also get her set up for a DEXA scan    #8 history of hepatitis C  History of IV drug use.  Has been also diagnosed with hepatitis C status post liver transplant.  She has been treated twice in the past.  Repeat labs have been negative and she has cleared hepatitis C with treatment.  Her last liver function 4/3/2022 did show an AST of 53 and ALT of 53.  She does have a drinking history but she has since sustained from all alcohol use in the last year.  We will recheck hepatitis screening with reflex to RNA viral load.  We will also check a complete metabolic panel    #9 alcohol use dependence  In remission at this time.  She sustained from all alcohol use recently.  She has had elevated liver enzymes in the past.  We will recheck these today.    #10 smoking dependence  Current smoker.  She is working on cutting back and will be starting the patch soon.    #11 breast cancer screening  She is due for mammogram 1123.    #12 colon cancer screening  She is due for colonoscopy 3/28    #13 mechanical fall  #14 left hip/thigh pain  She did fall in her yard about a week ago while sitting the bird feeder.  She landed on her left side.  She does have bruising to her left hip and to the left buttock region.  It is mildly tender but this is improving.  She is having pain in the groin region as well on the right side.  No true hip pain on exam today.  Do not noticed any evidence of hematoma or any NSAID evidence of infection.  I did offer to do x-ray and imaging but she would  like to hold off and monitor it for now.  If she does not have any improvement in her pain or she develops any signs of infection over the bruising areas she is to follow-up.  Patient agreed to this plan    #15 screening for diabetes  We will check an A1c today      MIGUEL [facility] CATIA Sheffield is a 68 year old, presenting for the following health issues:  Recheck Medication (Medication management and refills on medication. Pt reports she would like to be checked for diabetes. Currently experiencing dry mouth, frequent urination, weight gain. Also mentioned shed like to have her liver checked)        5/10/2023    12:26 PM   Additional Questions   Roomed by Rosalind Perez   Accompanied by none     Nettie is a pleasant 68-year-old male who presents to the clinic today for chronic disease management follow-up.  Past medical history significant for hypertension, hyperlipidemia, COPD, hypothyroidism, anxiety, insomnia, osteopenia, history of hepatitis C, and alcohol use disorder in remission.    She was diagnosed with hepatitis C and was on treatment twice.  Her last treatment was in 2019.  Her RNA viral load was quite elevated in the 6000's at that time.  She did have labs a year ago 4/3/2022 and her liver enzymes were mildly elevated at that time.  Previously when her labs were checked and she was positive again for a hepatitis C her liver function was within normal range.    She also has a history of hypothyroidism but is not currently on replacement.  Her last TSH level was 2.25 in 2020 off of therapy.  She would like to recheck this.    She is currently using Ellipta and albuterol as needed for COPD.  She has been needing her albuterol little more frequently as this is a difficult time for her allergies.  Overall she feels that her breathing is stable.    She has a history of osteoporosis.  Last DEXA scan was in 2018.  At that time her T score was -1.7 in the  "lumbar spine region.  She is not currently on vitamin D or calcium supplements.  She also has a history of vitamin D deficiency.    Insomnia and underlying anxiety.  She is using hydroxyzine as needed which has been helping.    Currently on amlodipine for hypertension management.    She has had an elevated lipid panel in the past.  Not currently on cholesterol-lowering medications.    She is a current smoker.  She is trying to work on cutting back.  She will be starting the patches this week.  I did congratulate her on cutting back.      Healthy Habits:     In general, how would you rate your overall health?  Good    Frequency of exercise:  2-3 days/week    Duration of exercise:  30-45 minutes    Do you usually eat at least 4 servings of fruit and vegetables a day, include whole grains    & fiber and avoid regularly eating high fat or \"junk\" foods?  Yes    Taking medications regularly:  Yes    Medication side effects:  None    Ability to successfully perform activities of daily living:  No assistance needed    Home Safety:  No safety concerns identified    Hearing Impairment:  Difficulty following a conversation in a noisy restaurant or crowded room and difficulty understanding soft or whispered speech    In the past 6 months, have you been bothered by leaking of urine?  No    In general, how would you rate your overall mental or emotional health?  Excellent      PHQ-2 Total Score: 0         Review of Systems   Constitutional: Negative for chills and fever.   HENT: Positive for congestion and hearing loss. Negative for ear pain and sore throat.    Eyes: Negative for pain and visual disturbance.   Respiratory: Positive for cough and shortness of breath. Negative for chest tightness.    Cardiovascular: Negative for chest pain, palpitations and peripheral edema.   Gastrointestinal: Negative for abdominal pain, constipation, diarrhea, heartburn, hematochezia and nausea.   Breasts:  Negative for tenderness, breast mass " "and discharge.   Genitourinary: Positive for urgency. Negative for dysuria, frequency, genital sores, hematuria, pelvic pain, vaginal bleeding and vaginal discharge.   Musculoskeletal: Negative for arthralgias, joint swelling and myalgias.        Left hip/thigh pain. Fell 1 weeks ago.  Bruising to the area. Groin pain.   Skin: Negative.    Neurological: Negative for dizziness, weakness, headaches and paresthesias.   Psychiatric/Behavioral: Negative for mood changes. The patient is not nervous/anxious.             Objective    /60 (BP Location: Left arm, Patient Position: Sitting, Cuff Size: Adult Regular)   Pulse 84   Temp 98.3  F (36.8  C) (Oral)   Resp 12   Ht 1.575 m (5' 2\")   Wt 65.3 kg (144 lb)   SpO2 98%   BMI 26.34 kg/m    Body mass index is 26.34 kg/m .  Physical Exam  Vitals and nursing note reviewed.   Constitutional:       Appearance: Normal appearance.   HENT:      Head: Normocephalic and atraumatic.   Eyes:      Conjunctiva/sclera: Conjunctivae normal.   Cardiovascular:      Rate and Rhythm: Normal rate and regular rhythm.      Heart sounds: No murmur heard.     No friction rub. No gallop.   Pulmonary:      Breath sounds: No wheezing, rhonchi or rales.   Skin:     General: Skin is warm and dry.      Comments: Ecchymosis to the left hip and upper thigh.  No evidence of warmth or hematoma noted.  Mildly tender with palpation.  No bony tenderness with palpation to the hips bilaterally.  No weakness to the lower extremity.   Neurological:      General: No focal deficit present.      Mental Status: She is alert. Mental status is at baseline.      Motor: No weakness.   Psychiatric:         Mood and Affect: Mood normal.         Behavior: Behavior normal.         Thought Content: Thought content normal.         Judgment: Judgment normal.             Answers for HPI/ROS submitted by the patient on 5/10/2023  PHQ9 TOTAL SCORE: 5      "

## 2023-05-11 LAB
DEPRECATED CALCIDIOL+CALCIFEROL SERPL-MC: 22 UG/L (ref 20–75)
HCV AB SERPL QL IA: REACTIVE

## 2023-05-15 LAB — HCV RNA SERPL NAA+PROBE-ACNC: NOT DETECTED IU/ML

## 2023-05-17 RX ORDER — ATORVASTATIN CALCIUM 20 MG/1
10 TABLET, FILM COATED ORAL DAILY
Qty: 90 TABLET | Refills: 3 | Status: SHIPPED | OUTPATIENT
Start: 2023-05-17 | End: 2024-03-15

## 2023-05-31 ENCOUNTER — TELEPHONE (OUTPATIENT)
Dept: FAMILY MEDICINE | Facility: CLINIC | Age: 68
End: 2023-05-31
Payer: MEDICARE

## 2023-05-31 DIAGNOSIS — I10 BENIGN ESSENTIAL HYPERTENSION: ICD-10-CM

## 2023-05-31 RX ORDER — AMLODIPINE BESYLATE 5 MG/1
5 TABLET ORAL DAILY
Qty: 30 TABLET | Refills: 0 | Status: SHIPPED | OUTPATIENT
Start: 2023-05-31 | End: 2023-09-14

## 2023-05-31 NOTE — TELEPHONE ENCOUNTER
Order/Referral Request    Who is requesting: Amlodipine 5mg daily    Orders being requested: 30 day supply. Mail order supply hasn't been shipped yet.     Reason service is needed/diagnosis: out of medication. Needs short term, bridging, to [facility]    When are orders needed by: ASAP    Has this been discussed with Provider: No    Does patient have an appointment scheduled?: No    Where to send orders: Fax to pharmacy    Okay to leave a detailed message?: not needed.

## 2023-06-12 ENCOUNTER — VIRTUAL VISIT (OUTPATIENT)
Dept: PSYCHOLOGY | Facility: CLINIC | Age: 68
End: 2023-06-12
Payer: MEDICARE

## 2023-06-12 DIAGNOSIS — F41.9 ANXIETY: Primary | ICD-10-CM

## 2023-06-12 NOTE — PROGRESS NOTES
"This provider called client twice.  She answered both times.  The first time was interrupted due to the loss of internet service.  The second time, after provider introduced himself as \"Sameer from [facility]\" and we were scheduled to meet at 1:00pm, the client seemed as if she did not know about any scheduled session.  This provider then asked the client to confirm her birth date in order to share more specifics about the call and she responded, \"I don't do that over the phone and I'm currently (at the hospital with my ).\"  This provider then stated if she wanted to reschedule she could call intake; however, it is not known if the client heard this as connection was lost (NOT due to internet issues).  No billing for this time.      Shadi Oden PsyD, LP  June 12, 2023  "

## 2023-09-14 ENCOUNTER — OFFICE VISIT (OUTPATIENT)
Dept: FAMILY MEDICINE | Facility: CLINIC | Age: 68
End: 2023-09-14
Payer: MEDICARE

## 2023-09-14 VITALS
RESPIRATION RATE: 16 BRPM | WEIGHT: 138 LBS | BODY MASS INDEX: 24.45 KG/M2 | TEMPERATURE: 98.7 F | DIASTOLIC BLOOD PRESSURE: 82 MMHG | OXYGEN SATURATION: 96 % | HEIGHT: 63 IN | HEART RATE: 68 BPM | SYSTOLIC BLOOD PRESSURE: 124 MMHG

## 2023-09-14 DIAGNOSIS — Z00.00 ENCOUNTER FOR MEDICARE ANNUAL WELLNESS EXAM: Primary | ICD-10-CM

## 2023-09-14 DIAGNOSIS — L72.3 SEBACEOUS CYST: ICD-10-CM

## 2023-09-14 DIAGNOSIS — B02.9 HERPES ZOSTER WITHOUT COMPLICATION: ICD-10-CM

## 2023-09-14 DIAGNOSIS — Z12.31 VISIT FOR SCREENING MAMMOGRAM: ICD-10-CM

## 2023-09-14 DIAGNOSIS — I10 BENIGN ESSENTIAL HYPERTENSION: ICD-10-CM

## 2023-09-14 DIAGNOSIS — R07.89 ATYPICAL CHEST PAIN: ICD-10-CM

## 2023-09-14 DIAGNOSIS — E78.2 MIXED HYPERLIPIDEMIA: Chronic | ICD-10-CM

## 2023-09-14 DIAGNOSIS — J44.9 CHRONIC OBSTRUCTIVE PULMONARY DISEASE, UNSPECIFIED COPD TYPE (H): ICD-10-CM

## 2023-09-14 DIAGNOSIS — Z78.0 MENOPAUSE: ICD-10-CM

## 2023-09-14 PROCEDURE — G0438 PPPS, INITIAL VISIT: HCPCS | Performed by: FAMILY MEDICINE

## 2023-09-14 PROCEDURE — 99214 OFFICE O/P EST MOD 30 MIN: CPT | Mod: 25 | Performed by: FAMILY MEDICINE

## 2023-09-14 RX ORDER — FLUTICASONE PROPIONATE 50 MCG
2 SPRAY, SUSPENSION (ML) NASAL DAILY
Qty: 16 G | Refills: 4 | Status: CANCELLED | OUTPATIENT
Start: 2023-09-14

## 2023-09-14 RX ORDER — VALACYCLOVIR HYDROCHLORIDE 1 G/1
1000 TABLET, FILM COATED ORAL 3 TIMES DAILY
Qty: 21 TABLET | Refills: 0 | Status: SHIPPED | OUTPATIENT
Start: 2023-09-14 | End: 2024-03-15

## 2023-09-14 RX ORDER — VALACYCLOVIR HYDROCHLORIDE 1 G/1
1000 TABLET, FILM COATED ORAL 3 TIMES DAILY
Qty: 21 TABLET | Refills: 0 | Status: SHIPPED | OUTPATIENT
Start: 2023-09-14 | End: 2023-09-14

## 2023-09-14 RX ORDER — AMLODIPINE BESYLATE 5 MG/1
5 TABLET ORAL DAILY
Qty: 90 TABLET | Refills: 3 | Status: SHIPPED | OUTPATIENT
Start: 2023-09-14 | End: 2024-03-15

## 2023-09-14 RX ORDER — ALBUTEROL SULFATE 90 UG/1
AEROSOL, METERED RESPIRATORY (INHALATION)
Qty: 18 G | Refills: 11 | Status: CANCELLED | OUTPATIENT
Start: 2023-09-14

## 2023-09-14 RX ORDER — ALBUTEROL SULFATE 0.83 MG/ML
2.5 SOLUTION RESPIRATORY (INHALATION) EVERY 4 HOURS PRN
Qty: 75 ML | Refills: 3 | Status: SHIPPED | OUTPATIENT
Start: 2023-09-14 | End: 2024-09-16

## 2023-09-14 ASSESSMENT — ENCOUNTER SYMPTOMS
MYALGIAS: 1
WEAKNESS: 1
HEMATOCHEZIA: 1
SHORTNESS OF BREATH: 1
HEADACHES: 1
PALPITATIONS: 1
NERVOUS/ANXIOUS: 0
ABDOMINAL PAIN: 1
BREAST MASS: 0
FEVER: 0
DIZZINESS: 1
EYE PAIN: 0
COUGH: 1
DIARRHEA: 0
ARTHRALGIAS: 1

## 2023-09-14 ASSESSMENT — PATIENT HEALTH QUESTIONNAIRE - PHQ9
10. IF YOU CHECKED OFF ANY PROBLEMS, HOW DIFFICULT HAVE THESE PROBLEMS MADE IT FOR YOU TO DO YOUR WORK, TAKE CARE OF THINGS AT HOME, OR GET ALONG WITH OTHER PEOPLE: SOMEWHAT DIFFICULT
SUM OF ALL RESPONSES TO PHQ QUESTIONS 1-9: 8
SUM OF ALL RESPONSES TO PHQ QUESTIONS 1-9: 8

## 2023-09-14 ASSESSMENT — ACTIVITIES OF DAILY LIVING (ADL): CURRENT_FUNCTION: NO ASSISTANCE NEEDED

## 2023-09-14 NOTE — PROGRESS NOTES
"The patient was provided with suggestions to help her develop a healthy physical lifestyle.  The patient was provided with written information regarding signs of hearing loss.  The patient's PHQ-9 score is consistent with mild depression. She was provided with information regarding depression and was advised to schedule a follow up appointment in 2 weeks to further address this issue.Answers submitted by the patient for this visit:  Patient Health Questionnaire (Submitted on 9/14/2023)  If you checked off any problems, how difficult have these problems made it for you to do your work, take care of things at home, or get along with other people?: Somewhat difficult  PHQ9 TOTAL SCORE: 8  Annual Preventive Visit (Submitted on 9/14/2023)  Chief Complaint: Annual Exam:  In general, how would you rate your overall physical health?: fair  Frequency of exercise:: 4-5 days/week  Do you usually eat at least 4 servings of fruit and vegetables a day, include whole grains & fiber, and avoid regularly eating high fat or \"junk\" foods? : Yes  Taking medications regularly:: Yes  Medication side effects:: None  Activities of Daily Living: no assistance needed  Home safety: no safety concerns identified  Hearing Impairment:: need to ask people to speak up or repeat themselves, difficulty understanding soft or whispered speech  In the past 6 months, have you been bothered by leaking of urine?: No  abdominal pain: Yes  Blood in stool: Yes  chest pain: Yes  congestion: Yes  cough: Yes  diarrhea: No  dizziness: Yes  ear pain: No  eye pain: No  nervous/anxious: No  fever: No  genital sores: Yes  headaches: Yes  hearing loss: Yes  arthralgias: Yes  mood changes: Yes  myalgias: Yes  palpitations: Yes  rash: Yes  shortness of breath: Yes  weakness: Yes  pelvic pain: Yes  vaginal bleeding: No  vaginal discharge: No  tenderness: No  breast mass: No  breast discharge: No  In general, how would you rate your overall mental or emotional health?: " good  Exercise outside of work (Submitted on 9/14/2023)  Chief Complaint: Annual Exam:  Duration of exercise:: Other

## 2023-09-14 NOTE — PATIENT INSTRUCTIONS
Patient Education   Personalized Prevention Plan  You are due for the preventive services outlined below.  Your care team is available to assist you in scheduling these services.  If you have already completed any of these items, please share that information with your care team to update in your medical record.  Health Maintenance Due   Topic Date Due     Breathing Capacity Test  Never done     COPD Action Plan  Never done     Depression Action Plan  Never done     Zoster (Shingles) Vaccine (1 of 2) Never done     LUNG CANCER SCREENING  07/24/2017     Diptheria Tetanus Pertussis (DTAP/TDAP/TD) Vaccine (2 - Td or Tdap) 05/04/2021     COVID-19 Vaccine (2 - Pfizer series) 12/22/2021     Annual Wellness Visit  02/10/2022     Flu Vaccine (1) 09/01/2023     Your Health Risk Assessment indicates you feel you are not in good health    A healthy lifestyle helps keep the body fit and the mind alert. It helps protect you from disease, helps you fight disease, and helps prevent chronic disease (disease that doesn't go away) from getting worse. This is important as you get older and begin to notice twinges in muscles and joints and a decline in the strength and stamina you once took for granted. A healthy lifestyle includes good healthcare, good nutrition, weight control, recreation, and regular exercise. Avoid harmful substances and do what you can to keep safe. Another part of a healthy lifestyle is stay mentally active and socially involved.    Good healthcare   Have a wellness visit every year.   If you have new symptoms, let us know right away. Don't wait until the next checkup.   Take medicines exactly as prescribed and keep your medicines in a safe place. Tell us if your medicine causes problems.   Healthy diet and weight control   Eat 3 or 4 small, nutritious, low-fat, high-fiber meals a day. Include a variety of fruits, vegetables, and whole-grain foods.   Make sure you get enough calcium in your diet. Calcium, vitamin  D, and exercise help prevent osteoporosis (bone thinning).   If you live alone, try eating with others when you can. That way you get a good meal and have company while you eat it.   Try to keep a healthy weight. If you eat more calories than your body uses for energy, it will be stored as fat and you will gain weight.     Recreation   Recreation is not limited to sports and team events. It includes any activity that provides relaxation, interest, enjoyment, and exercise. Recreation provides an outlet for physical, mental, and social energy. It can give a sense of worth and achievement. It can help you stay healthy.    Mental Exercise and Social Involvement  Mental and emotional health is as important as physical health. Keep in touch with friends and family. Stay as active as possible. Continue to learn and challenge yourself.   Things you can do to stay mentally active are:  Learn something new, like a foreign language or musical instrument.   Play SCRABBLE or do crossword puzzles. If you cannot find people to play these games with you at home, you can play them with others on your computer through the Internet.   Join a games club--anything from card games to chess or checkers or lawn bowling.   Start a new hobby.   Go back to school.   Volunteer.   Read.   Keep up with world events.  Hearing Loss: Care Instructions  Overview     Hearing loss is a sudden or slow decrease in how well you hear. It can range from slight to profound. Permanent hearing loss can occur with aging. It also can happen when you are exposed long-term to loud noise. Examples include listening to loud music, riding motorcycles, or being around other loud machines.  Hearing loss can affect your work and home life. It can make you feel lonely or depressed. You may feel that you have lost your independence. But hearing aids and other devices can help you hear better and feel connected to others.  Follow-up care is a key part of your treatment  and safety. Be sure to make and go to all appointments, and call your doctor if you are having problems. It's also a good idea to know your test results and keep a list of the medicines you take.  How can you care for yourself at home?  Avoid loud noises whenever possible. This helps keep your hearing from getting worse.  Always wear hearing protection around loud noises.  Wear a hearing aid as directed.  A professional can help you pick a hearing aid that will work best for you.  You can also get hearing aids over the counter for mild to moderate hearing loss.  Have hearing tests as your doctor suggests. They can show whether your hearing has changed. Your hearing aid may need to be adjusted.  Use other devices as needed. These may include:  Telephone amplifiers and hearing aids that can connect to a television, stereo, radio, or microphone.  Devices that use lights or vibrations. These alert you to the doorbell, a ringing telephone, or a baby monitor.  Television closed-captioning. This shows the words at the bottom of the screen. Most new TVs can do this.  TTY (text telephone). This lets you type messages back and forth on the telephone instead of talking or listening. These devices are also called TDD. When messages are typed on the keyboard, they are sent over the phone line to a receiving TTY. The message is shown on a monitor.  Use text messaging, social media, and email if it is hard for you to communicate by telephone.  Try to learn a listening technique called speechreading. It is not lipreading. You pay attention to people's gestures, expressions, posture, and tone of voice. These clues can help you understand what a person is saying. Face the person you are talking to, and have them face you. Make sure the lighting is good. You need to see the other person's face clearly.  Think about counseling if you need help to adjust to your hearing loss.  When should you call for help?  Watch closely for changes in  "your health, and be sure to contact your doctor if:    You think your hearing is getting worse.     You have new symptoms, such as dizziness or nausea.   Where can you learn more?  Go to https://www.[employer].net/patiented  Enter R798 in the search box to learn more about \"Hearing Loss: Care Instructions.\"  Current as of: March 1, 2023               Content Version: 13.7    4810-3025 [employer].   Care instructions adapted under license by your healthcare professional. If you have questions about a medical condition or this instruction, always ask your healthcare professional. [employer] disclaims any warranty or liability for your use of this information.      Learning About Depression Screening  What is depression screening?  Depression screening is a way to see if you have depression symptoms. It may be done by a doctor or counselor. It's often part of a routine checkup. That's because your mental health is just as important as your physical health.  Depression is a mental health condition that affects how you feel, think, and act. You may:  Have less energy.  Lose interest in your daily activities.  Feel sad and grouchy for a long time.  Depression is very common. It affects people of all ages.  Many things can lead to depression. Some people become depressed after they have a stroke or find out they have a major illness like cancer or heart disease. The death of a loved one or a breakup may lead to depression. It can run in families. Most experts believe that a combination of inherited genes and stressful life events can cause it.  What happens during screening?  You may be asked to fill out a form about your depression symptoms. You and the doctor will discuss your answers. The doctor may ask you more questions to learn more about how you think, act, and feel.  What happens after screening?  If you have symptoms of depression, your doctor will talk to you about your " "options.  Doctors usually treat depression with medicines or counseling. Often, combining the two works best. Many people don't get help because they think that they'll get over the depression on their own. But people with depression may not get better unless they get treatment.  The cause of depression is not well understood. There may be many factors involved. But if you have depression, it's not your fault.  A serious symptom of depression is thinking about death or suicide. If you or someone you care about talks about this or about feeling hopeless, get help right away.  It's important to know that depression can be treated. Medicine, counseling, and self-care may help.  Where can you learn more?  Go to https://www.[employer].net/patiented  Enter T185 in the search box to learn more about \"Learning About Depression Screening.\"  Current as of: October 20, 2022               Content Version: 13.7    9939-0297 [employer].   Care instructions adapted under license by your healthcare professional. If you have questions about a medical condition or this instruction, always ask your healthcare professional. [employer], [employer] disclaims any warranty or liability for your use of this information.         "

## 2023-09-14 NOTE — PROGRESS NOTES
"SUBJECTIVE:   Nettie is a 68 year old who presents for Preventive Visit.      9/14/2023    12:16 PM   Additional Questions   Roomed by Gen PUTNAM, CMA       Are you in the first 12 months of your Medicare coverage?  No    Healthy Habits:     In general, how would you rate your overall health?  Fair    Frequency of exercise:  4-5 days/week    Duration of exercise:  Other    Do you usually eat at least 4 servings of fruit and vegetables a day, include whole grains    & fiber and avoid regularly eating high fat or \"junk\" foods?  Yes    Taking medications regularly:  Yes    Medication side effects:  None    Ability to successfully perform activities of daily living:  No assistance needed    Home Safety:  No safety concerns identified    Hearing Impairment:  Need to ask people to speak up or repeat themselves and difficulty understanding soft or whispered speech    In the past 6 months, have you been bothered by leaking of urine?  No    In general, how would you rate your overall mental or emotional health?  Good        Have you ever done Advance Care Planning? (For example, a Health Directive, POLST, or a discussion with a medical provider or your loved ones about your wishes): No, advance care planning information given to patient to review.  Advanced care planning was discussed at today's visit.       Fall risk  Fallen 2 or more times in the past year?: No  Any fall with injury in the past year?: No    Cognitive Screening   1) Repeat 3 items (Leader, Season, Table)    2) Clock draw: NORMAL  3) 3 item recall: Recalls 2 objects   Results: NORMAL clock, 1-2 items recalled: COGNITIVE IMPAIRMENT LESS LIKELY    Mini-CogTM Copyright CHUY Maguire. Licensed by the author for use in [facility]; reprinted with permission (venus@.[facility]). All rights reserved.      Do you have sleep apnea, excessive snoring or daytime drowsiness? : no    Reviewed and updated as needed this visit by clinical staff   Tobacco  Allergies  Meds  " Problems  Med Hx  Surg Hx  Fam Hx          Reviewed and updated as needed this visit by Provider   Tobacco  Allergies  Meds  Problems  Med Hx  Surg Hx  Fam Hx         Social History     Tobacco Use    Smoking status: Every Day     Packs/day: 0.25     Years: 5.00     Pack years: 1.25     Types: Cigarettes     Passive exposure: Current    Smokeless tobacco: Never    Tobacco comments:     using patches in between; given smokiing cessation information   Substance Use Topics    Alcohol use: No     Comment: Alcoholic Drinks/day: 1 a year             9/14/2023    12:12 PM   Alcohol Use   Prescreen: >3 drinks/day or >7 drinks/week? Not Applicable     Do you have a current opioid prescription? No  Do you use any other controlled substances or medications that are not prescribed by a provider? None          Hyperlipidemia Follow-Up    Are you regularly taking any medication or supplement to lower your cholesterol?   Yes- lipitor  Are you having muscle aches or other side effects that you think could be caused by your cholesterol lowering medication?  No    Hypertension Follow-up    Do you check your blood pressure regularly outside of the clinic? No   Are you following a low salt diet? Yes  Are your blood pressures ever more than 140 on the top number (systolic) OR more   than 90 on the bottom number (diastolic), for example 140/90? No    COPD Follow-Up  Overall, how are your COPD symptoms since your last clinic visit?  No change  How much fatigue or shortness of breath do you have when you are walking?  Same as usual  How much shortness of breath do you have when you are resting?  Same as usual  How often do you cough? Sometimes  Have you noticed any change in your sputum/phlegm?  No  Have you experienced a recent fever? No  Please describe how far you can walk without stopping to rest:  Less than 1 block  How many flights of stairs are you able to walk up without stopping?  1  Have you had any Emergency Room  Visits, Urgent Care Visits, or Hospital Admissions because of your COPD since your last office visit?  No    History   Smoking Status    Every Day    Packs/day: 0.25    Years: 5.00    Types: Cigarettes   Smokeless Tobacco    Never     No results found for: FEV1, AUC7HCO    Current providers sharing in care for this patient include:   Patient Care Team:  Carina Locke MD as PCP - General (Family Medicine)  Justin Alvares PA-C as Assigned PCP  Shadi Oden PsyD as Assigned Behavioral Health Provider    The following health maintenance items are reviewed in Epic and correct as of today:  Health Maintenance   Topic Date Due    SPIROMETRY  Never done    ADVANCE CARE PLANNING  Never done    COPD ACTION PLAN  Never done    DEPRESSION ACTION PLAN  Never done    ZOSTER IMMUNIZATION (1 of 2) Never done    LUNG CANCER SCREENING  07/24/2017    DTAP/TDAP/TD IMMUNIZATION (2 - Td or Tdap) 05/04/2021    COVID-19 Vaccine (2 - Pfizer series) 12/22/2021    MEDICARE ANNUAL WELLNESS VISIT  02/10/2022    INFLUENZA VACCINE (1) 09/01/2023    MAMMO SCREENING  11/09/2023    NICOTINE/TOBACCO CESSATION COUNSELING Q 1 YR  12/14/2023    ANNUAL REVIEW OF HM ORDERS  12/14/2023    PHQ-9  03/14/2024    FALL RISK ASSESSMENT  09/14/2024    Pneumococcal Vaccine: 65+ Years (3 - PPSV23 or PCV20) 10/26/2025    COLORECTAL CANCER SCREENING  03/09/2028    LIPID  05/10/2028    DEXA  01/08/2033    IPV IMMUNIZATION  Aged Out    HPV IMMUNIZATION  Aged Out    MENINGITIS IMMUNIZATION  Aged Out    PAP  Discontinued     Lab work is in process  Labs reviewed in EPIC      FHS-7:       11/9/2021     2:26 PM 5/10/2023    12:32 PM 9/14/2023    12:13 PM   Breast CA Risk Assessment (FHS-7)   Did any of your first-degree relatives have breast or ovarian cancer? Yes Yes Yes   Did any of your relatives have bilateral breast cancer? No Yes Yes   Did any man in your family have breast cancer? No No No   Did any woman in your family have breast and ovarian cancer? No  "No Yes   Did any woman in your family have breast cancer before age 50 y? No No No   Do you have 2 or more relatives with breast and/or ovarian cancer? No Yes No   Do you have 2 or more relatives with breast and/or bowel cancer? No Yes No       Mammogram Screening: Recommended mammography every 1-2 years with patient discussion and risk factor consideration  Pertinent mammograms are reviewed under the imaging tab.    Review of Systems   Constitutional:  Negative for fever.   HENT:  Positive for congestion and hearing loss. Negative for ear pain.    Eyes:  Negative for pain.   Respiratory:  Positive for cough and shortness of breath.    Cardiovascular:  Positive for chest pain and palpitations.   Gastrointestinal:  Positive for abdominal pain and hematochezia. Negative for diarrhea.   Breasts:  Negative for tenderness, breast mass and discharge.   Genitourinary:  Positive for genital sores and pelvic pain. Negative for vaginal bleeding and vaginal discharge.   Musculoskeletal:  Positive for arthralgias and myalgias.   Skin:  Positive for rash.   Neurological:  Positive for dizziness, weakness and headaches.   Psychiatric/Behavioral:  Positive for mood changes. The patient is not nervous/anxious.          OBJECTIVE:   /82   Pulse 68   Temp 98.7  F (37.1  C) (Oral)   Resp 16   Ht 1.588 m (5' 2.5\")   Wt 62.6 kg (138 lb)   LMP  (LMP Unknown)   SpO2 96%   BMI 24.84 kg/m   Estimated body mass index is 24.84 kg/m  as calculated from the following:    Height as of this encounter: 1.588 m (5' 2.5\").    Weight as of this encounter: 62.6 kg (138 lb).  Physical Exam  GENERAL APPEARANCE: healthy, alert and no distress  EYES: Eyes grossly normal to inspection, PERRL and conjunctivae and sclerae normal  HENT: ear canals and TM's normal, nose and mouth without ulcers or lesions, oropharynx clear and oral mucous membranes moist  NECK: no adenopathy, no asymmetry, masses, or scars and thyroid normal to palpation  RESP: " lungs clear to auscultation - no rales, rhonchi or wheezes  BREAST: normal without masses, tenderness or nipple discharge and no palpable axillary masses or adenopathy  CV: regular rate and rhythm, normal S1 S2, no S3 or S4, no murmur, click or rub, no peripheral edema and peripheral pulses strong  ABDOMEN: soft, nontender, no hepatosplenomegaly, no masses and bowel sounds normal  MS: no musculoskeletal defects are noted and gait is age appropriate without ataxia  SKIN: a group of erythema rash on left low back, no rash cross the middle line. A lump on back of neck 1.2 cm, no tenderness.   NEURO: Normal strength and tone, sensory exam grossly normal, mentation intact and speech normal  PSYCH: mentation appears normal and affect normal/bright    Diagnostic Test Results:  Labs reviewed in Epic    ASSESSMENT / PLAN:   (Z00.00) Encounter for Medicare annual wellness exam  (primary encounter diagnosis)  Comment: encourage annual wellness visit and vaccine up date  Plan: pt prefer to do vaccine at pharmacy due to cost.     (I10) Benign essential hypertension  Comment: bp reasonable control. She takes medication as instructed and dose not check bp at home.   Plan: amLODIPine (NORVASC) 5 MG tablet        Continue current medication and check bp at home regularly. If bp is < 100/60 she will hold amlodipine.     (E78.2) Mixed hyperlipidemia  Comment: doing well with medication. We reviewed the lipid at 5/2023.   Plan: continue current medication. Advise regular exercise and low fat diet.     (J44.9) Chronic obstructive pulmonary disease, unspecified COPD type (H)  Comment: pt uses all her inhalers as instructed. She is smoker with 1.25 pack years. She is working with [employer] to quit smoke. Denies cough, wheezing, sob, fever, weight loss.   Plan: Spirometry, Breathing Capacity, Normal Order,         Clinic Performed, umeclidinium (INCRUSE         ELLIPTA) 62.5 MCG/ACT inhaler, albuterol         (PROVENTIL) (2.5 MG/3ML) 0.083%  neb solution        Continue current inhalers. Strongly advise to quit smoke.     (R07.89) Atypical chest pain  Comment: pt has chest pain comes and goes for years. No chest pain now. At upper middle chest, no radiation. Nothing trigger it. No pain when she dose exercise. Sometime she takes aspirin that help with the pain.  She has stress test at 6/2021 and not remarkable. She has risk of smoker, htn, hypercholesterolia. Due to her shingle on back we did not do EKG. Exam is not remarkable. Likely atypical cp.   Plan: Adult Cardiology Eval  Referral        Will have consult with cardiologist.     (Z12.31) Visit for screening mammogram  Comment:   Plan: *MA Screening Digital Bilateral            (Z78.0) Menopause  Comment:   Plan: DX Hip/Pelvis/Spine            (L72.3) Sebaceous cyst  Comment: pt has a lump on back of neck for years. She wants to remove it.   Plan: Adult Dermatology Referral            (B02.9) Herpes zoster without complication  Comment: pt has painful rash on right low back for 2-3 days. No fever, sick contact, n/v/d/c. Exam: a patch of ruptured erythema rash on right low back. Not cross the middle line. She has shingle    Plan: valACYclovir (VALTREX) 1000 mg tablet,         DISCONTINUED: valACYclovir (VALTREX) 1000 mg         tablet        Advise to take medication. Advise to avoid contact with pregnant lady and young children who have not got chicken pox vaccine. Follow-up if pain worse.     Patient has been advised of split billing requirements and indicates understanding: Yes      COUNSELING:  Reviewed preventive health counseling, as reflected in patient instructions       Regular exercise       Healthy diet/nutrition       Vision screening       Hearing screening       Dental care       Bladder control       Fall risk prevention       Immunizations  Declined: Covid-19, Influenza, and Zoster due to Cost             Osteoporosis prevention/bone health       Consider lung cancer screening  for ages 55-80 years (77 for Medicare) and 20 pack-year smoking history           Colon cancer screening        She reports that she has been smoking cigarettes. She has a 1.25 pack-year smoking history. She has been exposed to tobacco smoke. She has never used smokeless tobacco.  Nicotine/Tobacco Cessation Plan:   Information offered: Patient not interested at this time      Appropriate preventive services were discussed with this patient, including applicable screening as appropriate for cardiovascular disease, diabetes, osteopenia/osteoporosis, and glaucoma.  As appropriate for age/gender, discussed screening for colorectal cancer, prostate cancer, breast cancer, and cervical cancer. Checklist reviewing preventive services available has been given to the patient.    Reviewed patients plan of care and provided an AVS. The Basic Care Plan (routine screening as documented in Health Maintenance) for Nettie meets the Care Plan requirement. This Care Plan has been established and reviewed with the Patient.          Carina Locke MD  [facility]    Identified Health Risks:  I have reviewed Opioid Use Disorder and Substance Use Disorder risk factors and made any needed referrals. Answers submitted by the patient for this visit:  Patient Health Questionnaire (Submitted on 9/14/2023)  If you checked off any problems, how difficult have these problems made it for you to do your work, take care of things at home, or get along with other people?: Somewhat difficult  PHQ9 TOTAL SCORE: 8

## 2023-10-02 ENCOUNTER — OFFICE VISIT (OUTPATIENT)
Dept: CARDIOLOGY | Facility: CLINIC | Age: 68
End: 2023-10-02
Attending: FAMILY MEDICINE
Payer: MEDICARE

## 2023-10-02 VITALS
OXYGEN SATURATION: 97 % | BODY MASS INDEX: 25.38 KG/M2 | SYSTOLIC BLOOD PRESSURE: 121 MMHG | DIASTOLIC BLOOD PRESSURE: 74 MMHG | HEART RATE: 67 BPM | WEIGHT: 141 LBS | RESPIRATION RATE: 16 BRPM

## 2023-10-02 DIAGNOSIS — R07.2 PRECORDIAL PAIN: Primary | ICD-10-CM

## 2023-10-02 DIAGNOSIS — E78.5 DYSLIPIDEMIA: ICD-10-CM

## 2023-10-02 DIAGNOSIS — R07.89 ATYPICAL CHEST PAIN: ICD-10-CM

## 2023-10-02 DIAGNOSIS — R06.09 DOE (DYSPNEA ON EXERTION): ICD-10-CM

## 2023-10-02 DIAGNOSIS — I10 HYPERTENSION, UNSPECIFIED TYPE: ICD-10-CM

## 2023-10-02 PROCEDURE — 99204 OFFICE O/P NEW MOD 45 MIN: CPT | Performed by: INTERNAL MEDICINE

## 2023-10-02 RX ORDER — ASPIRIN 81 MG/1
81 TABLET ORAL DAILY
Start: 2023-10-02

## 2023-10-02 NOTE — LETTER
10/2/2023    Carina Locke MD  [address]  [address]  [facility] [address]    RE: Nettie Paul       Dear Colleague,     I had the pleasure of seeing Nettie Paul in the [facility].         [facility]   [address], [place] [address]   [email or web address]   OFFICE: [phone]        Thank you Dr. Locke for asking the [facility] team to participate in the care of your patient, Nettie Paul.     Impression and Plan     1.  Chest discomfort.  Certain features of Hamzahs chest discomfort do seem somewhat consistent with exertional angina though other features are a bit atypical.  She does have a variety of risk factors for development of coronary disease (hypertension, dyslipidemia, tobacco abuse, and early onset coronary disease in family).  I do feel that further work-up is reasonable and warranted.  She indicates that she has had some unsteadiness/dizziness when using a treadmill and feels she may not be able to adequately exercise on a treadmill and therefore will pursue nontreadmill ischemic evaluation.  In addition, she had suffered prior neck and back injury which may limit her somewhat as well.  Plan:  Regadenoson stress MRI (patient states that she has had MRIs in the past and has had no issues with claustrophobia and the like).  Did advise daily aspirin at least until stress test is completed.    2.  Soft systolic murmur is heard at the right sternal border suggestive of perhaps some mild aortic stenosis.  Stress MRI as per problem #1 which will also allow for us to evaluate for any underlying valvular disease such as aortic stenosis.    3.  Hypertension.  Blood pressure is reasonable in the office today at 121/74 mmHg.    4.  Dyslipidemia.  Continue statin therapy.    6. Tobacco abuse.  Nettie states that she had quit smoking for 60 years, but subsequently resumed smoking.  She states she is intent on  quitting and has a goal set to quit this weekend.    Follow-up and further recommendations pending stress MRI results.    35 minutes spent reviewing prior records (including documentation, laboratory studies, cardiac testing/imaging), interview with patient along with physical exam, planning, and subsequent documentation/crafting of note).           History of Present Illness    Once again I would like to thank you again for asking me to participate in the care of your patient, Nettie Paul.  As you know, but to reiterate for my own records, Nettie Paul is a 68 year old female with symptoms of chest discomfort.  She reports intermittent central chest pressure.  At times it does seem to be brought on with activity though at other times is somewhat more sporadic.  She states that when she takes aspirin it seems to help.  She also reports that at times antiacids also seem effective in ameliorating her discomfort.  Symptoms are somewhat compounded by the fact that she has had some difficulty swallowing as well and has had some pain upon swallowing, too.  She has perhaps some mild associated shortness of breath.    Further review of systems is otherwise negative/noncontributory (medical record and 13 point review of systems reviewed as well and pertinent positives noted).         Cardiac Diagnostics/Imaging      Exercise nuclear perfusion study 5 February 2018:  No evidence of infarct or ischemia.  Normal left ventricular systolic performance with ejection fraction of 70%.  Average functional capacity.    Twelve-lead ECG (personally reviewed) 3 April 2022: Sinus rhythm.  Normal ECG.      Chest radiograph 3 April 2022:  Scoliosis.  The lungs are clear.  No change from previous.           Physical Examination       /74 (BP Location: Right arm, Patient Position: Sitting, Cuff Size: Adult Regular)   Pulse 67   Resp 16   Wt 64 kg (141 lb)   LMP  (LMP Unknown)   SpO2 97%   BMI 25.38 kg/m          Wt  Readings from Last 3 Encounters:   10/02/23 64 kg (141 lb)   23 62.6 kg (138 lb)   05/10/23 65.3 kg (144 lb)       The patient is alert and oriented times three. Sclerae are anicteric. Mucosal membranes are moist. Jugular venous pressure is normal. No significant adenopathy/thyromegally appreciated. Lungs are clear with good expansion. On cardiovascular exam, the patient has a regular S1 and S2.  Soft systolic murmur heard at right sternal border.  Abdomen is soft and non-tender. Extremities reveal no clubbing, cyanosis, or edema.         Family History/Social History/Risk Factors   Patient does not smoke.  Family history reviewed, and family history includes Alcoholism in her father and mother; Breast Cancer in her sister; Cirrhosis in her mother; Coronary Artery Disease in her father and sister; Hypertension in her brother; Seizure Disorder in her sister.          Medical History  Surgical History Family History Social History   Past Medical History:   Diagnosis Date    COPD (chronic obstructive pulmonary disease) (H)     Dyslexia, developmental     GERD (gastroesophageal reflux disease)     Hep C w/o coma, chronic (H)     had failed treatment, saw [facility]     (normal spontaneous vaginal delivery)      Past Surgical History:   Procedure Laterality Date    ARTHROSCOPY HIP      ARTHROSCOPY SHOULDER ROTATOR CUFF REPAIR Right     C/SECTION, LOW TRANSVERSE      ORIF FEMUR FRACTURE       Family History   Problem Relation Age of Onset    Cirrhosis Mother     Alcoholism Mother     Coronary Artery Disease Father     Alcoholism Father     Coronary Artery Disease Sister     Seizure Disorder Sister     Breast Cancer Sister     Hypertension Brother         Social History     Socioeconomic History    Marital status: Single     Spouse name: Not on file    Number of children: 3    Years of education: 10    Highest education level: Not on file   Occupational History    Not on file   Tobacco Use    Smoking status: Every Day      Packs/day: 0.25     Years: 5.00     Pack years: 1.25     Types: Cigarettes     Passive exposure: Current    Smokeless tobacco: Never    Tobacco comments:     using patches in between; given smokiing cessation information   Vaping Use    Vaping Use: Never used   Substance and Sexual Activity    Alcohol use: No     Comment: Alcoholic Drinks/day: 1 a year    Drug use: No    Sexual activity: Not Currently     Partners: Male     Birth control/protection: None   Other Topics Concern    Not on file   Social History Narrative    Not on file     Social Determinants of Health     Financial Resource Strain: Not on file   Food Insecurity: Not on file   Transportation Needs: Not on file   Physical Activity: Not on file   Stress: Not on file   Social Connections: Not on file   Interpersonal Safety: Not on file   Housing Stability: Not on file           Medications  Allergies   Current Outpatient Medications   Medication Sig Dispense Refill    albuterol (PROAIR HFA/PROVENTIL HFA/VENTOLIN HFA) 108 (90 Base) MCG/ACT inhaler INHALE 2 PUFFS BY MOUTH EVERY 6 HOURS AS NEEDED FOR WHEEZING 18 g 11    albuterol (PROVENTIL) (2.5 MG/3ML) 0.083% neb solution Take 1 vial (2.5 mg) by nebulization every 4 hours as needed for shortness of breath or wheezing 75 mL 3    amLODIPine (NORVASC) 5 MG tablet Take 1 tablet (5 mg) by mouth daily 90 tablet 3    atorvastatin (LIPITOR) 20 MG tablet Take 0.5 tablets (10 mg) by mouth daily 90 tablet 3    fluticasone (FLONASE) 50 MCG/ACT nasal spray Spray 2 sprays into both nostrils daily 16 g 4    hydrOXYzine (ATARAX) 25 MG tablet Take 1 tablet (25 mg) by mouth At Bedtime 90 tablet 3    umeclidinium (INCRUSE ELLIPTA) 62.5 MCG/ACT inhaler INHALE 1 PUFF BY MOUTH EVERY DAY - (DISCARD INHALER 6 WEEKS AFTER OPENING FOIL TRAY OR WHEN EMPTY) Strength: 62.5 MCG/INH 3 each 3    valACYclovir (VALTREX) 1000 mg tablet Take 1 tablet (1,000 mg) by mouth 3 times daily 21 tablet 0       Allergies   Allergen Reactions     "Codeine Unknown    Gabapentin Other (See Comments)     \"feels spacey, woozy, dizzy, off-balance, not clear\"    Penicillins Other (See Comments)     Pt unsure, reaction over 30 years ago, has had penicillin since           Lab Results    Chemistry/lipid CBC Cardiac Enzymes/BNP/TSH/INR   Recent Labs   Lab Test 05/10/23  1258   CHOL 207*   HDL 50   *   TRIG 98     Recent Labs   Lab Test 05/10/23  1258 12/18/17  1506 05/12/17  1229   * 156* 148*     Recent Labs   Lab Test 05/10/23  1258      POTASSIUM 3.7   CHLORIDE 105   CO2 25   *   BUN 13.2   CR 0.82   GFRESTIMATED 78   YONNY 9.3     Recent Labs   Lab Test 05/10/23  1258 04/03/22  0950 10/13/18  1350   CR 0.82 0.69 0.69     Recent Labs   Lab Test 05/10/23  1258   A1C 6.3*          Recent Labs   Lab Test 04/03/22  0950   WBC 8.3   HGB 16.8*   HCT 49.3*   MCV 89        Recent Labs   Lab Test 04/03/22  0950 05/01/19  1000 10/13/18  1255   HGB 16.8* 14.8 15.4    Recent Labs   Lab Test 04/03/22  0950   TROPONINI <0.01     No results for input(s): BNP, NTBNPI, NTBNP in the last 42450 hours.  Recent Labs   Lab Test 05/10/23  1258   TSH 2.45     No results for input(s): INR in the last 38721 hours.       Medications  Allergies   Current Outpatient Medications   Medication Sig Dispense Refill    albuterol (PROAIR HFA/PROVENTIL HFA/VENTOLIN HFA) 108 (90 Base) MCG/ACT inhaler INHALE 2 PUFFS BY MOUTH EVERY 6 HOURS AS NEEDED FOR WHEEZING 18 g 11    albuterol (PROVENTIL) (2.5 MG/3ML) 0.083% neb solution Take 1 vial (2.5 mg) by nebulization every 4 hours as needed for shortness of breath or wheezing 75 mL 3    amLODIPine (NORVASC) 5 MG tablet Take 1 tablet (5 mg) by mouth daily 90 tablet 3    atorvastatin (LIPITOR) 20 MG tablet Take 0.5 tablets (10 mg) by mouth daily 90 tablet 3    fluticasone (FLONASE) 50 MCG/ACT nasal spray Spray 2 sprays into both nostrils daily 16 g 4    hydrOXYzine (ATARAX) 25 MG tablet Take 1 tablet (25 mg) by mouth At Bedtime " "90 tablet 3    umeclidinium (INCRUSE ELLIPTA) 62.5 MCG/ACT inhaler INHALE 1 PUFF BY MOUTH EVERY DAY - (DISCARD INHALER 6 WEEKS AFTER OPENING FOIL TRAY OR WHEN EMPTY) Strength: 62.5 MCG/INH 3 each 3    valACYclovir (VALTREX) 1000 mg tablet Take 1 tablet (1,000 mg) by mouth 3 times daily 21 tablet 0      Allergies   Allergen Reactions    Codeine Unknown    Gabapentin Other (See Comments)     \"feels spacey, woozy, dizzy, off-balance, not clear\"    Penicillins Other (See Comments)     Pt unsure, reaction over 30 years ago, has had penicillin since           Lab Results   Lab Results   Component Value Date     05/10/2023    CO2 25 05/10/2023    CO2 21 04/03/2022    BUN 13.2 05/10/2023    BUN 11 04/03/2022     Lab Results   Component Value Date    WBC 8.3 04/03/2022    HGB 16.8 04/03/2022    HCT 49.3 04/03/2022    MCV 89 04/03/2022     04/03/2022     Lab Results   Component Value Date    CHOL 207 05/10/2023    TRIG 98 05/10/2023    HDL 50 05/10/2023       Lab Results   Component Value Date    TROPONINI <0.01 04/03/2022     Lab Results   Component Value Date    TSH 2.45 05/10/2023    TSH 2.25 01/10/2020                      Thank you for allowing me to participate in the care of your patient.      Sincerely,     Mara Mariee MD     [facility]  cc:   Carina Locke MD  [address]  [facility] [address]  [place] [address]      "

## 2023-10-02 NOTE — PROGRESS NOTES
[facility]   [address], [place] [address]   [email or web address]   OFFICE: [phone]        Thank you Dr. Locke for asking the [facility] team to participate in the care of your patient, Nettie Paul.     Impression and Plan     1.  Chest discomfort.  Certain features of Nettie's chest discomfort do seem somewhat consistent with exertional angina though other features are a bit atypical.  She does have a variety of risk factors for development of coronary disease (hypertension, dyslipidemia, tobacco abuse, and early onset coronary disease in family).  I do feel that further work-up is reasonable and warranted.  She indicates that she has had some unsteadiness/dizziness when using a treadmill and feels she may not be able to adequately exercise on a treadmill and therefore will pursue nontreadmill ischemic evaluation.  In addition, she had suffered prior neck and back injury which may limit her somewhat as well.  Plan:  Regadenoson stress MRI (patient states that she has had MRIs in the past and has had no issues with claustrophobia and the like).  Did advise daily aspirin at least until stress test is completed.    2.  Soft systolic murmur is heard at the right sternal border suggestive of perhaps some mild aortic stenosis.  Stress MRI as per problem #1 which will also allow for us to evaluate for any underlying valvular disease such as aortic stenosis.    3.  Hypertension.  Blood pressure is reasonable in the office today at 121/74 mmHg.    4.  Dyslipidemia.  Continue statin therapy.    6. Tobacco abuse.  Nettie states that she had quit smoking for 60 years, but subsequently resumed smoking.  She states she is intent on quitting and has a goal set to quit this weekend.    Follow-up and further recommendations pending stress MRI results.    35 minutes spent reviewing prior records (including documentation, laboratory studies, cardiac  testing/imaging), interview with patient along with physical exam, planning, and subsequent documentation/crafting of note).           History of Present Illness    Once again I would like to thank you again for asking me to participate in the care of your patient, Nettie Paul.  As you know, but to reiterate for my own records, Nettie Paul is a 68 year old female with symptoms of chest discomfort.  She reports intermittent central chest pressure.  At times it does seem to be brought on with activity though at other times is somewhat more sporadic.  She states that when she takes aspirin it seems to help.  She also reports that at times antiacids also seem effective in ameliorating her discomfort.  Symptoms are somewhat compounded by the fact that she has had some difficulty swallowing as well and has had some pain upon swallowing, too.  She has perhaps some mild associated shortness of breath.    Further review of systems is otherwise negative/noncontributory (medical record and 13 point review of systems reviewed as well and pertinent positives noted).         Cardiac Diagnostics/Imaging      Exercise nuclear perfusion study 5 February 2018:  No evidence of infarct or ischemia.  Normal left ventricular systolic performance with ejection fraction of 70%.  Average functional capacity.    Twelve-lead ECG (personally reviewed) 3 April 2022: Sinus rhythm.  Normal ECG.      Chest radiograph 3 April 2022:  Scoliosis.  The lungs are clear.  No change from previous.           Physical Examination       /74 (BP Location: Right arm, Patient Position: Sitting, Cuff Size: Adult Regular)   Pulse 67   Resp 16   Wt 64 kg (141 lb)   LMP  (LMP Unknown)   SpO2 97%   BMI 25.38 kg/m          Wt Readings from Last 3 Encounters:   10/02/23 64 kg (141 lb)   09/14/23 62.6 kg (138 lb)   05/10/23 65.3 kg (144 lb)       The patient is alert and oriented times three. Sclerae are anicteric. Mucosal membranes are moist.  Jugular venous pressure is normal. No significant adenopathy/thyromegally appreciated. Lungs are clear with good expansion. On cardiovascular exam, the patient has a regular S1 and S2.  Soft systolic murmur heard at right sternal border.  Abdomen is soft and non-tender. Extremities reveal no clubbing, cyanosis, or edema.         Family History/Social History/Risk Factors   Patient does not smoke.  Family history reviewed, and family history includes Alcoholism in her father and mother; Breast Cancer in her sister; Cirrhosis in her mother; Coronary Artery Disease in her father and sister; Hypertension in her brother; Seizure Disorder in her sister.          Medical History  Surgical History Family History Social History   Past Medical History:   Diagnosis Date    COPD (chronic obstructive pulmonary disease) (H)     Dyslexia, developmental     GERD (gastroesophageal reflux disease)     Hep C w/o coma, chronic (H)     had failed treatment, saw [facility]     (normal spontaneous vaginal delivery)      Past Surgical History:   Procedure Laterality Date    ARTHROSCOPY HIP      ARTHROSCOPY SHOULDER ROTATOR CUFF REPAIR Right     C/SECTION, LOW TRANSVERSE      ORIF FEMUR FRACTURE       Family History   Problem Relation Age of Onset    Cirrhosis Mother     Alcoholism Mother     Coronary Artery Disease Father     Alcoholism Father     Coronary Artery Disease Sister     Seizure Disorder Sister     Breast Cancer Sister     Hypertension Brother         Social History     Socioeconomic History    Marital status: Single     Spouse name: Not on file    Number of children: 3    Years of education: 10    Highest education level: Not on file   Occupational History    Not on file   Tobacco Use    Smoking status: Every Day     Packs/day: 0.25     Years: 5.00     Pack years: 1.25     Types: Cigarettes     Passive exposure: Current    Smokeless tobacco: Never    Tobacco comments:     using patches in between; given smokiing cessation  "information   Vaping Use    Vaping Use: Never used   Substance and Sexual Activity    Alcohol use: No     Comment: Alcoholic Drinks/day: 1 a year    Drug use: No    Sexual activity: Not Currently     Partners: Male     Birth control/protection: None   Other Topics Concern    Not on file   Social History Narrative    Not on file     Social Determinants of Health     Financial Resource Strain: Not on file   Food Insecurity: Not on file   Transportation Needs: Not on file   Physical Activity: Not on file   Stress: Not on file   Social Connections: Not on file   Interpersonal Safety: Not on file   Housing Stability: Not on file           Medications  Allergies   Current Outpatient Medications   Medication Sig Dispense Refill    albuterol (PROAIR HFA/PROVENTIL HFA/VENTOLIN HFA) 108 (90 Base) MCG/ACT inhaler INHALE 2 PUFFS BY MOUTH EVERY 6 HOURS AS NEEDED FOR WHEEZING 18 g 11    albuterol (PROVENTIL) (2.5 MG/3ML) 0.083% neb solution Take 1 vial (2.5 mg) by nebulization every 4 hours as needed for shortness of breath or wheezing 75 mL 3    amLODIPine (NORVASC) 5 MG tablet Take 1 tablet (5 mg) by mouth daily 90 tablet 3    atorvastatin (LIPITOR) 20 MG tablet Take 0.5 tablets (10 mg) by mouth daily 90 tablet 3    fluticasone (FLONASE) 50 MCG/ACT nasal spray Spray 2 sprays into both nostrils daily 16 g 4    hydrOXYzine (ATARAX) 25 MG tablet Take 1 tablet (25 mg) by mouth At Bedtime 90 tablet 3    umeclidinium (INCRUSE ELLIPTA) 62.5 MCG/ACT inhaler INHALE 1 PUFF BY MOUTH EVERY DAY - (DISCARD INHALER 6 WEEKS AFTER OPENING FOIL TRAY OR WHEN EMPTY) Strength: 62.5 MCG/INH 3 each 3    valACYclovir (VALTREX) 1000 mg tablet Take 1 tablet (1,000 mg) by mouth 3 times daily 21 tablet 0       Allergies   Allergen Reactions    Codeine Unknown    Gabapentin Other (See Comments)     \"feels spacey, woozy, dizzy, off-balance, not clear\"    Penicillins Other (See Comments)     Pt unsure, reaction over 30 years ago, has had penicillin since     "       Lab Results    Chemistry/lipid CBC Cardiac Enzymes/BNP/TSH/INR   Recent Labs   Lab Test 05/10/23  1258   CHOL 207*   HDL 50   *   TRIG 98     Recent Labs   Lab Test 05/10/23  1258 12/18/17  1506 05/12/17  1229   * 156* 148*     Recent Labs   Lab Test 05/10/23  1258      POTASSIUM 3.7   CHLORIDE 105   CO2 25   *   BUN 13.2   CR 0.82   GFRESTIMATED 78   YONNY 9.3     Recent Labs   Lab Test 05/10/23  1258 04/03/22  0950 10/13/18  1350   CR 0.82 0.69 0.69     Recent Labs   Lab Test 05/10/23  1258   A1C 6.3*          Recent Labs   Lab Test 04/03/22  0950   WBC 8.3   HGB 16.8*   HCT 49.3*   MCV 89        Recent Labs   Lab Test 04/03/22  0950 05/01/19  1000 10/13/18  1255   HGB 16.8* 14.8 15.4    Recent Labs   Lab Test 04/03/22  0950   TROPONINI <0.01     No results for input(s): BNP, NTBNPI, NTBNP in the last 21667 hours.  Recent Labs   Lab Test 05/10/23  1258   TSH 2.45     No results for input(s): INR in the last 72962 hours.       Medications  Allergies   Current Outpatient Medications   Medication Sig Dispense Refill    albuterol (PROAIR HFA/PROVENTIL HFA/VENTOLIN HFA) 108 (90 Base) MCG/ACT inhaler INHALE 2 PUFFS BY MOUTH EVERY 6 HOURS AS NEEDED FOR WHEEZING 18 g 11    albuterol (PROVENTIL) (2.5 MG/3ML) 0.083% neb solution Take 1 vial (2.5 mg) by nebulization every 4 hours as needed for shortness of breath or wheezing 75 mL 3    amLODIPine (NORVASC) 5 MG tablet Take 1 tablet (5 mg) by mouth daily 90 tablet 3    atorvastatin (LIPITOR) 20 MG tablet Take 0.5 tablets (10 mg) by mouth daily 90 tablet 3    fluticasone (FLONASE) 50 MCG/ACT nasal spray Spray 2 sprays into both nostrils daily 16 g 4    hydrOXYzine (ATARAX) 25 MG tablet Take 1 tablet (25 mg) by mouth At Bedtime 90 tablet 3    umeclidinium (INCRUSE ELLIPTA) 62.5 MCG/ACT inhaler INHALE 1 PUFF BY MOUTH EVERY DAY - (DISCARD INHALER 6 WEEKS AFTER OPENING FOIL TRAY OR WHEN EMPTY) Strength: 62.5 MCG/INH 3 each 3    valACYclovir  "(VALTREX) 1000 mg tablet Take 1 tablet (1,000 mg) by mouth 3 times daily 21 tablet 0      Allergies   Allergen Reactions    Codeine Unknown    Gabapentin Other (See Comments)     \"feels spacey, woozy, dizzy, off-balance, not clear\"    Penicillins Other (See Comments)     Pt unsure, reaction over 30 years ago, has had penicillin since           Lab Results   Lab Results   Component Value Date     05/10/2023    CO2 25 05/10/2023    CO2 21 04/03/2022    BUN 13.2 05/10/2023    BUN 11 04/03/2022     Lab Results   Component Value Date    WBC 8.3 04/03/2022    HGB 16.8 04/03/2022    HCT 49.3 04/03/2022    MCV 89 04/03/2022     04/03/2022     Lab Results   Component Value Date    CHOL 207 05/10/2023    TRIG 98 05/10/2023    HDL 50 05/10/2023       Lab Results   Component Value Date    TROPONINI <0.01 04/03/2022     Lab Results   Component Value Date    TSH 2.45 05/10/2023    TSH 2.25 01/10/2020                  "

## 2023-11-16 ENCOUNTER — HOSPITAL ENCOUNTER (OUTPATIENT)
Dept: MRI IMAGING | Facility: HOSPITAL | Age: 68
Discharge: HOME | End: 2023-11-16
Attending: INTERNAL MEDICINE
Payer: MEDICARE

## 2023-11-16 VITALS — DIASTOLIC BLOOD PRESSURE: 67 MMHG | HEART RATE: 84 BPM | OXYGEN SATURATION: 94 % | SYSTOLIC BLOOD PRESSURE: 113 MMHG

## 2023-11-16 DIAGNOSIS — E78.5 DYSLIPIDEMIA: ICD-10-CM

## 2023-11-16 DIAGNOSIS — R06.09 DOE (DYSPNEA ON EXERTION): ICD-10-CM

## 2023-11-16 DIAGNOSIS — I10 HYPERTENSION, UNSPECIFIED TYPE: Primary | ICD-10-CM

## 2023-11-16 DIAGNOSIS — I10 HYPERTENSION, UNSPECIFIED TYPE: ICD-10-CM

## 2023-11-16 DIAGNOSIS — R07.2 PRECORDIAL PAIN: ICD-10-CM

## 2023-11-16 LAB
ATRIAL RATE - MUSE: 69 BPM
ATRIAL RATE - MUSE: 75 BPM
DIASTOLIC BLOOD PRESSURE - MUSE: NORMAL MMHG
DIASTOLIC BLOOD PRESSURE - MUSE: NORMAL MMHG
INTERPRETATION ECG - MUSE: NORMAL
INTERPRETATION ECG - MUSE: NORMAL
P AXIS - MUSE: 21 DEGREES
P AXIS - MUSE: 26 DEGREES
PR INTERVAL - MUSE: 164 MS
PR INTERVAL - MUSE: 178 MS
QRS DURATION - MUSE: 84 MS
QRS DURATION - MUSE: 86 MS
QT - MUSE: 392 MS
QT - MUSE: 402 MS
QTC - MUSE: 430 MS
QTC - MUSE: 437 MS
R AXIS - MUSE: 21 DEGREES
R AXIS - MUSE: 33 DEGREES
SYSTOLIC BLOOD PRESSURE - MUSE: NORMAL MMHG
SYSTOLIC BLOOD PRESSURE - MUSE: NORMAL MMHG
T AXIS - MUSE: 35 DEGREES
T AXIS - MUSE: 46 DEGREES
VENTRICULAR RATE- MUSE: 69 BPM
VENTRICULAR RATE- MUSE: 75 BPM

## 2023-11-16 PROCEDURE — G1010 CDSM STANSON: HCPCS

## 2023-11-16 PROCEDURE — 255N000002 HC RX 255 OP 636: Mod: JZ | Performed by: INTERNAL MEDICINE

## 2023-11-16 PROCEDURE — G1010 CDSM STANSON: HCPCS | Performed by: GENERAL ACUTE CARE HOSPITAL

## 2023-11-16 PROCEDURE — 93016 CV STRESS TEST SUPVJ ONLY: CPT | Performed by: INTERNAL MEDICINE

## 2023-11-16 PROCEDURE — 93018 CV STRESS TEST I&R ONLY: CPT | Performed by: GENERAL ACUTE CARE HOSPITAL

## 2023-11-16 PROCEDURE — 75563 CARD MRI W/STRESS IMG & DYE: CPT | Mod: 26 | Performed by: GENERAL ACUTE CARE HOSPITAL

## 2023-11-16 PROCEDURE — A9585 GADOBUTROL INJECTION: HCPCS | Mod: JZ | Performed by: INTERNAL MEDICINE

## 2023-11-16 PROCEDURE — 93005 ELECTROCARDIOGRAM TRACING: CPT

## 2023-11-16 PROCEDURE — 250N000011 HC RX IP 250 OP 636: Performed by: INTERNAL MEDICINE

## 2023-11-16 PROCEDURE — 93010 ELECTROCARDIOGRAM REPORT: CPT | Mod: HOP | Performed by: GENERAL ACUTE CARE HOSPITAL

## 2023-11-16 PROCEDURE — 999N000122 MR MYOCARDIUM  OVERREAD

## 2023-11-16 RX ORDER — GADOBUTROL 604.72 MG/ML
16 INJECTION INTRAVENOUS ONCE
Status: COMPLETED | OUTPATIENT
Start: 2023-11-16 | End: 2023-11-16

## 2023-11-16 RX ORDER — AMINOPHYLLINE 25 MG/ML
50 INJECTION, SOLUTION INTRAVENOUS
Status: DISCONTINUED | OUTPATIENT
Start: 2023-11-16 | End: 2023-11-16 | Stop reason: HOSPADM

## 2023-11-16 RX ORDER — REGADENOSON 0.08 MG/ML
0.4 INJECTION, SOLUTION INTRAVENOUS ONCE
Status: COMPLETED | OUTPATIENT
Start: 2023-11-16 | End: 2023-11-16

## 2023-11-16 RX ADMIN — REGADENOSON 0.4 MG: 0.08 INJECTION, SOLUTION INTRAVENOUS at 08:09

## 2023-11-16 RX ADMIN — GADOBUTROL 16 ML: 604.72 INJECTION INTRAVENOUS at 08:02

## 2023-11-20 ENCOUNTER — TELEPHONE (OUTPATIENT)
Dept: CARDIOLOGY | Facility: CLINIC | Age: 68
End: 2023-11-20
Payer: MEDICARE

## 2023-11-20 NOTE — TELEPHONE ENCOUNTER
[facility] Call Center    Phone Message    May a detailed message be left on voicemail: yes     Reason for Call: Other: Patient called stating they received a missed call from someone in regards to their MRI and heart issues. Please call patient back to further discuss.     Action Taken: Other: Cardiology    Travel Screening: Not Applicable    Thank you!  [facility]

## 2023-11-20 NOTE — TELEPHONE ENCOUNTER
Health Call Center    Phone Message    May a detailed message be left on voicemail: yes     Reason for Call: Other: Patient returning call in regards to her MRI results. Please call patient back to further discuss.     Action Taken: Message routed to:  Other: Cardiology    Travel Screening: Not Applicable    Thank you!  [facility]

## 2024-01-03 ENCOUNTER — NURSE TRIAGE (OUTPATIENT)
Dept: NURSING | Facility: CLINIC | Age: 69
End: 2024-01-03
Payer: MEDICARE

## 2024-01-03 NOTE — TELEPHONE ENCOUNTER
"Nurse Triage SBAR    Is this a 2nd Level Triage? YES, LICENSED PRACTITIONER REVIEW IS REQUIRED    Situation: Pt. reports shingles outbreak, medication request.    Background: Noticed rash on lower back yesterday. Mentions that she has had shingles before.    Assessment: Painful, itchy rash localized to lower back. Rates pain 4-5/10, describes as \"nerve\" pain. Reports severe itch. Multiple blisters grouped together, some are the size of a quarter and some are fluid filled.    Protocol Recommended Disposition:   Go To ED/UCC Now (Or To Office With PCP Approval)    Recommendation: Pt. mentions that she is still getting over covid symptoms and doesn't feel like she can come in for a visit. She is hoping her doctor can prescribe medication for the shingles. Please advise.    Routed to provider Dr. Locke care team    Does the patient meet one of the following criteria for ADS visit consideration? 16+ years old, with an [facility] PCP     TIP  Providers, please consider if this condition is appropriate for management at one of our Acute and Diagnostic Services sites.     If patient is a good candidate, please use dotphrase <dot>triageresponse and select Refer to ADS to document.    Keyana Garcia RN on 1/3/2024 at 11:46 AM     Reason for Disposition   Shingles rash and spots start appearing other places on body    Additional Information   Negative: Difficult to awaken or acting confused (e.g., disoriented, slurred speech)   Negative: Sounds like a life-threatening emergency to the triager   Negative: Shingles rash of face and eye pain or blurred vision   Negative: Shingles rash on the eyelid or tip of the nose    Protocols used: Shingles (Zoster)-A-OH    "

## 2024-01-03 NOTE — TELEPHONE ENCOUNTER
Pt notified. Walk in care hours discussed. Other clinic locations discussed as well for evaluation of shingles rash.

## 2024-01-03 NOTE — TELEPHONE ENCOUNTER
Provider Response to 2nd Level Triage Request    I have reviewed the RN documentation. My recommendation is:  Refer to Urgent Care    Carina Locke MD on 1/3/2024 at 12:04 PM;

## 2024-03-15 ENCOUNTER — OFFICE VISIT (OUTPATIENT)
Dept: FAMILY MEDICINE | Facility: CLINIC | Age: 69
End: 2024-03-15
Payer: MEDICARE

## 2024-03-15 VITALS
OXYGEN SATURATION: 96 % | SYSTOLIC BLOOD PRESSURE: 136 MMHG | WEIGHT: 145 LBS | RESPIRATION RATE: 16 BRPM | BODY MASS INDEX: 25.69 KG/M2 | HEIGHT: 63 IN | TEMPERATURE: 97.8 F | DIASTOLIC BLOOD PRESSURE: 76 MMHG | HEART RATE: 70 BPM

## 2024-03-15 DIAGNOSIS — I10 BENIGN ESSENTIAL HYPERTENSION: ICD-10-CM

## 2024-03-15 DIAGNOSIS — R05.3 CHRONIC COUGH: ICD-10-CM

## 2024-03-15 DIAGNOSIS — K21.00 GASTROESOPHAGEAL REFLUX DISEASE WITH ESOPHAGITIS, UNSPECIFIED WHETHER HEMORRHAGE: ICD-10-CM

## 2024-03-15 DIAGNOSIS — J01.00 ACUTE NON-RECURRENT MAXILLARY SINUSITIS: Primary | ICD-10-CM

## 2024-03-15 DIAGNOSIS — E78.2 MIXED HYPERLIPIDEMIA: Chronic | ICD-10-CM

## 2024-03-15 DIAGNOSIS — Z87.891 PERSONAL HISTORY OF TOBACCO USE, PRESENTING HAZARDS TO HEALTH: ICD-10-CM

## 2024-03-15 DIAGNOSIS — J44.9 CHRONIC OBSTRUCTIVE PULMONARY DISEASE, UNSPECIFIED COPD TYPE (H): Chronic | ICD-10-CM

## 2024-03-15 DIAGNOSIS — F41.9 ANXIETY: ICD-10-CM

## 2024-03-15 PROCEDURE — 99214 OFFICE O/P EST MOD 30 MIN: CPT

## 2024-03-15 RX ORDER — PREDNISONE 20 MG/1
20 TABLET ORAL DAILY
Qty: 7 TABLET | Refills: 0 | Status: SHIPPED | OUTPATIENT
Start: 2024-03-15 | End: 2024-03-29

## 2024-03-15 RX ORDER — HYDROXYZINE HYDROCHLORIDE 25 MG/1
25 TABLET, FILM COATED ORAL AT BEDTIME
Qty: 90 TABLET | Refills: 3 | Status: SHIPPED | OUTPATIENT
Start: 2024-03-15 | End: 2024-09-16

## 2024-03-15 RX ORDER — AMLODIPINE BESYLATE 5 MG/1
5 TABLET ORAL DAILY
Qty: 90 TABLET | Refills: 3 | Status: SHIPPED | OUTPATIENT
Start: 2024-03-15 | End: 2024-09-16

## 2024-03-15 RX ORDER — FLUTICASONE PROPIONATE 50 MCG
2 SPRAY, SUSPENSION (ML) NASAL DAILY
Qty: 16 G | Refills: 4 | Status: SHIPPED | OUTPATIENT
Start: 2024-03-15 | End: 2024-09-16

## 2024-03-15 RX ORDER — DOXYCYCLINE 100 MG/1
100 CAPSULE ORAL 2 TIMES DAILY
Qty: 14 CAPSULE | Refills: 0 | Status: SHIPPED | OUTPATIENT
Start: 2024-03-15 | End: 2024-03-29

## 2024-03-15 RX ORDER — ATORVASTATIN CALCIUM 20 MG/1
10 TABLET, FILM COATED ORAL DAILY
Qty: 90 TABLET | Refills: 3 | Status: SHIPPED | OUTPATIENT
Start: 2024-03-15 | End: 2024-09-16

## 2024-03-15 RX ORDER — RESPIRATORY SYNCYTIAL VIRUS VACCINE 120MCG/0.5
0.5 KIT INTRAMUSCULAR ONCE
Qty: 1 EACH | Refills: 0 | Status: CANCELLED | OUTPATIENT
Start: 2024-03-15 | End: 2024-03-15

## 2024-03-15 RX ORDER — ALBUTEROL SULFATE 90 UG/1
AEROSOL, METERED RESPIRATORY (INHALATION)
Qty: 18 G | Refills: 11 | Status: SHIPPED | OUTPATIENT
Start: 2024-03-15 | End: 2024-09-16

## 2024-03-15 RX ORDER — SACCHAROMYCES BOULARDII 250 MG
250 CAPSULE ORAL 2 TIMES DAILY
Qty: 42 CAPSULE | Refills: 0 | Status: SHIPPED | OUTPATIENT
Start: 2024-03-15 | End: 2024-04-05

## 2024-03-15 RX ORDER — FAMOTIDINE 20 MG/1
20 TABLET, FILM COATED ORAL 2 TIMES DAILY
Qty: 60 TABLET | Refills: 3 | Status: SHIPPED | OUTPATIENT
Start: 2024-03-15 | End: 2024-09-16

## 2024-03-15 ASSESSMENT — ANXIETY QUESTIONNAIRES
3. WORRYING TOO MUCH ABOUT DIFFERENT THINGS: NOT AT ALL
4. TROUBLE RELAXING: SEVERAL DAYS
IF YOU CHECKED OFF ANY PROBLEMS ON THIS QUESTIONNAIRE, HOW DIFFICULT HAVE THESE PROBLEMS MADE IT FOR YOU TO DO YOUR WORK, TAKE CARE OF THINGS AT HOME, OR GET ALONG WITH OTHER PEOPLE: SOMEWHAT DIFFICULT
7. FEELING AFRAID AS IF SOMETHING AWFUL MIGHT HAPPEN: NOT AT ALL
GAD7 TOTAL SCORE: 1
6. BECOMING EASILY ANNOYED OR IRRITABLE: NOT AT ALL
2. NOT BEING ABLE TO STOP OR CONTROL WORRYING: NOT AT ALL
5. BEING SO RESTLESS THAT IT IS HARD TO SIT STILL: NOT AT ALL
8. IF YOU CHECKED OFF ANY PROBLEMS, HOW DIFFICULT HAVE THESE MADE IT FOR YOU TO DO YOUR WORK, TAKE CARE OF THINGS AT HOME, OR GET ALONG WITH OTHER PEOPLE?: SOMEWHAT DIFFICULT
GAD7 TOTAL SCORE: 1
1. FEELING NERVOUS, ANXIOUS, OR ON EDGE: NOT AT ALL
GAD7 TOTAL SCORE: 1
7. FEELING AFRAID AS IF SOMETHING AWFUL MIGHT HAPPEN: NOT AT ALL

## 2024-03-15 ASSESSMENT — PATIENT HEALTH QUESTIONNAIRE - PHQ9
10. IF YOU CHECKED OFF ANY PROBLEMS, HOW DIFFICULT HAVE THESE PROBLEMS MADE IT FOR YOU TO DO YOUR WORK, TAKE CARE OF THINGS AT HOME, OR GET ALONG WITH OTHER PEOPLE: SOMEWHAT DIFFICULT
SUM OF ALL RESPONSES TO PHQ QUESTIONS 1-9: 6
SUM OF ALL RESPONSES TO PHQ QUESTIONS 1-9: 6

## 2024-03-15 NOTE — LETTER
My COPD Action Plan     Name: Nettie Paul    YOB: 1955   Date: 3/15/2024    My doctor: Asya Medina PA-C   My clinic: [address], [facility] [address]  [facility] [id number] [phone]  My Controller Medicine: { :734523}   Dose: ***     My Rescue Medicine: { :125840}   Dose: ***     My Flare Up Medicine: { :837205}   Dose: ***     My COPD Severity: { :487924}      Use of Oxygen: { :726584}     Make sure you've had your pneumonia   vaccines.          GREEN ZONE       Doing well today    Usual level of activity and exercise  Usual amount of cough and mucus  No shortness of breath  Usual level of health (thinking clearly, sleeping well, feel like eating) Actions:    Take daily medicines  Use oxygen as prescribed  Follow regular exercise and diet plan  Avoid cigarette smoke and other irritants that harm the lungs           YELLOW ZONE          Having a bad day or flare up    Short of breath more than usual  A lot more sputum (mucus) than usual  Sputum looks yellow, green, tan, brown or bloody  More coughing or wheezing  Fever or chills  Less energy; trouble completing activities  Trouble thinking or focusing  Using quick relief inhaler or nebulizer more often  Poor sleep; symptoms wake me up  Do not feel like eating Actions:    Get plenty of rest  Take daily medicines  Use quick relief inhaler every *** hours  If you use oxygen, call you doctor to see if you should adjust your oxygen  Do breathing exercises or other things to help you relax  Let a loved one, friend or neighbor know you are feeling worse  Call your care team if you have 2 or more symptoms.  Start taking steroids or antibiotics if directed by your care team           RED ZONE       Need medical care now    Severe shortness of breath (feel you can't breathe)  Fever, chills  Not enough breath to do any activity  Trouble coughing up mucus, walking or  talking  Blood in mucus  Frequent coughing Rescue medicines are not working  Not able to sleep because of breathing  Feel confused or drowsy  Chest pain    Actions:    Call your health care team.  If you cannot reach your care team, call 911 or go to the emergency room.        Annual Reminders:  Meet with Care Team, Flu Shot every Fall  Pharmacy:     [facility] [place] - [id number] [id number] [address] AT [facility] [address]

## 2024-03-15 NOTE — PROGRESS NOTES
Assessment & Plan     Acute non-recurrent maxillary sinusitis  Patient with symptoms consistent with sinusitis. Will start doxycyline - no known allergy. Educated regarding potential SE. Prescribed probiotic to take while taking antibiotic and 1-2 weeks afterwards. Patient to get she should experience relief of symptoms in 2 to 3 days, if she does not she should return to clinic.  Patient is also educated regarding worrisome symptoms and when to return for urgent evaluation.   - doxycycline hyclate (VIBRAMYCIN) 100 MG capsule  Dispense: 14 capsule; Refill: 0  - saccharomyces boulardii (FLORASTOR) 250 MG capsule  Dispense: 42 capsule; Refill: 0    Chronic cough  Chronic in nature due to history of COPD. However, cough has been productive of yellow phlegm and worse than normal. Will give a dose of steroids to see if this helps.   - predniSONE (DELTASONE) 20 MG tablet  Dispense: 7 tablet; Refill: 0    Chronic obstructive pulmonary disease, unspecified COPD type (H)  Requesting medication refill. Symptoms stable overall.  - albuterol (PROAIR HFA/PROVENTIL HFA/VENTOLIN HFA) 108 (90 Base) MCG/ACT inhaler  Dispense: 18 g; Refill: 11  - fluticasone (FLONASE) 50 MCG/ACT nasal spray  Dispense: 16 g; Refill: 4  - umeclidinium (INCRUSE ELLIPTA) 62.5 MCG/ACT inhaler  Dispense: 3 each; Refill: 3    Benign essential hypertension  Requesting medication refill.   - amLODIPine (NORVASC) 5 MG tablet  Dispense: 90 tablet; Refill: 3    Mixed hyperlipidemia  Requesting medication refill.   - atorvastatin (LIPITOR) 20 MG tablet  Dispense: 90 tablet; Refill: 3    Anxiety  Stable, requesting medication refill.   - hydrOXYzine HCl (ATARAX) 25 MG tablet  Dispense: 90 tablet; Refill: 3    Personal history of tobacco use, presenting hazards to health  - CT Chest Lung Cancer Screen Low Dose Without    Gastroesophageal reflux disease with esophagitis, unspecified whether hemorrhage  Patient reporting symptoms consistent with GERD that will  relieved when she takes over-the-counter Tums.  Patient is wondering if she could try another medication.  Given her age we will start with famotidine over a PPI.  Patient is educated regarding lifestyle changes.  - famotidine (PEPCID) 20 MG tablet  Dispense: 60 tablet; Refill: 3      Subjective   Nettie is a 69 year old, presenting for the following health issues:  Cough (Has had a cough for roughly a month. Stuffy head/nose/sinus issues for a couple weeks. Yellow mucus. Had Covid a month ago. )        3/15/2024     7:04 AM   Additional Questions   Roomed by Philly     History of Present Illness       Reason for visit:  Check up cold med refill  Symptom onset:  3-4 weeks ago  Symptoms include:  Head acks cought no energ  Symptom intensity:  Severe  Symptom progression:  Staying the same  Had these symptoms before:  Yes  Has tried/received treatment for these symptoms:  Yes  Previous treatment was successful:  Yes  Prior treatment description:  Mend  What makes it worse:  No  What makes it better:  Dont    She eats 2-3 servings of fruits and vegetables daily.She consumes 2 sweetened beverage(s) daily.She exercises with enough effort to increase her heart rate 10 to 19 minutes per day.  She exercises with enough effort to increase her heart rate 4 days per week.   She is taking medications regularly.     Acute Illness  Acute illness concerns: concern for sinusitis.   Onset/Duration: Around abby patient had COVID. Since then a lingering productive cough of yellow sputum, congestion. This has been on going for a few months now.  Symptoms:  Fever: No  Chills/Sweats: No  Headache (location?): YES  Sinus Pressure: YES  Conjunctivitis:  No  Ear Pain: no  Rhinorrhea: YES  Congestion: YES  Sore Throat: it is itchy  Cough: YES, productive of yellow sputum  Wheeze: YES  Decreased Appetite: No  Nausea: No  Vomiting: No  Diarrhea: No  Dysuria/Freq.: No  Dysuria or Hematuria: No  Fatigue/Achiness: achy at times   Sick/Strep  "Exposure: No  Therapies tried and outcome: Claritin, and aleve with leg soreness at night. She has been using her albuterol inhaler and umeclidinium daily.       Review of Systems  Constitutional, HEENT, cardiovascular, pulmonary, gi and gu systems are negative, except as otherwise noted.      Objective    /76   Pulse 70   Temp 97.8  F (36.6  C)   Resp 16   Ht 1.588 m (5' 2.5\")   Wt 65.8 kg (145 lb)   SpO2 96%   BMI 26.10 kg/m    Body mass index is 26.1 kg/m .  Physical Exam   GENERAL: alert and no distress  EYES: Eyes grossly normal to inspection, conjunctivae and sclerae normal  NECK: no adenopathy, no asymmetry, masses, or scars  RESP: rhonchi throughout  CV: regular rate and rhythm, normal S1 S2, no S3 or S4, no murmur, click or rub, no peripheral edema        Signed Electronically by: Asya Medina PA-C    "

## 2024-03-28 ENCOUNTER — HOSPITAL ENCOUNTER (OUTPATIENT)
Dept: CT IMAGING | Facility: CLINIC | Age: 69
Discharge: HOME | End: 2024-03-28
Payer: MEDICARE

## 2024-03-28 DIAGNOSIS — Z87.891 PERSONAL HISTORY OF TOBACCO USE, PRESENTING HAZARDS TO HEALTH: ICD-10-CM

## 2024-03-28 PROCEDURE — 71271 CT THORAX LUNG CANCER SCR C-: CPT

## 2024-03-29 DIAGNOSIS — J01.00 ACUTE NON-RECURRENT MAXILLARY SINUSITIS: ICD-10-CM

## 2024-03-29 DIAGNOSIS — J44.9 CHRONIC OBSTRUCTIVE PULMONARY DISEASE, UNSPECIFIED COPD TYPE (H): Chronic | ICD-10-CM

## 2024-03-29 DIAGNOSIS — R05.3 CHRONIC COUGH: ICD-10-CM

## 2024-03-29 NOTE — TELEPHONE ENCOUNTER
Pt states medication for sinus infection went to [facility] mail order and she cannot get it filled by them- she shpuld have gotten them at [facility]    She is still sick and wants prednisone and abx asap, she does not feel well at all.    Also would like to go over results of CT scam please     Refill Request  Medication name: Pending Prescriptions:                       Disp   Refills    doxycycline hyclate (VIBRAMYCIN) 100 MG c*14 cap*0            Sig: Take 1 capsule (100 mg) by mouth 2 times daily    predniSONE (DELTASONE) 20 MG tablet       7 tabl*0            Sig: Take 1 tablet (20 mg) by mouth daily    Requested Pharmacy:  [facility] #[id number] [address] AT [facility] [address]

## 2024-03-29 NOTE — TELEPHONE ENCOUNTER
Pt calling in regards to med requests to local pharm and CT results. Informed pt of neg CT scan and that med requests have been sent to provider with correct pharm. No triage done.    Anushka Glasgow, RN, BSN  [facility] Nurse Advisor 1:13 PM 3/29/2024

## 2024-04-01 RX ORDER — DOXYCYCLINE 100 MG/1
100 CAPSULE ORAL 2 TIMES DAILY
Qty: 14 CAPSULE | Refills: 0 | Status: SHIPPED | OUTPATIENT
Start: 2024-04-01 | End: 2024-09-16

## 2024-04-01 RX ORDER — PREDNISONE 20 MG/1
20 TABLET ORAL DAILY
Qty: 7 TABLET | Refills: 0 | Status: SHIPPED | OUTPATIENT
Start: 2024-04-01 | End: 2024-09-16

## 2024-05-07 ENCOUNTER — PATIENT OUTREACH (OUTPATIENT)
Dept: CARE COORDINATION | Facility: CLINIC | Age: 69
End: 2024-05-07
Payer: MEDICARE

## 2024-08-15 ENCOUNTER — PATIENT OUTREACH (OUTPATIENT)
Dept: CARE COORDINATION | Facility: CLINIC | Age: 69
End: 2024-08-15
Payer: MEDICARE

## 2024-08-29 ENCOUNTER — PATIENT OUTREACH (OUTPATIENT)
Dept: CARE COORDINATION | Facility: CLINIC | Age: 69
End: 2024-08-29
Payer: MEDICARE

## 2024-09-16 ENCOUNTER — OFFICE VISIT (OUTPATIENT)
Dept: FAMILY MEDICINE | Facility: CLINIC | Age: 69
End: 2024-09-16
Payer: MEDICARE

## 2024-09-16 VITALS
HEART RATE: 75 BPM | WEIGHT: 141 LBS | SYSTOLIC BLOOD PRESSURE: 128 MMHG | DIASTOLIC BLOOD PRESSURE: 78 MMHG | BODY MASS INDEX: 24.98 KG/M2 | HEIGHT: 63 IN | TEMPERATURE: 97.7 F | OXYGEN SATURATION: 95 % | RESPIRATION RATE: 16 BRPM

## 2024-09-16 DIAGNOSIS — E03.9 HYPOTHYROIDISM, UNSPECIFIED TYPE: Chronic | ICD-10-CM

## 2024-09-16 DIAGNOSIS — K21.00 GASTROESOPHAGEAL REFLUX DISEASE WITH ESOPHAGITIS, UNSPECIFIED WHETHER HEMORRHAGE: ICD-10-CM

## 2024-09-16 DIAGNOSIS — N95.1 VAGINAL DRYNESS, MENOPAUSAL: ICD-10-CM

## 2024-09-16 DIAGNOSIS — Z12.31 ENCOUNTER FOR SCREENING MAMMOGRAM FOR BREAST CANCER: ICD-10-CM

## 2024-09-16 DIAGNOSIS — I10 HYPERTENSION, UNSPECIFIED TYPE: ICD-10-CM

## 2024-09-16 DIAGNOSIS — G47.00 INSOMNIA, UNSPECIFIED TYPE: ICD-10-CM

## 2024-09-16 DIAGNOSIS — E78.2 MIXED HYPERLIPIDEMIA: Chronic | ICD-10-CM

## 2024-09-16 DIAGNOSIS — R73.03 PRE-DIABETES: ICD-10-CM

## 2024-09-16 DIAGNOSIS — Z00.00 ENCOUNTER FOR MEDICARE ANNUAL WELLNESS EXAM: Primary | ICD-10-CM

## 2024-09-16 DIAGNOSIS — F41.9 ANXIETY: ICD-10-CM

## 2024-09-16 DIAGNOSIS — Z12.31 VISIT FOR SCREENING MAMMOGRAM: ICD-10-CM

## 2024-09-16 DIAGNOSIS — J44.9 CHRONIC OBSTRUCTIVE PULMONARY DISEASE, UNSPECIFIED COPD TYPE (H): Chronic | ICD-10-CM

## 2024-09-16 DIAGNOSIS — F33.0 MILD EPISODE OF RECURRENT MAJOR DEPRESSIVE DISORDER (H): ICD-10-CM

## 2024-09-16 LAB
ALBUMIN SERPL BCG-MCNC: 4.4 G/DL (ref 3.5–5.2)
ALP SERPL-CCNC: 79 U/L (ref 40–150)
ALT SERPL W P-5'-P-CCNC: 16 U/L (ref 0–50)
ANION GAP SERPL CALCULATED.3IONS-SCNC: 8 MMOL/L (ref 7–15)
AST SERPL W P-5'-P-CCNC: 26 U/L (ref 0–45)
BILIRUB SERPL-MCNC: 0.3 MG/DL
BUN SERPL-MCNC: 15.2 MG/DL (ref 8–23)
CALCIUM SERPL-MCNC: 9.5 MG/DL (ref 8.8–10.4)
CHLORIDE SERPL-SCNC: 106 MMOL/L (ref 98–107)
CHOLEST SERPL-MCNC: 160 MG/DL
CREAT SERPL-MCNC: 0.68 MG/DL (ref 0.51–0.95)
EGFRCR SERPLBLD CKD-EPI 2021: >90 ML/MIN/1.73M2
ERYTHROCYTE [DISTWIDTH] IN BLOOD BY AUTOMATED COUNT: 12.8 % (ref 10–15)
FASTING STATUS PATIENT QL REPORTED: YES
FASTING STATUS PATIENT QL REPORTED: YES
GLUCOSE SERPL-MCNC: 103 MG/DL (ref 70–99)
HBA1C MFR BLD: 6.2 % (ref 0–5.6)
HCO3 SERPL-SCNC: 27 MMOL/L (ref 22–29)
HCT VFR BLD AUTO: 45.4 % (ref 35–47)
HDLC SERPL-MCNC: 50 MG/DL
HGB BLD-MCNC: 15 G/DL (ref 11.7–15.7)
LDLC SERPL CALC-MCNC: 92 MG/DL
MCH RBC QN AUTO: 28.7 PG (ref 26.5–33)
MCHC RBC AUTO-ENTMCNC: 33 G/DL (ref 31.5–36.5)
MCV RBC AUTO: 87 FL (ref 78–100)
NONHDLC SERPL-MCNC: 110 MG/DL
PLATELET # BLD AUTO: 286 10E3/UL (ref 150–450)
POTASSIUM SERPL-SCNC: 4.9 MMOL/L (ref 3.4–5.3)
PROT SERPL-MCNC: 7.1 G/DL (ref 6.4–8.3)
RBC # BLD AUTO: 5.23 10E6/UL (ref 3.8–5.2)
SODIUM SERPL-SCNC: 141 MMOL/L (ref 135–145)
TRIGL SERPL-MCNC: 88 MG/DL
TSH SERPL DL<=0.005 MIU/L-ACNC: 2.92 UIU/ML (ref 0.3–4.2)
WBC # BLD AUTO: 9.4 10E3/UL (ref 4–11)

## 2024-09-16 PROCEDURE — 84443 ASSAY THYROID STIM HORMONE: CPT

## 2024-09-16 PROCEDURE — 83036 HEMOGLOBIN GLYCOSYLATED A1C: CPT

## 2024-09-16 PROCEDURE — 80061 LIPID PANEL: CPT

## 2024-09-16 PROCEDURE — 36415 COLL VENOUS BLD VENIPUNCTURE: CPT

## 2024-09-16 PROCEDURE — G0439 PPPS, SUBSEQ VISIT: HCPCS

## 2024-09-16 PROCEDURE — 85027 COMPLETE CBC AUTOMATED: CPT

## 2024-09-16 PROCEDURE — 99214 OFFICE O/P EST MOD 30 MIN: CPT | Mod: 25

## 2024-09-16 PROCEDURE — 80053 COMPREHEN METABOLIC PANEL: CPT

## 2024-09-16 RX ORDER — ALBUTEROL SULFATE 90 UG/1
AEROSOL, METERED RESPIRATORY (INHALATION)
Qty: 18 G | Refills: 11 | Status: SHIPPED | OUTPATIENT
Start: 2024-09-16

## 2024-09-16 RX ORDER — FAMOTIDINE 20 MG/1
20 TABLET, FILM COATED ORAL 2 TIMES DAILY
Qty: 60 TABLET | Refills: 3 | Status: SHIPPED | OUTPATIENT
Start: 2024-09-16

## 2024-09-16 RX ORDER — FLUTICASONE PROPIONATE 50 MCG
2 SPRAY, SUSPENSION (ML) NASAL DAILY
Qty: 16 G | Refills: 4 | Status: SHIPPED | OUTPATIENT
Start: 2024-09-16

## 2024-09-16 RX ORDER — AZITHROMYCIN 250 MG/1
TABLET, FILM COATED ORAL
Qty: 6 TABLET | Refills: 0 | Status: SHIPPED | OUTPATIENT
Start: 2024-09-16 | End: 2024-09-21

## 2024-09-16 RX ORDER — AMLODIPINE BESYLATE 5 MG/1
5 TABLET ORAL DAILY
Qty: 90 TABLET | Refills: 3 | Status: SHIPPED | OUTPATIENT
Start: 2024-09-16

## 2024-09-16 RX ORDER — ATORVASTATIN CALCIUM 20 MG/1
10 TABLET, FILM COATED ORAL DAILY
Qty: 90 TABLET | Refills: 3 | Status: SHIPPED | OUTPATIENT
Start: 2024-09-16

## 2024-09-16 RX ORDER — HYDROXYZINE HYDROCHLORIDE 25 MG/1
25 TABLET, FILM COATED ORAL AT BEDTIME
Qty: 90 TABLET | Refills: 3 | Status: SHIPPED | OUTPATIENT
Start: 2024-09-16

## 2024-09-16 RX ORDER — PREDNISONE 20 MG/1
20 TABLET ORAL DAILY
Qty: 7 TABLET | Refills: 0 | Status: SHIPPED | OUTPATIENT
Start: 2024-09-16

## 2024-09-16 RX ORDER — PREDNISONE 20 MG/1
20 TABLET ORAL DAILY
Qty: 7 TABLET | Refills: 0 | Status: CANCELLED | OUTPATIENT
Start: 2024-09-16

## 2024-09-16 RX ORDER — ALBUTEROL SULFATE 0.83 MG/ML
2.5 SOLUTION RESPIRATORY (INHALATION) EVERY 4 HOURS PRN
Qty: 75 ML | Refills: 3 | Status: SHIPPED | OUTPATIENT
Start: 2024-09-16

## 2024-09-16 RX ORDER — ESTRADIOL 0.1 MG/G
2 CREAM VAGINAL
Qty: 42.5 G | Refills: 3 | Status: SHIPPED | OUTPATIENT
Start: 2024-09-16

## 2024-09-16 ASSESSMENT — ANXIETY QUESTIONNAIRES
8. IF YOU CHECKED OFF ANY PROBLEMS, HOW DIFFICULT HAVE THESE MADE IT FOR YOU TO DO YOUR WORK, TAKE CARE OF THINGS AT HOME, OR GET ALONG WITH OTHER PEOPLE?: NOT DIFFICULT AT ALL
GAD7 TOTAL SCORE: 1
GAD7 TOTAL SCORE: 1
7. FEELING AFRAID AS IF SOMETHING AWFUL MIGHT HAPPEN: NOT AT ALL
GAD7 TOTAL SCORE: 1

## 2024-09-16 ASSESSMENT — PATIENT HEALTH QUESTIONNAIRE - PHQ9
SUM OF ALL RESPONSES TO PHQ QUESTIONS 1-9: 4
10. IF YOU CHECKED OFF ANY PROBLEMS, HOW DIFFICULT HAVE THESE PROBLEMS MADE IT FOR YOU TO DO YOUR WORK, TAKE CARE OF THINGS AT HOME, OR GET ALONG WITH OTHER PEOPLE: SOMEWHAT DIFFICULT
SUM OF ALL RESPONSES TO PHQ QUESTIONS 1-9: 4

## 2024-09-16 NOTE — PROGRESS NOTES
Preventive Care Visit  [facility]  Latosha Frederick PA-C, Family Medicine  Sep 16, 2024      Assessment & Plan     Encounter for Medicare annual wellness exam  Patient seen today for annual physical exam.  Routine health maintenance reviewed.   Vaccinations: Due for flu vaccine after her vacation.  Breast cancer screen: Last completed back in 2021.  Is due for repeat imaging and orders placed.  Colon cancer screen: Up-to-date.  Lung cancer screen: UTD. Repeat March 2025  Acute and chronic concerns addressed below.   - REVIEW OF HEALTH MAINTENANCE PROTOCOL ORDERS  - PRIMARY CARE FOLLOW-UP SCHEDULING    Mixed hyperlipidemia  Recheck fasting lipid panel today.  Continue atorvastatin 20 mg once daily - medication is refilled.  Recheck CMP.  - Lipid panel reflex to direct LDL Fasting  - atorvastatin (LIPITOR) 20 MG tablet  Dispense: 90 tablet; Refill: 3  - Comprehensive metabolic panel (BMP + Alb, Alk Phos, ALT, AST, Total. Bili, TP)    Chronic obstructive pulmonary disease, unspecified COPD type (H)  Feels with the recent smoke in the air she has had some more chest tightness.  Noting some increased chest tightness and wheezing. Bilateral wheezing noted on PE, no rhonchi or rales. Will refill course of prednisone. In an abundance of caution will also have patient complete a course of azithromycin. Refilled her inhalers and nebs. She will let us know if worsening symptoms or no improvement.  - Spirometry, Breathing Capacity, Normal Order, Clinic Performed  - umeclidinium (INCRUSE ELLIPTA) 62.5 MCG/ACT inhaler  Dispense: 3 each; Refill: 3  - fluticasone (FLONASE) 50 MCG/ACT nasal spray  Dispense: 16 g; Refill: 4  - albuterol (PROVENTIL) (2.5 MG/3ML) 0.083% neb solution  Dispense: 75 mL; Refill: 3  - albuterol (PROAIR HFA/PROVENTIL HFA/VENTOLIN HFA) 108 (90 Base) MCG/ACT inhaler  Dispense: 18 g; Refill: 11  - azithromycin (ZITHROMAX) 250 MG tablet  Dispense: 6 tablet; Refill: 0  - predniSONE  (DELTASONE) 20 MG tablet  Dispense: 7 tablet; Refill: 0    Anxiety  Mild episode of recurrent major depressive disorder (H24)  PHQ-9 score today 4   KEITH-7 score today 1  Stable  Medications: Hydroxyzine 25 mg prn to help with sleep  Recommended mindfulness, meditation, and exercise. Sleep hygiene.   - hydrOXYzine HCl (ATARAX) 25 MG tablet  Dispense: 90 tablet; Refill: 3    Visit for screening mammogram  Order placed    Gastroesophageal reflux disease with esophagitis, unspecified whether hemorrhage  Stable. Needing med refill.   - famotidine (PEPCID) 20 MG tablet  Dispense: 60 tablet; Refill: 3    Hypertension, unspecified type  Controlled  Continue amlodipine 5 mg once daily  Discussed DASH diet and dietary sodium restrictions.  Continue/Increase dietary efforts and physical activity.  - Comprehensive metabolic panel (BMP + Alb, Alk Phos, ALT, AST, Total. Bili, TP)  - amLODIPine (NORVASC) 5 MG tablet  Dispense: 90 tablet; Refill: 3    Hypothyroidism, unspecified type  Not on any medication.  Recheck levels today.  - TSH with free T4 reflex    Pre-diabetes  HgA1c 1 year ago was 6.3%, today it is 6.2%. Recheck in 1 year.  - CBC with platelets  - Comprehensive metabolic panel (BMP + Alb, Alk Phos, ALT, AST, Total. Bili, TP)  - Hemoglobin A1c    Vaginal dryness, menopausal  Trial topical estrogen.   - estradiol (ESTRACE) 0.1 MG/GM vaginal cream  Dispense: 42.5 g; Refill: 3    Encounter for screening mammogram for breast cancer  - MA Screening Bilateral w/ Thomas    Insomnia, unspecified type  Continue hydroxyzine 25 mg nightly.         Patient has been advised of split billing requirements and indicates understanding: Yes      Nicotine/Tobacco Cessation  She reports that she has been smoking cigarettes. She has a 26.3 pack-year smoking history. She has been exposed to tobacco smoke. She has never used smokeless tobacco.  Nicotine/Tobacco Cessation Plan  Patient is currently working through her own smoking cessation plan.  "Plans to quit before her upcoming vacation.      BMI  Estimated body mass index is 25.38 kg/m  as calculated from the following:    Height as of this encounter: 1.588 m (5' 2.5\").    Weight as of this encounter: 64 kg (141 lb).   Weight management plan: no concerns with BMI today    Counseling  Appropriate preventive services were addressed with this patient via screening, questionnaire, or discussion as appropriate for fall prevention, nutrition, physical activity, Tobacco-use cessation, social engagement, weight loss and cognition.  Checklist reviewing preventive services available has been given to the patient.  Reviewed patient's diet, addressing concerns and/or questions.   The patient was instructed to see the dentist every 6 months.   Discussed possible causes of fatigue. The patient was provided with written information regarding signs of hearing loss.       Mellissa Sheffield is a 69 year old, presenting for the following:  Physical (Feeling tight in her chest and wheezy, would like refill of prednisone sent to [facility]. Hasn't had a sex drive in over a year and feels very dry vaginally. Has some moles and derm spots she would like checked out. )        9/16/2024     9:27 AM   Additional Questions   Roomed by Philly         [facility] Care Directive  Patient does not have a Health Care Directive or Living Will: Discussed advance care planning with patient; however, patient declined at this time.    HPI    COPD: With the smoke in the air her symptoms have been worse than normal. Feeling her chest is more tight right now and she is more wheezy. Usually symptoms are very well controlled. States her allergies also haven't been well. She feels her ellipta and albuterol inhaler work well. She is needing a refill of her prn prednisone. No fevers. She is still working on quiting smoking. She feels she will be completely done with this before she goes on an upcoming vacation.     Tolerating atorvastatin " once daily and tolerating this well with no known side effects.     HTN: Taking amlodipine once daily at home. Every once and awhile she will her BP at home. Denies any issue with blurry vision or headaches out of her norm. Needing med refill today.     On her feet a lot during the day. States her feet will ache. She does feel like she urinates frequently. States she is always thirsty.     Dealing with vaginal dryness.     Things with her mood are going well. Will use hydroxyzine prn for sleep.           9/16/2024   General Health   How would you rate your overall physical health? Good   Feel stress (tense, anxious, or unable to sleep) To some extent      (!) STRESS CONCERN      9/16/2024   Nutrition   Diet: Regular (no restrictions)            9/16/2024   Exercise   Days per week of moderate/strenous exercise 5 days            9/16/2024   Social Factors   Frequency of gathering with friends or relatives More than three times a week   Worry food won't last until get money to buy more No   Food not last or not have enough money for food? No   Do you have housing? (Housing is defined as stable permanent housing and does not include staying ouside in a car, in a tent, in an abandoned building, in an overnight shelter, or couch-surfing.) No   Are you worried about losing your housing? No   Lack of transportation? No   Unable to get utilities (heat,electricity)? No   Want help with housing or utility concern? No      (!) HOUSING CONCERN PRESENT      9/16/2024   Fall Risk   Fallen 2 or more times in the past year? No    No   Trouble with walking or balance? No    No       Multiple values from one day are sorted in reverse-chronological order          9/16/2024   Activities of Daily Living- Home Safety   Needs help with the following daily activites None of the above   Safety concerns in the home None of the above            9/16/2024   Dental   Dentist two times every year? (!) NO            9/16/2024   Hearing Screening    Hearing concerns? (!) I NEED TO ASK PEOPLE TO SPEAK UP OR REPEAT THEMSELVES.            9/16/2024   Driving Risk Screening   Patient/family members have concerns about driving No            9/16/2024   General Alertness/Fatigue Screening   Have you been more tired than usual lately? (!) YES            9/16/2024   Urinary Incontinence Screening   Bothered by leaking urine in past 6 months No            9/16/2024   TB Screening   Were you born outside of the [place]? No        Today's PHQ-9 Score:       9/16/2024     9:20 AM   PHQ-9 SCORE   PHQ-9 Total Score MyChart 4 (Minimal depression)   PHQ-9 Total Score 4         9/16/2024   Substance Use   Alcohol more than 3/day or more than 7/wk No   Do you have a current opioid prescription? No   How severe/bad is pain from 1 to 10? 1/10   Do you use any other substances recreationally? No        Social History     Tobacco Use    Smoking status: Every Day     Current packs/day: 0.75     Average packs/day: 0.8 packs/day for 35.0 years (26.3 ttl pk-yrs)     Types: Cigarettes     Passive exposure: Current    Smokeless tobacco: Never    Tobacco comments:     using patches in between; given smokiing cessation information   Vaping Use    Vaping status: Never Used   Substance Use Topics    Alcohol use: No     Comment: Alcoholic Drinks/day: 1 a year    Drug use: No           9/14/2023   LAST FHS-7 RESULTS   1st degree relative breast or ovarian cancer Yes   Any relative bilateral breast cancer Yes   Any male have breast cancer No   Any ONE woman have BOTH breast AND ovarian cancer Yes   Any woman with breast cancer before 50yrs No   2 or more relatives with breast AND/OR ovarian cancer No   2 or more relatives with breast AND/OR bowel cancer No           Mammogram Screening - Mammogram every 1-2 years updated in Health Maintenance based on mutual decision making      History of abnormal Pap smear: No - age 65 or older with adequate negative prior screening test results (3 consecutive  negative cytology results, 2 consecutive negative cotesting results, or 2 consecutive negative HrHPV test results within 10 years, with the most recent test occurring within the recommended screening interval for the test used)        Latest Ref Rng & Units 12/18/2017     3:50 PM 8/22/2014    12:02 PM   PAP / HPV   PAP  Negative for squamous intraepithelial lesion or malignancy  Electronically signed by Mabel Haji CT (ASCP) on 12/22/2017 at 12:05 PM    Negative for squamous intraepithelial lesion or malignancy  Electronically signed by Autumn Toussaint CT (ASCP) on 9/8/2014 at 11:21 AM      HPV 16 DNA NEG Negative     HPV 18 DNA NEG Negative     Other HR HPV NEG Negative       ASCVD Risk   The 10-year ASCVD risk score (Bertha HULL, et al., 2019) is: 16.4%    Values used to calculate the score:      Age: 69 years      Sex: Female      Is Non- : No      Diabetic: No      Tobacco smoker: Yes      Systolic Blood Pressure: 128 mmHg      Is BP treated: Yes      HDL Cholesterol: 50 mg/dL      Total Cholesterol: 207 mg/dL          Reviewed and updated as needed this visit by Provider                    BP Readings from Last 3 Encounters:   09/16/24 128/78   03/15/24 136/76   11/16/23 113/67    Wt Readings from Last 3 Encounters:   09/16/24 64 kg (141 lb)   03/15/24 65.8 kg (145 lb)   10/02/23 64 kg (141 lb)                  Current providers sharing in care for this patient include:  Patient Care Team:  Carina Locke MD as PCP - General (Family Medicine)  Shadi Oden PsyD as Assigned Behavioral Health Provider  Mara Mariee MD as MD (Cardiovascular Disease)  Carina Locke MD as Assigned PCP  Mara Mariee MD as Assigned Heart and Vascular Provider    The following health maintenance items are reviewed in Epic and correct as of today:  Health Maintenance   Topic Date Due    SPIROMETRY  Never done    COPD ACTION PLAN  Never done    DEPRESSION ACTION  "PLAN  Never done    RSV VACCINE (1 - Risk 60-74 years 1-dose series) Never done    DTAP/TDAP/TD IMMUNIZATION (2 - Td or Tdap) 05/04/2021    COVID-19 Vaccine (2 - Pfizer risk series) 11/17/2021    MAMMO SCREENING  11/09/2023    ANNUAL REVIEW OF HM ORDERS  12/14/2023    LIPID  05/10/2024    INFLUENZA VACCINE (1) 09/01/2024    NICOTINE/TOBACCO CESSATION COUNSELING Q 1 YR  09/14/2024    MEDICARE ANNUAL WELLNESS VISIT  09/14/2024    PHQ-9  03/16/2025    LUNG CANCER SCREENING  03/28/2025    FALL RISK ASSESSMENT  09/16/2025    Pneumococcal Vaccine: 65+ Years (3 of 3 - PPSV23 or PCV20) 10/26/2025    GLUCOSE  05/10/2026    COLORECTAL CANCER SCREENING  03/09/2028    ADVANCE CARE PLANNING  09/14/2028    DEXA  01/08/2033    ZOSTER IMMUNIZATION  Completed    HEPATITIS B IMMUNIZATION  Completed    HPV IMMUNIZATION  Aged Out    MENINGITIS IMMUNIZATION  Aged Out    RSV MONOCLONAL ANTIBODY  Aged Out    PAP  Discontinued       Review of Systems  Constitutional, neuro, ENT, endocrine, pulmonary, cardiac, gastrointestinal, genitourinary, musculoskeletal, integument and psychiatric systems are negative, except as otherwise noted.     Objective    Exam  /78   Pulse 75   Temp 97.7  F (36.5  C)   Resp 16   Ht 1.588 m (5' 2.5\")   Wt 64 kg (141 lb)   SpO2 95%   BMI 25.38 kg/m     Estimated body mass index is 25.38 kg/m  as calculated from the following:    Height as of this encounter: 1.588 m (5' 2.5\").    Weight as of this encounter: 64 kg (141 lb).    Physical Exam  GENERAL: alert and no distress  EYES: Eyes grossly normal to inspection, conjunctivae and sclerae normal  HENT: ear canals and TM's normal, nose and mouth without ulcers or lesions  NECK: no adenopathy, no asymmetry, masses, or scars  RESP: Diffuse wheezing throughout bilateral lung fields   CV: regular rate and rhythm, normal S1 S2, no S3 or S4, no murmur, click or rub, no peripheral edema  MS: no gross musculoskeletal defects noted, no edema  SKIN: no suspicious " lesions or rashes  NEURO: Normal strength and tone, mentation intact and speech normal  PSYCH: mentation appears normal, affect normal/bright         9/16/2024   Mini Cog   Clock Draw Score 2 Normal   3 Item Recall 3 objects recalled   Mini Cog Total Score 5                 Signed Electronically by: Latosha Frederick PA-C

## 2024-09-16 NOTE — PATIENT INSTRUCTIONS
Patient Education   Preventive Care Advice   This is general advice given by our system to help you stay healthy. However, your care team may have specific advice just for you. Please talk to your care team about your preventive care needs.  Nutrition  Eat 5 or more servings of fruits and vegetables each day.  Try wheat bread, brown rice and whole grain pasta (instead of white bread, rice, and pasta).  Get enough calcium and vitamin D. Check the label on foods and aim for 100% of the RDA (recommended daily allowance).  Lifestyle  Exercise at least 150 minutes each week  (30 minutes a day, 5 days a week).  Do muscle strengthening activities 2 days a week. These help control your weight and prevent disease.  No smoking.  Wear sunscreen to prevent skin cancer.  Have a dental exam and cleaning every 6 months.  Yearly exams  See your health care team every year to talk about:  Any changes in your health.  Any medicines your care team has prescribed.  Preventive care, family planning, and ways to prevent chronic diseases.  Shots (vaccines)   HPV shots (up to age 26), if you've never had them before.  Hepatitis B shots (up to age 59), if you've never had them before.  COVID-19 shot: Get this shot when it's due.  Flu shot: Get a flu shot every year.  Tetanus shot: Get a tetanus shot every 10 years.  Pneumococcal, hepatitis A, and RSV shots: Ask your care team if you need these based on your risk.  Shingles shot (for age 50 and up)  General health tests  Diabetes screening:  Starting at age 35, Get screened for diabetes at least every 3 years.  If you are younger than age 35, ask your care team if you should be screened for diabetes.  Cholesterol test: At age 39, start having a cholesterol test every 5 years, or more often if advised.  Bone density scan (DEXA): At age 50, ask your care team if you should have this scan for osteoporosis (brittle bones).  Hepatitis C: Get tested at least once in your life.  STIs (sexually  transmitted infections)  Before age 24: Ask your care team if you should be screened for STIs.  After age 24: Get screened for STIs if you're at risk. You are at risk for STIs (including HIV) if:  You are sexually active with more than one person.  You don't use condoms every time.  You or a partner was diagnosed with a sexually transmitted infection.  If you are at risk for HIV, ask about PrEP medicine to prevent HIV.  Get tested for HIV at least once in your life, whether you are at risk for HIV or not.  Cancer screening tests  Cervical cancer screening: If you have a cervix, begin getting regular cervical cancer screening tests starting at age 21.  Breast cancer scan (mammogram): If you've ever had breasts, begin having regular mammograms starting at age 40. This is a scan to check for breast cancer.  Colon cancer screening: It is important to start screening for colon cancer at age 45.  Have a colonoscopy test every 10 years (or more often if you're at risk) Or, ask your provider about stool tests like a FIT test every year or Cologuard test every 3 years.  To learn more about your testing options, visit:   .  For help making a decision, visit:   https://bit.ly/ly56398.  Prostate cancer screening test: If you have a prostate, ask your care team if a prostate cancer screening test (PSA) at age 55 is right for you.  Lung cancer screening: If you are a current or former smoker ages 50 to 80, ask your care team if ongoing lung cancer screenings are right for you.  For informational purposes only. Not to replace the advice of your health care provider. Copyright   2023 [facility] [email or web address]. All rights reserved. Clinically reviewed by the [facility] Transitions Program. [employer] 125800 - REV 01/24.  Hearing Loss: Care Instructions  Overview     Hearing loss is a sudden or slow decrease in how well you hear. It can range from slight to profound. Permanent hearing loss can occur with aging. It also can  happen when you are exposed long-term to loud noise. Examples include listening to loud music, riding motorcycles, or being around other loud machines.  Hearing loss can affect your work and home life. It can make you feel lonely or depressed. You may feel that you have lost your independence. But hearing aids and other devices can help you hear better and feel connected to others.  Follow-up care is a key part of your treatment and safety. Be sure to make and go to all appointments, and call your doctor if you are having problems. It's also a good idea to know your test results and keep a list of the medicines you take.  How can you care for yourself at home?  Avoid loud noises whenever possible. This helps keep your hearing from getting worse.  Always wear hearing protection around loud noises.  Wear a hearing aid as directed.  A professional can help you pick a hearing aid that will work best for you.  You can also get hearing aids over the counter for mild to moderate hearing loss.  Have hearing tests as your doctor suggests. They can show whether your hearing has changed. Your hearing aid may need to be adjusted.  Use other devices as needed. These may include:  Telephone amplifiers and hearing aids that can connect to a television, stereo, radio, or microphone.  Devices that use lights or vibrations. These alert you to the doorbell, a ringing telephone, or a baby monitor.  Television closed-captioning. This shows the words at the bottom of the screen. Most new TVs can do this.  TTY (text telephone). This lets you type messages back and forth on the telephone instead of talking or listening. These devices are also called TDD. When messages are typed on the keyboard, they are sent over the phone line to a receiving TTY. The message is shown on a monitor.  Use text messaging, social media, and email if it is hard for you to communicate by telephone.  Try to learn a listening technique called speechreading. It is  "not lipreading. You pay attention to people's gestures, expressions, posture, and tone of voice. These clues can help you understand what a person is saying. Face the person you are talking to, and have them face you. Make sure the lighting is good. You need to see the other person's face clearly.  Think about counseling if you need help to adjust to your hearing loss.  When should you call for help?  Watch closely for changes in your health, and be sure to contact your doctor if:    You think your hearing is getting worse.     You have new symptoms, such as dizziness or nausea.   Where can you learn more?  Go to https://www.[employer].net/patiented  Enter R798 in the search box to learn more about \"Hearing Loss: Care Instructions.\"  Current as of: September 27, 2023               Content Version: 14.0    6153-3830 [employer].   Care instructions adapted under license by your healthcare professional. If you have questions about a medical condition or this instruction, always ask your healthcare professional. [employer] disclaims any warranty or liability for your use of this information.      Learning About Sleeping Well  What does sleeping well mean?     Sleeping well means getting enough sleep to feel good and stay healthy. How much sleep is enough varies among people.  The number of hours you sleep and how you feel when you wake up are both important. If you do not feel refreshed, you probably need more sleep. Another sign of not getting enough sleep is feeling tired during the day.  Experts recommend that adults get at least 7 or more hours of sleep per day. Children and older adults need more sleep.  Why is getting enough sleep important?  Getting enough quality sleep is a basic part of good health. When your sleep suffers, your physical health, mood, and your thoughts can suffer too. You may find yourself feeling more grumpy or stressed. Not getting enough sleep also can lead to " "serious problems, including injury, accidents, anxiety, and depression.  What might cause poor sleeping?  Many things can cause sleep problems, including:  Changes to your sleep schedule.  Stress. Stress can be caused by fear about a single event, such as giving a speech. Or you may have ongoing stress, such as worry about work or school.  Depression, anxiety, and other mental or emotional conditions.  Changes in your sleep habits or surroundings. This includes changes that happen where you sleep, such as noise, light, or sleeping in a different bed. It also includes changes in your sleep pattern, such as having jet lag or working a late shift.  Health problems, such as pain, breathing problems, and restless legs syndrome.  Lack of regular exercise.  Using alcohol, nicotine, or caffeine before bed.  How can you help yourself?  Here are some tips that may help you sleep more soundly and wake up feeling more refreshed.  Your sleeping area   Use your bedroom only for sleeping and sex. A bit of light reading may help you fall asleep. But if it doesn't, do your reading elsewhere in the house. Try not to use your TV, computer, smartphone, or tablet while you are in bed.  Be sure your bed is big enough to stretch out comfortably, especially if you have a sleep partner.  Keep your bedroom quiet, dark, and cool. Use curtains, blinds, or a sleep mask to block out light. To block out noise, use earplugs, soothing music, or a \"white noise\" machine.  Your evening and bedtime routine   Create a relaxing bedtime routine. You might want to take a warm shower or bath, or listen to soothing music.  Go to bed at the same time every night. And get up at the same time every morning, even if you feel tired.  What to avoid   Limit caffeine (coffee, tea, caffeinated sodas) during the day, and don't have any for at least 6 hours before bedtime.  Avoid drinking alcohol before bedtime. Alcohol can cause you to wake up more often during the " "night.  Try not to smoke or use tobacco, especially in the evening. Nicotine can keep you awake.  Limit naps during the day, especially close to bedtime.  Avoid lying in bed awake for too long. If you can't fall asleep or if you wake up in the middle of the night and can't get back to sleep within about 20 minutes, get out of bed and go to another room until you feel sleepy.  Avoid taking medicine right before bed that may keep you awake or make you feel hyper or energized. Your doctor can tell you if your medicine may do this and if you can take it earlier in the day.  If you can't sleep   Imagine yourself in a peaceful, pleasant scene. Focus on the details and feelings of being in a place that is relaxing.  Get up and do a quiet or boring activity until you feel sleepy.  Avoid drinking any liquids before going to bed to help prevent waking up often to use the bathroom.  Where can you learn more?  Go to https://www.[employer].net/patiented  Enter J942 in the search box to learn more about \"Learning About Sleeping Well.\"  Current as of: July 10, 2023  Content Version: 14.1 2006-2024 [employer].   Care instructions adapted under license by your healthcare professional. If you have questions about a medical condition or this instruction, always ask your healthcare professional. [employer] disclaims any warranty or liability for your use of this information.       "

## 2024-09-16 NOTE — LETTER
September 17, 2024      Nettie LEI Humberto  [address] [facility] [address]        Dear ,    We are writing to inform you of your test results.    CBC shows normal cell counts. No signs of anemia or infection.   Your complete metabolic panel shows adequate electrolytes and normal kidney and liver function.   HgA1c is still within prediabetic range. Overall, improved at 6.2% from 6.3%. Please work on healthy diet (low carb) and exercise. We could consider sending you to see a diabetic educator if you'd like to discuss diet and lifestyle factors. Let me know if this is something you'd like to do.   Cholesterol panel is looking good. Continue your statin.   Thyroid function is within normal limits.     The 10-year ASCVD risk score (Bertha HULL, et al., 2019) is: 14.8%     Values used to calculate the score:       Age: 69 years       Sex: Female       Is Non- : No       Diabetic: No       Tobacco smoker: Yes       Systolic Blood Pressure: 128 mmHg       Is BP treated: Yes       HDL Cholesterol: 50 mg/dL       Total Cholesterol: 160 mg/dL     Resulted Orders   Lipid panel reflex to direct LDL Fasting   Result Value Ref Range    Cholesterol 160 <200 mg/dL    Triglycerides 88 <150 mg/dL    Direct Measure HDL 50 >=50 mg/dL    LDL Cholesterol Calculated 92 <100 mg/dL    Non HDL Cholesterol 110 <130 mg/dL    Patient Fasting > 8hrs? Yes     Narrative    Cholesterol  Desirable: < 200 mg/dL  Borderline High: 200 - 239 mg/dL  High: >= 240 mg/dL    Triglycerides  Normal: < 150 mg/dL  Borderline High: 150 - 199 mg/dL  High: 200-499 mg/dL  Very High: >= 500 mg/dL    Direct Measure HDL  Female: >= 50 mg/dL   Male: >= 40 mg/dL    LDL Cholesterol  Desirable: < 100 mg/dL  Above Desirable: 100 - 129 mg/dL   Borderline High: 130 - 159 mg/dL   High:  160 - 189 mg/dL   Very High: >= 190 mg/dL    Non HDL Cholesterol  Desirable: < 130 mg/dL  Above Desirable: 130 - 159 mg/dL  Borderline High: 160 -  189 mg/dL  High: 190 - 219 mg/dL  Very High: >= 220 mg/dL   CBC with platelets   Result Value Ref Range    WBC Count 9.4 4.0 - 11.0 10e3/uL    RBC Count 5.23 (H) 3.80 - 5.20 10e6/uL    Hemoglobin 15.0 11.7 - 15.7 g/dL    Hematocrit 45.4 35.0 - 47.0 %    MCV 87 78 - 100 fL    MCH 28.7 26.5 - 33.0 pg    MCHC 33.0 31.5 - 36.5 g/dL    RDW 12.8 10.0 - 15.0 %    Platelet Count 286 150 - 450 10e3/uL   Comprehensive metabolic panel (BMP + Alb, Alk Phos, ALT, AST, Total. Bili, TP)   Result Value Ref Range    Sodium 141 135 - 145 mmol/L    Potassium 4.9 3.4 - 5.3 mmol/L    Carbon Dioxide (CO2) 27 22 - 29 mmol/L    Anion Gap 8 7 - 15 mmol/L    Urea Nitrogen 15.2 8.0 - 23.0 mg/dL    Creatinine 0.68 0.51 - 0.95 mg/dL    GFR Estimate >90 >60 mL/min/1.73m2      Comment:      eGFR calculated using 2021 CKD-EPI equation.    Calcium 9.5 8.8 - 10.4 mg/dL      Comment:      Reference intervals for this test were updated on 7/16/2024 to reflect our healthy population more accurately. There may be differences in the flagging of prior results with similar values performed with this method. Those prior results can be interpreted in the context of the updated reference intervals.    Chloride 106 98 - 107 mmol/L    Glucose 103 (H) 70 - 99 mg/dL    Alkaline Phosphatase 79 40 - 150 U/L    AST 26 0 - 45 U/L    ALT 16 0 - 50 U/L    Protein Total 7.1 6.4 - 8.3 g/dL    Albumin 4.4 3.5 - 5.2 g/dL    Bilirubin Total 0.3 <=1.2 mg/dL    Patient Fasting > 8hrs? Yes    Hemoglobin A1c   Result Value Ref Range    Hemoglobin A1C 6.2 (H) 0.0 - 5.6 %      Comment:      Normal <5.7%   Prediabetes 5.7-6.4%    Diabetes 6.5% or higher     Note: Adopted from ADA consensus guidelines.   TSH with free T4 reflex   Result Value Ref Range    TSH 2.92 0.30 - 4.20 uIU/mL       If you have any questions or concerns, please call the clinic at the number listed above.       Sincerely,      Asya Frederick PA-C

## 2024-11-15 ENCOUNTER — HOSPITAL ENCOUNTER (EMERGENCY)
Facility: CLINIC | Age: 69
Discharge: HOME | End: 2024-11-16
Attending: EMERGENCY MEDICINE | Admitting: EMERGENCY MEDICINE
Payer: MEDICARE

## 2024-11-15 ENCOUNTER — APPOINTMENT (OUTPATIENT)
Dept: RADIOLOGY | Facility: CLINIC | Age: 69
End: 2024-11-15
Attending: EMERGENCY MEDICINE
Payer: MEDICARE

## 2024-11-15 DIAGNOSIS — L03.116 CELLULITIS OF LEFT FOOT: ICD-10-CM

## 2024-11-15 PROCEDURE — 73630 X-RAY EXAM OF FOOT: CPT | Mod: LT

## 2024-11-15 PROCEDURE — 99284 EMERGENCY DEPT VISIT MOD MDM: CPT

## 2024-11-15 PROCEDURE — 250N000011 HC RX IP 250 OP 636: Performed by: EMERGENCY MEDICINE

## 2024-11-15 PROCEDURE — 250N000013 HC RX MED GY IP 250 OP 250 PS 637: Performed by: EMERGENCY MEDICINE

## 2024-11-15 RX ORDER — CEPHALEXIN 500 MG/1
500 CAPSULE ORAL 4 TIMES DAILY
Qty: 28 CAPSULE | Refills: 0 | Status: SHIPPED | OUTPATIENT
Start: 2024-11-15 | End: 2024-11-22

## 2024-11-15 RX ORDER — HYDROCODONE BITARTRATE AND ACETAMINOPHEN 5; 325 MG/1; MG/1
1 TABLET ORAL ONCE
Status: COMPLETED | OUTPATIENT
Start: 2024-11-15 | End: 2024-11-15

## 2024-11-15 RX ORDER — HYDROCODONE BITARTRATE AND ACETAMINOPHEN 5; 325 MG/1; MG/1
1-2 TABLET ORAL EVERY 4 HOURS PRN
Qty: 18 TABLET | Refills: 0 | Status: SHIPPED | OUTPATIENT
Start: 2024-11-15 | End: 2024-11-18

## 2024-11-15 RX ORDER — ONDANSETRON 4 MG/1
4 TABLET, ORALLY DISINTEGRATING ORAL ONCE
Status: COMPLETED | OUTPATIENT
Start: 2024-11-15 | End: 2024-11-15

## 2024-11-15 RX ORDER — CEPHALEXIN 500 MG/1
500 CAPSULE ORAL ONCE
Status: COMPLETED | OUTPATIENT
Start: 2024-11-15 | End: 2024-11-15

## 2024-11-15 RX ADMIN — CEPHALEXIN 500 MG: 500 CAPSULE ORAL at 23:33

## 2024-11-15 RX ADMIN — ONDANSETRON 4 MG: 4 TABLET, ORALLY DISINTEGRATING ORAL at 23:14

## 2024-11-15 RX ADMIN — HYDROCODONE BITARTRATE AND ACETAMINOPHEN 1 TABLET: 5; 325 TABLET ORAL at 23:13

## 2024-11-15 ASSESSMENT — ACTIVITIES OF DAILY LIVING (ADL): ADLS_ACUITY_SCORE: 0

## 2024-11-15 ASSESSMENT — COLUMBIA-SUICIDE SEVERITY RATING SCALE - C-SSRS
6. HAVE YOU EVER DONE ANYTHING, STARTED TO DO ANYTHING, OR PREPARED TO DO ANYTHING TO END YOUR LIFE?: YES
1. IN THE PAST MONTH, HAVE YOU WISHED YOU WERE DEAD OR WISHED YOU COULD GO TO SLEEP AND NOT WAKE UP?: NO
2. HAVE YOU ACTUALLY HAD ANY THOUGHTS OF KILLING YOURSELF IN THE PAST MONTH?: NO

## 2024-11-16 VITALS
HEIGHT: 62 IN | SYSTOLIC BLOOD PRESSURE: 140 MMHG | TEMPERATURE: 97.9 F | DIASTOLIC BLOOD PRESSURE: 73 MMHG | BODY MASS INDEX: 25.79 KG/M2 | HEART RATE: 86 BPM | RESPIRATION RATE: 18 BRPM | OXYGEN SATURATION: 95 %

## 2024-11-16 NOTE — ED NOTES
Introduced self to patient. Whiteboard updated. Plan of care and length of time discussed with patient. Pain assessed.

## 2024-11-16 NOTE — ED TRIAGE NOTES
Sharp pain in left foot started a couple weeks ago, swelling in foot started 2 days ago and now pain is severe.  Denies injury.     Triage Assessment (Adult)       Row Name 11/15/24 1835          Triage Assessment    Airway WDL WDL        Respiratory WDL    Respiratory WDL WDL        Skin Circulation/Temperature WDL    Skin Circulation/Temperature WDL WDL        Cardiac WDL    Cardiac WDL WDL        Peripheral/Neurovascular WDL    Peripheral Neurovascular WDL WDL        Cognitive/Neuro/Behavioral WDL    Cognitive/Neuro/Behavioral WDL WDL

## 2024-11-16 NOTE — DISCHARGE INSTRUCTIONS
Follow-up with your orthopedic surgeon on Monday as previously arranged  Rest and elevate your foot as much as possible over the next couple days  Ibuprofen 600 mg every 6 hours as needed for pain  Use the crutches to help you ambulate comfortably for the next week.  You can step down on your foot as tolerated  Return to the emergency department for worsening problems or concerns

## 2024-11-16 NOTE — ED PROVIDER NOTES
EMERGENCY DEPARTMENT ENCOUnter      NAME: Nettie Paul  AGE: 69 year old female  YOB: 1955  MRN: [id number]  EVALUATION DATE & TIME: 11/15/2024 10:47 PM    PCP: Carina Locke    ED PROVIDER: Madelaine Wesley MD      Chief Complaint   Patient presents with    Foot Pain    Foot Swelling         FINAL IMPRESSION:  1. Cellulitis of left foot          ED COURSE & MEDICAL DECISION MAKING:      In summary, the patient is a 69-year-old female who presents to the emergency department for evaluation of left foot pain thought secondary to cellulitis.  There is no evidence of foreign body or bony injury.    22:55 Evaluated the patient.  Vicodin 1 tablet p.o. was administered for pain.  Keflex 500 mg p.o. was administered for initiation of antibiotic therapy for treatment of cellulitis.  2355-updated and discharged patient.    Medical Decision Making  Obtained supplemental history:Supplemental history obtained?: Documented in chart and Family Member/Significant Other  Reviewed external records: External records reviewed?: No  Care impacted by chronic illness:Heart Disease and Other: GERD  Did you consider but not order tests?: Work up considered but not performed and documented in chart, if applicable  Did you interpret images independently?: Independent interpretation of ECG and images noted in documentation, when applicable.  Consultation discussion with other provider:Did you involve another provider (consultant, MH, pharmacy, etc.)?: No  Discharge. I prescribed additional prescription strength medication(s) as charted. See documentation for any additional details.    MIPS: Not Applicable        At the conclusion of the encounter I discussed the results of all of the tests and the disposition. The questions were answered. The patient or family acknowledged understanding and was agreeable with the care plan.         MEDICATIONS GIVEN IN THE EMERGENCY:  Medications   cephALEXin (KEFLEX) capsule 500 mg  (500 mg Oral $Given 11/15/24 5601)   HYDROcodone-acetaminophen (NORCO) 5-325 MG per tablet 1 tablet (1 tablet Oral $Given 11/15/24 9929)   ondansetron (ZOFRAN ODT) ODT tab 4 mg (4 mg Oral $Given 11/15/24 3479)       NEW PRESCRIPTIONS STARTED AT TODAY'S ER VISIT  New Prescriptions    CEPHALEXIN (KEFLEX) 500 MG CAPSULE    Take 1 capsule (500 mg) by mouth 4 times daily for 7 days.    HYDROCODONE-ACETAMINOPHEN (NORCO) 5-325 MG TABLET    Take 1-2 tablets by mouth every 4 hours as needed for moderate to severe pain.          =================================================================    HPI        Nettie Paul is a 69 year old female with a pertinent history of COPD, dyslexia, GERD, , and Hep c w/o coma who presents to this ED by walking for evaluation of foot pain.    Patient reports she has had sharp pain in her feet at night for a while now, though it is not constant. Patient states it is really painful today and noticed some swelling last night on her left feet. She rates the pain at a 10/10 when touching the foot and states it is a sharp pain. Patient reports no injury to the legs. Patient states her feet were hurting more often so she got wider shoes to try. Patient does smoke.    Patient denies fever, chills, or any other complaints at this time.       REVIEW OF SYSTEMS     Constitutional:  Denies fever or chills  HENT:  Denies sore throat   Respiratory:  Denies cough or shortness of breath   Cardiovascular:  Denies chest pain or palpitations  GI:  Denies abdominal pain, nausea, or vomiting  Musculoskeletal:  Denies any new extremity pain   Skin:  Denies rash   Neurologic:  Denies headache, focal weakness or sensory changes    All other systems reviewed and are negative      PAST MEDICAL HISTORY:  Past Medical History:   Diagnosis Date    COPD (chronic obstructive pulmonary disease) (H)     Dyslexia, developmental     GERD (gastroesophageal reflux disease)     Hep C w/o coma, chronic (H)     had  "failed treatment, saw [facility]     (normal spontaneous vaginal delivery)        PAST SURGICAL HISTORY:  Past Surgical History:   Procedure Laterality Date    ARTHROSCOPY HIP      ARTHROSCOPY SHOULDER ROTATOR CUFF REPAIR Right     C/SECTION, LOW TRANSVERSE      ORIF FEMUR FRACTURE             CURRENT MEDICATIONS:    albuterol (PROAIR HFA/PROVENTIL HFA/VENTOLIN HFA) 108 (90 Base) MCG/ACT inhaler  albuterol (PROVENTIL) (2.5 MG/3ML) 0.083% neb solution  amLODIPine (NORVASC) 5 MG tablet  aspirin 81 MG EC tablet  atorvastatin (LIPITOR) 20 MG tablet  cephALEXin (KEFLEX) 500 MG capsule  estradiol (ESTRACE) 0.1 MG/GM vaginal cream  famotidine (PEPCID) 20 MG tablet  fluticasone (FLONASE) 50 MCG/ACT nasal spray  HYDROcodone-acetaminophen (NORCO) 5-325 MG tablet  hydrOXYzine HCl (ATARAX) 25 MG tablet  predniSONE (DELTASONE) 20 MG tablet  umeclidinium (INCRUSE ELLIPTA) 62.5 MCG/ACT inhaler        ALLERGIES:  Allergies   Allergen Reactions    Codeine Unknown    Gabapentin Other (See Comments)     \"feels spacey, woozy, dizzy, off-balance, not clear\"       FAMILY HISTORY:  Family History   Problem Relation Age of Onset    Cirrhosis Mother     Alcoholism Mother     Coronary Artery Disease Father     Alcoholism Father     Coronary Artery Disease Sister     Seizure Disorder Sister     Breast Cancer Sister     Hypertension Brother        SOCIAL HISTORY:   Social History     Socioeconomic History    Marital status: Single    Number of children: 3    Years of education: 10   Tobacco Use    Smoking status: Every Day     Current packs/day: 0.75     Average packs/day: 0.8 packs/day for 35.0 years (26.3 ttl pk-yrs)     Types: Cigarettes     Passive exposure: Current    Smokeless tobacco: Never    Tobacco comments:     using patches in between; given smokiing cessation information   Vaping Use    Vaping status: Never Used   Substance and Sexual Activity    Alcohol use: No     Comment: Alcoholic Drinks/day: 1 a year    Drug use: No    Sexual " activity: Not Currently     Partners: Male     Birth control/protection: None     Social Drivers of Health     Financial Resource Strain: Low Risk  (9/16/2024)    Financial Resource Strain     Within the past 12 months, have you or your family members you live with been unable to get utilities (heat, electricity) when it was really needed?: No   Food Insecurity: Low Risk  (9/16/2024)    Food Insecurity     Within the past 12 months, did you worry that your food would run out before you got money to buy more?: No     Within the past 12 months, did the food you bought just not last and you didn t have money to get more?: No   Transportation Needs: Low Risk  (9/16/2024)    Transportation Needs     Within the past 12 months, has lack of transportation kept you from medical appointments, getting your medicines, non-medical meetings or appointments, work, or from getting things that you need?: No   Physical Activity: Unknown (9/16/2024)    Exercise Vital Sign     Days of Exercise per Week: 5 days   Stress: Stress Concern Present (9/16/2024)    [employer] - Occupational Stress Questionnaire     Feeling of Stress : To some extent   Social Connections: Unknown (9/16/2024)    Social Connection and Isolation Panel [NHANES]     Frequency of Social Gatherings with Friends and Family: More than three times a week   Interpersonal Safety: Low Risk  (3/15/2024)    Interpersonal Safety     Do you feel physically and emotionally safe where you currently live?: Yes     Within the past 12 months, have you been hit, slapped, kicked or otherwise physically hurt by someone?: No     Within the past 12 months, have you been humiliated or emotionally abused in other ways by your partner or ex-partner?: No   Housing Stability: High Risk (9/16/2024)    Housing Stability     Do you have housing? : No     Are you worried about losing your housing?: No       VITALS:  Patient Vitals for the past 24 hrs:   BP Temp Temp  "src Pulse Resp SpO2 Height   11/15/24 2245 130/85 97.9  F (36.6  C) Oral 86 18 94 % 1.575 m (5' 2\")       PHYSICAL EXAM    Constitutional:  Well developed, Well nourished,  HENT:  Normocephalic, Atraumatic, Bilateral external ears normal, Oropharynx moist, Nose normal.   Neck:  Normal range of motion, No meningismus, No stridor.   Eyes:  EOMI, Conjunctiva normal, No discharge.   Respiratory:  Normal breath sounds, No respiratory distress, No wheezing, No chest tenderness.   Cardiovascular:  Normal heart rate, Normal rhythm, No murmurs  GI:  Soft, No tenderness, No guarding,  Musculoskeletal:  Neurovascularly intact distally, No edema,  tenderness left plantar distal medial foot, No cyanosis, Good range of motion in all major joints.   Integument:  Warm, Dry, erythema noted on left plantar distal medial foot with no fluctuance or drainage.  No open wounds.  Lymphatic:  No lymphadenopathy noted.   Neurologic:  Alert & oriented x 3, Normal motor function, Normal sensory function, No focal deficits noted.   Psychiatric:  Affect normal, Judgment normal, Mood normal.      LAB:  All pertinent labs reviewed and interpreted.  Results for orders placed or performed during the hospital encounter of 11/15/24   Foot XR, G/E 3 views, left    Impression    IMPRESSION:     No evidence of acute fracture or dislocation.     Joint spaces are preserved.     Posterior and plantar calcaneal enthesophytes.    Soft tissues grossly unremarkable.       RADIOLOGY:  I have independently reviewed and interpreted the above imaging, pending the final radiology read.  Foot XR, G/E 3 views, left   Final Result   IMPRESSION:       No evidence of acute fracture or dislocation.       Joint spaces are preserved.       Posterior and plantar calcaneal enthesophytes.      Soft tissues grossly unremarkable.                  I, Randal Boland, am serving as a scribe to document services personally performed by Dr. Wesley based on my observation and " the provider's statements to me. I, Madelaine Wesley MD attest that Randal Raymundoi is acting in a scribe capacity, has observed my performance of the services and has documented them in accordance with my direction.    Madelaine Wesley MD  Emergency Medicine  [facility] EMERGENCY ROOM  [address] [id number] [phone]  Dept: [phone]       Madelaine Wesley MD  11/16/24 0004

## 2024-11-18 ENCOUNTER — PATIENT OUTREACH (OUTPATIENT)
Dept: NURSING | Facility: CLINIC | Age: 69
End: 2024-11-18
Payer: MEDICARE

## 2024-11-18 NOTE — TELEPHONE ENCOUNTER
Transitions of Care Outreach  No chief complaint on file.      Most Recent Admission Date: 11/15/2024   Most Recent Admission Diagnosis:      Most Recent Discharge Date: 11/16/2024   Most Recent Discharge Diagnosis: Cellulitis of left foot - L03.116     Transitions of Care Assessment         Follow up Plan          Future Appointments   Date Time Provider Department Center   11/21/2024 11:00 AM Latosha Frederick PA-C [id number]       Outpatient Plan as outlined on AVS reviewed with patient.    For any urgent concerns, please contact our 24 hour nurse triage line: [phone] ([phone])       Naya Nicolas RN

## 2024-11-21 ENCOUNTER — OFFICE VISIT (OUTPATIENT)
Dept: FAMILY MEDICINE | Facility: CLINIC | Age: 69
End: 2024-11-21
Payer: MEDICARE

## 2024-11-21 ENCOUNTER — TELEPHONE (OUTPATIENT)
Dept: FAMILY MEDICINE | Facility: CLINIC | Age: 69
End: 2024-11-21

## 2024-11-21 VITALS
WEIGHT: 145 LBS | RESPIRATION RATE: 14 BRPM | OXYGEN SATURATION: 96 % | HEART RATE: 77 BPM | BODY MASS INDEX: 26.68 KG/M2 | TEMPERATURE: 98.8 F | HEIGHT: 62 IN | DIASTOLIC BLOOD PRESSURE: 88 MMHG | SYSTOLIC BLOOD PRESSURE: 130 MMHG

## 2024-11-21 DIAGNOSIS — M25.475 SWELLING OF FIRST METATARSOPHALANGEAL (MTP) JOINT OF LEFT FOOT: Primary | ICD-10-CM

## 2024-11-21 LAB
BASOPHILS # BLD AUTO: 0.1 10E3/UL (ref 0–0.2)
BASOPHILS NFR BLD AUTO: 1 %
EOSINOPHIL # BLD AUTO: 0.1 10E3/UL (ref 0–0.7)
EOSINOPHIL NFR BLD AUTO: 1 %
ERYTHROCYTE [DISTWIDTH] IN BLOOD BY AUTOMATED COUNT: 12.9 % (ref 10–15)
HCT VFR BLD AUTO: 46.3 % (ref 35–47)
HGB BLD-MCNC: 15.2 G/DL (ref 11.7–15.7)
IMM GRANULOCYTES # BLD: 0 10E3/UL
IMM GRANULOCYTES NFR BLD: 0 %
LYMPHOCYTES # BLD AUTO: 1.8 10E3/UL (ref 0.8–5.3)
LYMPHOCYTES NFR BLD AUTO: 20 %
MCH RBC QN AUTO: 28.7 PG (ref 26.5–33)
MCHC RBC AUTO-ENTMCNC: 32.8 G/DL (ref 31.5–36.5)
MCV RBC AUTO: 87 FL (ref 78–100)
MONOCYTES # BLD AUTO: 0.5 10E3/UL (ref 0–1.3)
MONOCYTES NFR BLD AUTO: 5 %
NEUTROPHILS # BLD AUTO: 6.8 10E3/UL (ref 1.6–8.3)
NEUTROPHILS NFR BLD AUTO: 74 %
PLATELET # BLD AUTO: 342 10E3/UL (ref 150–450)
RBC # BLD AUTO: 5.3 10E6/UL (ref 3.8–5.2)
WBC # BLD AUTO: 9.2 10E3/UL (ref 4–11)

## 2024-11-21 ASSESSMENT — PAIN SCALES - GENERAL: PAINLEVEL_OUTOF10: MODERATE PAIN (4)

## 2024-11-21 NOTE — PROGRESS NOTES
Assessment & Plan     Swelling of first metatarsophalangeal (MTP) joint of left foot  Patient seen in ED on 11/15/24 for left foot pain. Diagnosed with cellulitis and started on keflex. Patient seen on 11/18/24 by orthopedist who felt she had gout who started on her on a medrol dosepak and educated to stop the keflex. She was seen in the ED on 11/18/24 to r/o DVT which work up was negative for. It was noted she had mild leukocytosis - given this information she was encouraged to restart the keflex in addition to the prednisone. She has only been taking the keflex BID and only 1 - 4mg tablet of prednisone daily.  Symptoms are improving.  Mild warmth, erythema, and swelling to the left 1st MTP joint. She has remained afebrile.  Recheck of WBC today is 9.2 improved from 13.2 on 11/18/24. Given this information will have patient finish last 1-2 days of prescription, ok to continue taking only BID.   Patient to resume full dosage and course of prednisone. Educated this is ok.  Will check a uric acid level. If this is indeed gout, would recommend trial of colchicine in the future for abortive therapy given her history of prediabetes.  Gout education provided including dietary lifestyle changes.   The patient expressed understanding and was in agreement with the POC.  - CBC with platelets and differential  - Uric acid    The longitudinal plan of care for the diagnosis(es)/condition(s) as documented were addressed during this visit. Due to the added complexity in care, I will continue to support Nettie in the subsequent management and with ongoing continuity of care.    MED REC REQUIRED  Post Medication Reconciliation Status: patient was not discharged from an inpatient facility or TCU      Subjective   Nettie is a 69 year old, presenting for the following health issues:  ER F/U (11/18/24 cellulitis L foot. Pain still traveling up the leg. ) and Recheck Medication (Uric acid OTC?)        11/21/2024    10:53 AM  "  Additional Questions   Roomed by Sujata UGARTE LPN     HPI     Seen in the ED on 11/15/24 for left foot pain. Diagnosed with cellulitis and prescribed an antibiotic.   She was seen by [facility] on 11/18/24 and was told she had gout, not cellulitis. She was told to stop her antibiotic and start prednisone. She was also told to present to  to r/o DVT. DVT was ruled out on 11/18/24. Given her mild leukocytosis she was educated to continue her cephalexin in case there was an infectious component.     Has a familial history of gout and dad with history of a blood clot.     Continues to take her keflex but is only taking BID instead of QID.   She has only taken one 4mg tablet each day of the prednisone taper due to concerns about the dosing.    She feels symptoms are improving. The swelling is greatly improved. Skin redness is going down - her daughter had her outline the redness with a pen to monitor for spreading. Pain is decreased.     She denies any fevers or full body aches. Denies any chills within the past few days.      Review of Systems  Constitutional, HEENT, cardiovascular, pulmonary, gi and gu systems are negative, except as otherwise noted.      Objective    /88 (BP Location: Left arm, Patient Position: Sitting, Cuff Size: Adult Regular)   Pulse 77   Temp 98.8  F (37.1  C) (Oral)   Resp 14   Ht 1.575 m (5' 2\")   Wt 65.8 kg (145 lb)   SpO2 96%   BMI 26.52 kg/m    Body mass index is 26.52 kg/m .  Physical Exam   GENERAL: alert and no distress  EYES: Eyes grossly normal to inspection, conjunctivae and sclerae normal  NECK: no visualized asymmetry, masses, or scars  RESP: normal respiratory effort  SKIN: Minor redness and warmth to left foot 1st metatarsal joint. No open skin noted.  MUSCULOSKELETAL: Mild tenderness to left foot 1st metatarsal joint. Minor swelling. Slightly decreased ROM due to pain. Joint is not overly warm or erythematous.  PSYCH: mentation appears normal, affect " normal/bright      Results for orders placed or performed in visit on 11/21/24 (from the past 24 hours)   CBC with platelets and differential    Narrative    The following orders were created for panel order CBC with platelets and differential.  Procedure                               Abnormality         Status                     ---------                               -----------         ------                     CBC with platelets and d...[[id number]]                      In process                   Please view results for these tests on the individual orders.           Signed Electronically by: Latosha Frederick PA-C

## 2024-11-21 NOTE — TELEPHONE ENCOUNTER
Spoke to patient and relayed message from provider.  Patient verbalized understanding and agrees with plan.    ORALIA PalacioN RN  [facility]

## 2024-11-21 NOTE — PATIENT INSTRUCTIONS
Ok to finish the course of prednisone as prescribed.    I will be in touch when your lab work returns if I would like you to finish the course of antibiotics.

## 2024-11-21 NOTE — TELEPHONE ENCOUNTER
----- Message from Latosha Frederick sent at 11/21/2024 12:52 PM CST -----  Please call patient. Relay that WBC has come back to normal range which is good news. I'd like to her to finish her course of keflex (1-2 days worth), it is ok for her to only take this BID instead of QID. Uric acid level is pending.

## 2024-12-03 ENCOUNTER — OFFICE VISIT (OUTPATIENT)
Dept: FAMILY MEDICINE | Facility: CLINIC | Age: 69
End: 2024-12-03
Payer: MEDICARE

## 2024-12-03 VITALS
OXYGEN SATURATION: 97 % | HEART RATE: 76 BPM | DIASTOLIC BLOOD PRESSURE: 80 MMHG | TEMPERATURE: 98.4 F | WEIGHT: 146.5 LBS | BODY MASS INDEX: 26.96 KG/M2 | SYSTOLIC BLOOD PRESSURE: 136 MMHG | RESPIRATION RATE: 16 BRPM | HEIGHT: 62 IN

## 2024-12-03 DIAGNOSIS — M25.562 POSTERIOR LEFT KNEE PAIN: ICD-10-CM

## 2024-12-03 DIAGNOSIS — M25.50 MULTIPLE JOINT PAIN: Primary | ICD-10-CM

## 2024-12-03 DIAGNOSIS — R21 RASH AND NONSPECIFIC SKIN ERUPTION: ICD-10-CM

## 2024-12-03 PROCEDURE — 86140 C-REACTIVE PROTEIN: CPT

## 2024-12-03 PROCEDURE — 86038 ANTINUCLEAR ANTIBODIES: CPT

## 2024-12-03 PROCEDURE — G2211 COMPLEX E/M VISIT ADD ON: HCPCS

## 2024-12-03 PROCEDURE — 99214 OFFICE O/P EST MOD 30 MIN: CPT

## 2024-12-03 PROCEDURE — 87798 DETECT AGENT NOS DNA AMP: CPT

## 2024-12-03 PROCEDURE — 36415 COLL VENOUS BLD VENIPUNCTURE: CPT

## 2024-12-03 PROCEDURE — 86618 LYME DISEASE ANTIBODY: CPT

## 2024-12-03 ASSESSMENT — PAIN SCALES - GENERAL: PAINLEVEL_OUTOF10: MODERATE PAIN (4)

## 2024-12-03 ASSESSMENT — ENCOUNTER SYMPTOMS: BACK PAIN: 1

## 2024-12-03 NOTE — PROGRESS NOTES
Assessment & Plan     Multiple joint pain  On going in bilateral shoulders, knees, and elbows for several years now. Will check a lyme titer as patient has frequent exposure to ticks. Will check a CRP and AVA as well. Should these return normal, given the bilateral nature of several joints would recommend assessment with rheumatology.   - LYME DISEASE TOTAL ANTIBODIES WITH REFLEX TO CONFIRMATION  - Anti Nuclear Judith IgG by IFA with Reflex  - CRP, inflammation  - LYME DISEASE TOTAL ANTIBODIES WITH REFLEX TO CONFIRMATION  - Anti Nuclear Judith IgG by IFA with Reflex  - CRP, inflammation    Posterior left knee pain  On going for the last year. Worse with knee extension and activity. Pain is like a pulling. Given there is no change in the characteristics of the pain since her LLE DVT workup on 11/18 and that there is no LE swelling I do not think we need to proceed with repeat DVT workup. No neurological deficits noted on physical exam. I am suspicious the patient is dealing with some muscular tightness based upon the physical exam findings and the description of the pain. Recommend further assessment with orthopedics.   - Orthopedic  Referral    Rash and nonspecific skin eruption  Will occur intermittently over the past year. Will present as painful vesicles on bilateral buttocks which end up scabbing over and resolving. The painful and vesicular nature of the rash sounds like shingles, however, the bilateral nature of it does not match. I obtained a swab today to definitively rule out shingles today. Should this return negative would recommend follow up with dermatology.   - Varicella Zoster DNA PCR CSF or Skin Swab  - Varicella Zoster DNA PCR CSF or Skin Swab    The longitudinal plan of care for the diagnosis(es)/condition(s) as documented were addressed during this visit. Due to the added complexity in care, I will continue to support Nettie in the subsequent management and with ongoing continuity of  care.      Subjective   Nettie is a 69 year old, presenting for the following health issues:  Back Pain (Radiating down into left leg, feels like pulling sensation behind knee. ) and Derm Problem (Spotty rash on buttocks comes and goes x1 year. Family hx of psoriasis. When rash is present pain in left leg seems to be worse. )        12/3/2024    10:40 AM   Additional Questions   Roomed by Sujata UGARTE LPN     History of Present Illness       Back Pain:  She presents for follow up of back pain. Patient's back pain is a recurring problem.  Location of back pain:  Left lower back  Description of back pain: stabbing  Back pain spreads: left buttocks, left knee and left foot    Since patient first noticed back pain, pain is: always present, but gets better and worse  Does back pain interfere with her job:  Yes       She eats 0-1 servings of fruits and vegetables daily.She consumes 1 sweetened beverage(s) daily.She exercises with enough effort to increase her heart rate 10 to 19 minutes per day.  She exercises with enough effort to increase her heart rate 3 or less days per week.   She is taking medications regularly.     Treated many years ago for fibromyalgia. Was treated with a medication and states she didn't tolerate this well. It also caused her to gain weight.     Left leg pain - posterior knee which goes up to the buttocks. On going for 1 year now. Did have a work up to rule out LLL DVT back on 11/18. Reports there is no change in the pain or discomfort since that time. Denies any numbness and tingling. She denies any weakness in her legs. The pain is like a pulling. Can be worsened by movement of her back.     Does note bilateral joint pain in her shoulders, knees, and elbows. Pain is worse after activity or at night when she is trying to sleep. Pain worsens throughout the day. In bilateral legs will note a throbbing at times.     Noting intermittent rash on her buttocks for the past year or so. When she has the  "rash it will cause pain down her legs which she states is like a nerve pain. The rash will blister and be painful. The rash is always bilateral on her buttocks. She has had the shingles vaccine.       Review of Systems  Constitutional, HEENT, cardiovascular, pulmonary, gi and gu systems are negative, except as otherwise noted.      Objective    /80 (BP Location: Left arm, Patient Position: Sitting, Cuff Size: Adult Regular)   Pulse 76   Temp 98.4  F (36.9  C) (Oral)   Resp 16   Ht 1.575 m (5' 2\")   Wt 66.5 kg (146 lb 8 oz)   SpO2 97%   BMI 26.80 kg/m    Body mass index is 26.8 kg/m .  Physical Exam   GENERAL: alert and no distress  RESP: lungs clear to auscultation - no rales, rhonchi or wheezes  MS: LLE exam shows normal strength and muscle mass, no deformities, no erythema, induration, or nodules, no evidence of joint effusion, ROM of all joints is normal although noted pain with extension of the leg. Tenderness to the lateral left knee ascending up the IT band to the hip.  SKIN: on bilateral buttocks there are several scabbed areas evident of resolving blistered lesions.   Comprehensive back pain exam:  No tenderness, Range of motion not limited by pain, Lower extremity strength functional and equal on both sides, Lower extremity reflexes within normal limits bilaterally, and Lower extremity sensation normal and equal on both sides  PSYCH: mentation appears normal, affect normal/bright    No results found for this or any previous visit (from the past 24 hours).        Signed Electronically by: Latosha Frederick PA-C    "

## 2024-12-04 ENCOUNTER — PATIENT OUTREACH (OUTPATIENT)
Dept: CARE COORDINATION | Facility: CLINIC | Age: 69
End: 2024-12-04
Payer: MEDICARE

## 2024-12-04 LAB
ANA SER QL IF: NEGATIVE
B BURGDOR IGG+IGM SER QL: 0.02
CRP SERPL-MCNC: <3 MG/L
SPECIMEN TYPE: NORMAL
VZV DNA SPEC QL NAA+PROBE: NOT DETECTED
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
## 2024-12-12 ENCOUNTER — PATIENT OUTREACH (OUTPATIENT)
Dept: CARE COORDINATION | Facility: CLINIC | Age: 69
End: 2024-12-12

## 2024-12-12 ENCOUNTER — ANCILLARY PROCEDURE (OUTPATIENT)
Dept: GENERAL RADIOLOGY | Facility: CLINIC | Age: 69
End: 2024-12-12
Attending: PHYSICIAN ASSISTANT
Payer: MEDICARE

## 2024-12-12 ENCOUNTER — OFFICE VISIT (OUTPATIENT)
Dept: ORTHOPEDICS | Facility: CLINIC | Age: 69
End: 2024-12-12
Payer: MEDICARE

## 2024-12-12 VITALS — HEIGHT: 62 IN | BODY MASS INDEX: 26.98 KG/M2 | WEIGHT: 146.61 LBS

## 2024-12-12 DIAGNOSIS — Z87.39 HX OF GOUT: ICD-10-CM

## 2024-12-12 DIAGNOSIS — Z87.81 HX OF FRACTURE OF TIBIA: Chronic | ICD-10-CM

## 2024-12-12 DIAGNOSIS — J44.9 CHRONIC OBSTRUCTIVE PULMONARY DISEASE, UNSPECIFIED COPD TYPE (H): Chronic | ICD-10-CM

## 2024-12-12 DIAGNOSIS — F17.200 TOBACCO DEPENDENCE: ICD-10-CM

## 2024-12-12 DIAGNOSIS — M79.605 LEFT LEG PAIN: Primary | ICD-10-CM

## 2024-12-12 DIAGNOSIS — M25.562 POSTERIOR LEFT KNEE PAIN: ICD-10-CM
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
## 2024-12-12 NOTE — PATIENT INSTRUCTIONS
Thank you for allowing me to be part of your care team. My personal goal for your visit today is that you felt that I listened to you, you understood your diagnosis and treatment options and our staff/clinic met your expectations. We strive to provide you excellent care.  If you felt like your expectations were not met at your visit today or if you have further questions about your visit or care, please send me a [employer] message and I would be happy answer any questions or listen to your feedback.  If you do not have [employer], you can call [phone] to speak with me.      Today we discussed the underlying etiology/pathology of patient's   1. Left leg pain- posterior (suspect soft tissue dysfunction- glut, hamstring and calf)    2. Hx of fracture of tibia, right- compound fracture (age 50's with tibia IM and then hardware removal)    3. Tobacco dependence    4. Chronic obstructive pulmonary disease, unspecified COPD type (H)    5. Hx of gout        Today a shared decision making model was used. The patient's values and choices were respected. The following information represents what was discussed and decided upon by the provider and the patient.  -We discussed the patient has been dealing with some abnormal discomfort in the posterior aspect of her left lower extremity.  She demonstrates discomfort mainly largely behind the left knee and up into the hamstring region.  Today she voices some intermittent tingling of her left foot as well as occasional sharp pain in the plantar surface of her left foot.  - We reviewed her x-rays of her knees today which show age-appropriate degenerative changes of the medial compartment of both knees without evidence of high-grade arthritis.  There is postsurgical changes of the right tibia consistent with a history of her compound fracture of the right lower extremity with intramedullary rodding and subsequent hardware removal.  There is no evidence of high-grade posttraumatic  arthritis of her right knee.  - We reviewed her recent rheumatology blood workup including Lyme's and uric acid all within normal limits.  There is no physical exam evidence of a rheumatology condition at this time  -We reviewed her left lower extremity ultrasound which is negative for any clotting event  - We discussed differential diagnosis would be mild underlying symptomatic osteoarthritis as she has some slight medial joint line pain on both knees, soft tissue dysfunction of the posterior chain of the left leg involving the gluteal muscles, hamstrings and gastrocsoleus complex as well as possible lumbar radiculopathy as she describes again abnormal sensation of her left foot.  Lumbar radiculopathy would be an L5/S1 dermatome pattern if present but this is not reproducible on exam today.  - We discussed her numerous MSK injuries over the years including significant injury to her right shoulder requiring rehab for 2 years with possible humerus fracture and brachial plexus injury, history of back fractures as well as her right lower extremity compound fracture of the tibia.  I suspect that a majority of her MSK complaints are due to previous traumatic injuries and posttraumatic degenerative changes but I do recommend patient follow-up with a rheumatologist as referred by her PCP for assessment and recommendation.  - We discussed the patient has not tried any treatment in regards to her condition for the left lower extremity.  - We discussed the etiology of osteoarthritis as well as various treatment options for the mainstay of management of osteoarthritis including rest, activity modification, proper technique with ambulation of steps/stairs which is to lead with good leg going up and lead with painful/symptomatic leg going down,  maintaining healthy weight management due to the 1:4 rule for joint stress with excess weight, herbal supplementation such as turmeric to decrease inflammation , healthy nutrition, use  of oral NSAIDs (if not contraindicated due to patient being on chronic blood thinner medication or other medical conditions) with supplemental Tylenol up to 3000 mg daily, topical pain relievers such as Voltaren 1% gel to be applied 3 times a day to the area of pain, consideration for intra-articular cortisone injections, viscosupplementation or PRP injections, formal physical therapy for joint mobilization and strengthening and possible surgical consideration if all conservative treatments fail.   -Patient does state that when she takes Aleve which is very rarely it is effective at helping her symptoms.  We discussed that she could take Aleve 1 tablet every 12 hours as needed for discomfort.  - Patient will be referred to formal physical therapy for her left lower extremity to work on posterior chain rehab program of her gluteal muscles, hamstrings and gastrocsoleus complex.  - Patient will follow-up with me in 3 months for recheck.  - If patient does not respond to physical therapy I will proceed with ordering lumbar spine x-rays at that visit and start workup for possible lumbar radiculopathy.    - Appointment line phone number is [[phone]]     Naveen Garcia PA-C  [facility]    This note was completed in part using a voice recognition software, any grammatical or context distortion are unintentional and inherent to the software.

## 2024-12-30 ENCOUNTER — TELEPHONE (OUTPATIENT)
Dept: FAMILY MEDICINE | Facility: CLINIC | Age: 69
End: 2024-12-30
Payer: MEDICARE

## 2024-12-30 DIAGNOSIS — J44.9 CHRONIC OBSTRUCTIVE PULMONARY DISEASE, UNSPECIFIED COPD TYPE (H): Primary | ICD-10-CM

## 2024-12-30 NOTE — TELEPHONE ENCOUNTER
Order/Referral Request    Who is requesting: Patient is requesting     Orders being requested: Order for Nebulizer Machine     Reason service is needed/diagnosis: Asthma     When are orders needed by: ASAP    Has this been discussed with Provider: Yes    Does patient have a preference on a Group/Provider/Facility? [facility]     Does patient have an appointment scheduled?: No    Where to send orders: Mail to patient and also call patient when order is mailed.    Okay to leave a detailed message?: Yes at Cell number on file:    Telephone Information:   Mobile [phone]

## 2024-12-31 NOTE — TELEPHONE ENCOUNTER
Patient calling to relay RX for Neb to be sent to [facility] in [place], then [facility] will cover the cost.

## 2025-01-02 NOTE — TELEPHONE ENCOUNTER
Prescription sent for DME but it doesn't ask for a location. Please send prescription to patient or fax to wherever she needs it.

## 2025-01-02 NOTE — TELEPHONE ENCOUNTER
FYI - Status Update    Who is Calling: patient    Update: patient called back, she would like this faxed to [facility] at: [phone]    Phone number if needed for [employer]: [phone] ex. [phone]    Order at  has been faxed. No further action necessary at this time.

## 2025-01-02 NOTE — TELEPHONE ENCOUNTER
Pt notified. DME order printed for pt and placed at  for .    Rosalind Perez MA on 1/2/2025 at 12:29 PM

## 2025-01-04 ENCOUNTER — HOSPITAL ENCOUNTER (EMERGENCY)
Facility: CLINIC | Age: 70
Discharge: HOME | End: 2025-01-04
Payer: MEDICARE

## 2025-01-04 ENCOUNTER — APPOINTMENT (OUTPATIENT)
Dept: ULTRASOUND IMAGING | Facility: CLINIC | Age: 70
End: 2025-01-04
Payer: MEDICARE

## 2025-01-04 ENCOUNTER — APPOINTMENT (OUTPATIENT)
Dept: RADIOLOGY | Facility: CLINIC | Age: 70
End: 2025-01-04
Attending: EMERGENCY MEDICINE
Payer: MEDICARE

## 2025-01-04 VITALS
HEART RATE: 68 BPM | SYSTOLIC BLOOD PRESSURE: 134 MMHG | RESPIRATION RATE: 16 BRPM | DIASTOLIC BLOOD PRESSURE: 75 MMHG | OXYGEN SATURATION: 93 % | WEIGHT: 142 LBS | HEIGHT: 62 IN | BODY MASS INDEX: 26.13 KG/M2 | TEMPERATURE: 98.8 F

## 2025-01-04 DIAGNOSIS — M71.21 SYNOVIAL CYST OF RIGHT POPLITEAL SPACE: ICD-10-CM

## 2025-01-04 PROCEDURE — 250N000013 HC RX MED GY IP 250 OP 250 PS 637

## 2025-01-04 PROCEDURE — 73562 X-RAY EXAM OF KNEE 3: CPT | Mod: RT

## 2025-01-04 PROCEDURE — 93971 EXTREMITY STUDY: CPT | Mod: RT

## 2025-01-04 PROCEDURE — 99284 EMERGENCY DEPT VISIT MOD MDM: CPT | Mod: 25

## 2025-01-04 PROCEDURE — 250N000011 HC RX IP 250 OP 636

## 2025-01-04 RX ORDER — OXYCODONE HYDROCHLORIDE 5 MG/1
5 TABLET ORAL ONCE
Status: COMPLETED | OUTPATIENT
Start: 2025-01-04 | End: 2025-01-04

## 2025-01-04 RX ORDER — ONDANSETRON 4 MG/1
4 TABLET, ORALLY DISINTEGRATING ORAL ONCE
Status: COMPLETED | OUTPATIENT
Start: 2025-01-04 | End: 2025-01-04

## 2025-01-04 RX ORDER — OXYCODONE HYDROCHLORIDE 5 MG/1
5 TABLET ORAL EVERY 6 HOURS PRN
Qty: 8 TABLET | Refills: 0 | Status: SHIPPED | OUTPATIENT
Start: 2025-01-04 | End: 2025-01-07

## 2025-01-04 RX ADMIN — ONDANSETRON 4 MG: 4 TABLET, ORALLY DISINTEGRATING ORAL at 15:16

## 2025-01-04 RX ADMIN — OXYCODONE 5 MG: 5 TABLET ORAL at 15:16

## 2025-01-04 ASSESSMENT — COLUMBIA-SUICIDE SEVERITY RATING SCALE - C-SSRS
1. IN THE PAST MONTH, HAVE YOU WISHED YOU WERE DEAD OR WISHED YOU COULD GO TO SLEEP AND NOT WAKE UP?: NO
2. HAVE YOU ACTUALLY HAD ANY THOUGHTS OF KILLING YOURSELF IN THE PAST MONTH?: NO
6. HAVE YOU EVER DONE ANYTHING, STARTED TO DO ANYTHING, OR PREPARED TO DO ANYTHING TO END YOUR LIFE?: NO

## 2025-01-04 NOTE — DISCHARGE INSTRUCTIONS
I have attached information regarding Baker's cyst to your paperwork.  For the pain, please use Tylenol, ibuprofen, the oxycodone I have provided you as well as plenty of ice.  When you are icing, please elevate the knee with pillows and place the ice on the back of the knee where the cyst is.  I have also provided you with a referral to orthopedics as they may want to drain the cyst if it is causing too much pain.  In the meantime, please take plenty of rest and decreased range of motion of the knee as this could exacerbate pain.  Please return to the emergency department for any new or worsening symptoms.    In terms of the medications, you can take oxycodone every 6 hours as needed.  In between these doses, please take Tylenol or ibuprofen for pain.  Please do not take any other medications with the oxycodone for pain.     For your pain we recommend trying;  Tylenol 1,000mg every 6 hours (as needed), up to 4,000mg/day  Ibuprofen 600 to 800mg every 6 hours (as needed), up to 3,200mg/day

## 2025-01-04 NOTE — ED TRIAGE NOTES
"Pt was getting into a truck yesterday when she felt her right knee \"Pop.\"  History of arthritis with past knee surgery.  C/O pain with any bending of the knee. Took Ibuprofen last night without relief.     Triage Assessment (Adult)       Row Name 01/04/25 1318          Triage Assessment    Airway WDL WDL        Respiratory WDL    Respiratory WDL WDL        Skin Circulation/Temperature WDL    Skin Circulation/Temperature WDL WDL        Cardiac WDL    Cardiac WDL WDL     Cardiac Rhythm NSR        Peripheral/Neurovascular WDL    Peripheral Neurovascular WDL WDL        Cognitive/Neuro/Behavioral WDL    Cognitive/Neuro/Behavioral WDL WDL                     "

## 2025-01-06 ENCOUNTER — MEDICAL CORRESPONDENCE (OUTPATIENT)
Dept: HEALTH INFORMATION MANAGEMENT | Facility: CLINIC | Age: 70
End: 2025-01-06
Payer: MEDICARE

## 2025-01-06 ENCOUNTER — PATIENT OUTREACH (OUTPATIENT)
Dept: CARE COORDINATION | Facility: CLINIC | Age: 70
End: 2025-01-06
Payer: MEDICARE

## 2025-01-06 NOTE — PROGRESS NOTES
Clinic Care Coordination Contact  Community Health Worker Initial Outreach    CHW Initial Information Gathering:  Referral Source: ED Follow-Up  Current living arrangement:: Not Assessed  CHW Additional Questions  If ED/Hospital discharge, follow-up appointment scheduled as recommended?: Yes  Medication changes made following ED/Hospital discharge?: No  MyChart active?: No    Patient accepts CC: No, declined. Patient will be sent Care Coordination introduction letter for future reference.     Missy Leary [facility]  Care Coordination

## 2025-01-13 ENCOUNTER — PATIENT OUTREACH (OUTPATIENT)
Dept: CARE COORDINATION | Facility: CLINIC | Age: 70
End: 2025-01-13
Payer: MEDICARE

## 2025-01-14 ENCOUNTER — NURSE TRIAGE (OUTPATIENT)
Dept: FAMILY MEDICINE | Facility: CLINIC | Age: 70
End: 2025-01-14

## 2025-01-14 NOTE — TELEPHONE ENCOUNTER
Medication Question or Refill        What medication are you calling about (include dose and sig)?: Prednisone and an Antibiotic    Preferred Pharmacy:   [id number] - [place], [place] - [address] AT [facility] [address]  [place] [address]  Phone: [phone] Fax: [phone]      Controlled Substance Agreement on file:   CSA -- Patient Level:    CSA: None found at the patient level.       Who prescribed the medication?: N/A, new med    Do you need a refill? New med request    When did you use the medication last? N/A    Patient offered an appointment? Yes: Scheduled for 1/17/25    Do you have any questions or concerns?  Yes: Pt thinks she has strep      Okay to leave a detailed message?: Yes at Cell number on file:    Telephone Information:   Mobile [phone]     Pt late for appointment but is requesting prednisone and an antibiotic. Pt states she thinks she is getting strep.  Pt would like phone call.     Surekha Coombs CMA.

## 2025-01-15 ENCOUNTER — VIRTUAL VISIT (OUTPATIENT)
Dept: FAMILY MEDICINE | Facility: CLINIC | Age: 70
End: 2025-01-15
Payer: MEDICARE

## 2025-01-15 DIAGNOSIS — M25.561 ACUTE PAIN OF RIGHT KNEE: ICD-10-CM

## 2025-01-15 DIAGNOSIS — J44.9 CHRONIC OBSTRUCTIVE PULMONARY DISEASE, UNSPECIFIED COPD TYPE (H): Primary | ICD-10-CM

## 2025-01-15 DIAGNOSIS — J44.1 COPD EXACERBATION (H): ICD-10-CM

## 2025-01-15 PROCEDURE — 98014 SYNCH AUDIO-ONLY EST MOD 30: CPT | Performed by: NURSE PRACTITIONER

## 2025-01-15 RX ORDER — AZITHROMYCIN 250 MG/1
TABLET, FILM COATED ORAL
Qty: 6 TABLET | Refills: 0 | Status: SHIPPED | OUTPATIENT
Start: 2025-01-15 | End: 2025-01-20

## 2025-01-15 RX ORDER — PREDNISONE 10 MG/1
TABLET ORAL
Qty: 30 TABLET | Refills: 0 | Status: SHIPPED | OUTPATIENT
Start: 2025-01-15

## 2025-01-15 NOTE — TELEPHONE ENCOUNTER
Latosha is out, but I am a bit ahead of schedule.  See if she is able to do a quick phone visit with me so I can ask more questions and make sure treatment is appropriate.    Eric Oliveira, NICOLAS

## 2025-01-15 NOTE — TELEPHONE ENCOUNTER
"Nurse Triage SBAR    Is this a 2nd Level Triage? YES, LICENSED PRACTITIONER REVIEW IS REQUIRED    Situation: Patient complaining of cough, sore throat, headache, congestion.    Background: Patient was watching grandson who was positive for strep, she now has a sore throat as well. She has hx of COPD and is having trouble sleeping at night due to cough, she has dark yellow sputum she is coughing up. Utilizing nebs and inhalers more at night.     Assessment: See triage    Protocol Recommended Disposition:   See in Office Today    Recommendation: No appointments available today. Patient is asking provider to weigh in, she does not want to go to /[facility] as she is \"hoping provider will send in an antibiotic for me and follow up on the 17th that I have scheduled\".     Routed to provider    Does the patient meet one of the following criteria for ADS visit consideration? 16+ years old, with an [facility] PCP     TIP  Providers, please consider if this condition is appropriate for management at one of our Acute and Diagnostic Services sites.     If patient is a good candidate, please use dotphrase <dot>triageresponse and select Refer to ADS to document.   "

## 2025-01-15 NOTE — PROGRESS NOTES
"Nettie is a 69 year old who is being evaluated via a billable telephone visit.    {ROOMING STAFF complete during rooming of virtual visit (Optional):218497}  Originating Location (pt. Location): {patient location:234950::\"Home\"}    Distant Location (provider location):  {virtual location provider:306538}  Telephone visit completed due to {audio only reason:221429}    {PROVIDER CHARTING PREFERENCE:620846}    Subjective   Nettie is a 69 year old, presenting for the following health issues:  COPD      1/15/2025     3:06 PM   Additional Questions   Roomed by Lala Pickett CMA     HPI     Phlegm yellow and dark. Cough. Wheezing.      {ROS Picklists (Optional):136439}      Objective           Vitals:  No vitals were obtained today due to virtual visit.    Physical Exam   General: Alert and no distress //Respiratory: No audible wheeze, cough, or shortness of breath // Psychiatric:  Appropriate affect, tone, and pace of words      {Diagnostic Test Results (Optional):724261}      Phone call duration: 14 minutes  Signed Electronically by: Eric Oliveira NP  {Email feedback regarding this note to [email or web address]   :820183}  " and gu systems are negative, except as otherwise noted.      Objective           Vitals:  No vitals were obtained today due to virtual visit.    Physical Exam   General: Alert and no distress //Respiratory: No audible wheeze, cough, or shortness of breath // Psychiatric:  Appropriate affect, tone, and pace of words            Phone call duration: 14 minutes  Signed Electronically by: Eric Oliveira NP

## 2025-01-15 NOTE — TELEPHONE ENCOUNTER
Naya Nicolas RN called Nettie on 1/15 to triage for possible strep.  Was unable to leave a message, the phone rang and then stopped with no voice mail option.       If patient calls back, please route to [facility].    TC please route to RN.    LAKIA Palacio RN  [facility]

## 2025-01-16 DIAGNOSIS — J44.9 COPD (CHRONIC OBSTRUCTIVE PULMONARY DISEASE) (H): Primary | ICD-10-CM

## 2025-01-27 ENCOUNTER — PATIENT OUTREACH (OUTPATIENT)
Dept: CARE COORDINATION | Facility: CLINIC | Age: 70
End: 2025-01-27
Payer: MEDICARE

## 2025-01-30 ENCOUNTER — OFFICE VISIT (OUTPATIENT)
Dept: ORTHOPEDICS | Facility: CLINIC | Age: 70
End: 2025-01-30
Payer: MEDICARE

## 2025-01-30 VITALS — HEIGHT: 62 IN | WEIGHT: 147 LBS | BODY MASS INDEX: 27.05 KG/M2

## 2025-01-30 DIAGNOSIS — M25.561 ACUTE PAIN OF RIGHT KNEE: Primary | ICD-10-CM

## 2025-01-30 RX ORDER — LIDOCAINE HYDROCHLORIDE 10 MG/ML
7 INJECTION, SOLUTION INFILTRATION; PERINEURAL
Status: COMPLETED | OUTPATIENT
Start: 2025-01-30 | End: 2025-01-30

## 2025-01-30 RX ORDER — TRIAMCINOLONE ACETONIDE 40 MG/ML
40 INJECTION, SUSPENSION INTRA-ARTICULAR; INTRAMUSCULAR
Status: COMPLETED | OUTPATIENT
Start: 2025-01-30 | End: 2025-01-30

## 2025-01-30 RX ADMIN — TRIAMCINOLONE ACETONIDE 40 MG: 40 INJECTION, SUSPENSION INTRA-ARTICULAR; INTRAMUSCULAR at 11:14

## 2025-01-30 RX ADMIN — LIDOCAINE HYDROCHLORIDE 7 ML: 10 INJECTION, SOLUTION INFILTRATION; PERINEURAL at 11:14

## 2025-01-30 NOTE — LETTER
1/30/2025      Nettie Paul  [address]  [place] [address]      Dear Colleague,    Thank you for referring your patient, Nettie Paul, to the [facility]. Please see a copy of my visit note below.    CC: Right knee pain     HPI: Patient is a 69-year-old female seen here today for evaluation of subacute right knee pain.  She injured her knee approximately 3 weeks ago while getting out of her truck.  She describes it as a twisting injury.  Since that time she has had anterior and medial knee pain.  She describes crepitus and pain underneath her patella with squatting and stairs.  She describes medial sided knee pain with twisting.  She was seen at a local ER where an ultrasound revealed a Baker's cyst and she was placed in a knee immobilizer at that time.  She is weaned out of her knee immobilizer.  She is to leave as needed for the pain.  She has not started any formal physical therapy.  She has not had a prior injection.  She has a history of a right tibial nail with subsequent hardware removal many years ago.    She takes medication for COPD and high cholesterol.  Her BMI is 26.  Last hemoglobin A1c is 6.2.  She is retired.  She smokes a quart a pack a cigarettes per day.  She enjoys taking care of her 5 acre property for exercise and hobbies.         Patient Active Problem List   Diagnosis     Hyperlipidemia     Hypothyroidism     Vitamin D deficiency     Tobacco dependence     COPD (chronic obstructive pulmonary disease) (H)     Anxiety     Insomnia     Alcohol abuse     Migraine     Osteopenia of multiple sites     Right shoulder pain     Hx of hepatitis C     Recurrent major depressive disorder     Hx of fracture of tibia, right- compound fracture (age 50's with tibia IM and then hardware removal)     Hx of gout          Past Medical History:   Diagnosis Date     COPD (chronic obstructive pulmonary disease) (H)      Dyslexia, developmental      GERD  "(gastroesophageal reflux disease)      Hep C w/o coma, chronic (H)     had failed treatment, saw ANGEL LUIS      (normal spontaneous vaginal delivery)           Past Surgical History:   Procedure Laterality Date     ARTHROSCOPY HIP       ARTHROSCOPY SHOULDER ROTATOR CUFF REPAIR Right      C/SECTION, LOW TRANSVERSE       ORIF FEMUR FRACTURE            Current Outpatient Medications   Medication Sig Dispense Refill     albuterol (PROAIR HFA/PROVENTIL HFA/VENTOLIN HFA) 108 (90 Base) MCG/ACT inhaler INHALE 2 PUFFS BY MOUTH EVERY 6 HOURS AS NEEDED FOR WHEEZING 18 g 11     albuterol (PROVENTIL) (2.5 MG/3ML) 0.083% neb solution Take 1 vial (2.5 mg) by nebulization every 4 hours as needed for shortness of breath or wheezing. 75 mL 3     amLODIPine (NORVASC) 5 MG tablet Take 1 tablet (5 mg) by mouth daily. 90 tablet 3     aspirin 81 MG EC tablet Take 1 tablet (81 mg) by mouth daily       atorvastatin (LIPITOR) 20 MG tablet Take 0.5 tablets (10 mg) by mouth daily. 90 tablet 3     fluticasone (FLONASE) 50 MCG/ACT nasal spray Spray 2 sprays into both nostrils daily. 16 g 4     hydrOXYzine HCl (ATARAX) 25 MG tablet Take 1 tablet (25 mg) by mouth at bedtime. 90 tablet 3     predniSONE (DELTASONE) 10 MG tablet 4 tabs for 3 days. 3 tabs for 3 days. 2 tabs for 3 days. 1 tab for 3 days. 30 tablet 0     umeclidinium (INCRUSE ELLIPTA) 62.5 MCG/ACT inhaler INHALE 1 PUFF BY MOUTH EVERY DAY - (DISCARD INHALER 6 WEEKS AFTER OPENING FOIL TRAY OR WHEN EMPTY) Strength: 62.5 MCG/INH 3 each 3          Allergies   Allergen Reactions     Codeine Unknown     Gabapentin Other (See Comments)     \"feels spacey, woozy, dizzy, off-balance, not clear\"          Family History   Problem Relation Age of Onset     Cirrhosis Mother      Alcoholism Mother      Coronary Artery Disease Father      Alcoholism Father      Coronary Artery Disease Sister      Seizure Disorder Sister      Breast Cancer Sister      Hypertension Brother           Social History "     Tobacco Use     Smoking status: Former     Current packs/day: 0.75     Average packs/day: 0.8 packs/day for 35.0 years (26.3 ttl pk-yrs)     Types: Cigarettes     Passive exposure: Past     Smokeless tobacco: Never     Tobacco comments:     using patches in between; given smokiing cessation information   Substance Use Topics     Alcohol use: No     Comment: Alcoholic Drinks/day: 1 a year            Objective:  Physical Exam:  RLE: No pain with axial load or logroll of the hip.  No open wounds, lacerations, or prior surgical incisions about the knee.  No significant edema or ecchymosis.  No erythema or drainage.  Grade 1 effusion of the knee joint.  No pain to palpation over the quad tendon patella or patellar tendon.  Patellar grind positive for pain and crepitus.  Mild pain to palpation over the medial or lateral joint line.  No pain to palpation over the femoral origin or tibial insertion of the MCL.  No pain to palpation over the femoral origin or fibular insertion of the LCL.  Palpable popliteal cyst in the posterior joint line.  Passive range of motion 0 to 140 degrees of knee flexion.  Active range of motion is equivalent to passive range of motion.  5/5 strength in flexion and extension.  Stable to varus and valgus stress at 0 and 30 degrees of knee flexion with firm endpoint.  Negative Lachman.  Stable anterior posterior drawer.  Moustapha mildly positive for medial sided pain. Grossly neurovascular intact.    Imaging:  3 view x-ray the right knee from January 4, 2025 was reviewed.  This shows moderate narrowing of the medial joint space with.  The 50% joint space maintained.  Small medial femoral osteophyte.  Moderate narrowing of the lateral patellofemoral joint space.  Well-maintained lateral joint space.    Procedure:   Written informed consent for Right knee intra-articular injection was obtained from the patient after discussing the risk and benefits of the procedure.  Risk and benefits including  but not limited to bleeding, infection, failure to cure pain and allergic reaction were discussed.  The anterior inferolateral portal was identified by palpation of bony landmarks.  The skin was cleaned with iodine solution.  Under sterile technique the anterior inferolateral portal was utilized to inject the entirety of the steroid and lidocaine solution.  Soft dressings were applied.  Patient tolerated the procedure without difficulty.  I counseled the patient on signs and symptoms of allergic reaction and infection.     Large Joint Injection/Arthocentesis: R knee joint    Date/Time: 1/30/2025 11:14 AM    Performed by: Emmett Henry MD  Authorized by: Emmett Henry MD    Indications:  Pain  Needle Size:  22 G  Guidance: landmark guided    Approach:  Anterolateral  Location:  Knee      Medications:  40 mg triamcinolone 40 MG/ML; 7 mL lidocaine 1 %  Outcome:  Tolerated well, no immediate complications  Procedure discussed: discussed risks, benefits, and alternatives    Consent Given by:  Patient  Timeout: timeout called immediately prior to procedure    Prep: patient was prepped and draped in usual sterile fashion          Assessment and Plan: Patient is a 69-year-old female seen here today for evaluation of right knee pain.  She injured the knee approximate 3 weeks ago.  X-rays show mild to moderate medial patellofemoral arthritis.  Discussed with her today expected her symptoms are coming from early stage arthritis in her patellofemoral and medial joint space.  Certainly she is likely to also have a small meniscus tear on the medial side of her knee.  Discussed treatment options.  Discussed nonoperative management is the first-line treatment for both his diagnosis.  Discussed nonoperative management in form of oral anti-inflammatory medication, activity modification, the role of physical therapy, bracing, the role of intra-articular injection.  I discussed with her that if nonoperative management does not  provide symptomatic relief, she may be a candidate for an arthroscopic procedure, would certainly need an MRI to confirm diagnosis prior to that.  I did obtain answer questions.    At this time she would like to start with nonoperative measures.  I think that is very reasonable.  We discussed the risks and benefits of an intra-articular injection including but limited to allergic reaction, failure to cure pain, and infection.  I counseled on signs and symptoms of allergic reaction and infection. a right knee intra-articular corticosteroid injection was performed in clinic as highlighted above.  She tolerated the procedure without difficulty.  She was fitted for a hinged range of motion brace today.  Will start physical therapy for active and passive range of motion as well as core quad and hamstring strengthening.  She will follow-up with me in 6 to 8 weeks in clinic.    Emmett Henry MD    [facility]   Department of Orthopedic Surgery      Disclaimer: This note consists of symbols derived from keyboarding, dictation and/or voice recognition software. As a result, there may be errors in the script that have gone undetected. Please consider this when interpreting information found in this chart.         Again, thank you for allowing me to participate in the care of your patient.        Sincerely,        Emmett Henry MD    Electronically signed

## 2025-01-30 NOTE — PROGRESS NOTES
CC: Right knee pain     HPI: Patient is a 69-year-old female seen here today for evaluation of subacute right knee pain.  She injured her knee approximately 3 weeks ago while getting out of her truck.  She describes it as a twisting injury.  Since that time she has had anterior and medial knee pain.  She describes crepitus and pain underneath her patella with squatting and stairs.  She describes medial sided knee pain with twisting.  She was seen at a local ER where an ultrasound revealed a Baker's cyst and she was placed in a knee immobilizer at that time.  She is weaned out of her knee immobilizer.  She is to leave as needed for the pain.  She has not started any formal physical therapy.  She has not had a prior injection.  She has a history of a right tibial nail with subsequent hardware removal many years ago.    She takes medication for COPD and high cholesterol.  Her BMI is 26.  Last hemoglobin A1c is 6.2.  She is retired.  She smokes a quart a pack a cigarettes per day.  She enjoys taking care of her 5 acre property for exercise and hobbies.         Patient Active Problem List   Diagnosis    Hyperlipidemia    Hypothyroidism    Vitamin D deficiency    Tobacco dependence    COPD (chronic obstructive pulmonary disease) (H)    Anxiety    Insomnia    Alcohol abuse    Migraine    Osteopenia of multiple sites    Right shoulder pain    Hx of hepatitis C    Recurrent major depressive disorder    Hx of fracture of tibia, right- compound fracture (age 50's with tibia IM and then hardware removal)    Hx of gout          Past Medical History:   Diagnosis Date    COPD (chronic obstructive pulmonary disease) (H)     Dyslexia, developmental     GERD (gastroesophageal reflux disease)     Hep C w/o coma, chronic (H)     had failed treatment, saw [facility]     (normal spontaneous vaginal delivery)           Past Surgical History:   Procedure Laterality Date    ARTHROSCOPY HIP      ARTHROSCOPY SHOULDER ROTATOR CUFF REPAIR Right   "   C/SECTION, LOW TRANSVERSE      ORIF FEMUR FRACTURE            Current Outpatient Medications   Medication Sig Dispense Refill    albuterol (PROAIR HFA/PROVENTIL HFA/VENTOLIN HFA) 108 (90 Base) MCG/ACT inhaler INHALE 2 PUFFS BY MOUTH EVERY 6 HOURS AS NEEDED FOR WHEEZING 18 g 11    albuterol (PROVENTIL) (2.5 MG/3ML) 0.083% neb solution Take 1 vial (2.5 mg) by nebulization every 4 hours as needed for shortness of breath or wheezing. 75 mL 3    amLODIPine (NORVASC) 5 MG tablet Take 1 tablet (5 mg) by mouth daily. 90 tablet 3    aspirin 81 MG EC tablet Take 1 tablet (81 mg) by mouth daily      atorvastatin (LIPITOR) 20 MG tablet Take 0.5 tablets (10 mg) by mouth daily. 90 tablet 3    fluticasone (FLONASE) 50 MCG/ACT nasal spray Spray 2 sprays into both nostrils daily. 16 g 4    hydrOXYzine HCl (ATARAX) 25 MG tablet Take 1 tablet (25 mg) by mouth at bedtime. 90 tablet 3    predniSONE (DELTASONE) 10 MG tablet 4 tabs for 3 days. 3 tabs for 3 days. 2 tabs for 3 days. 1 tab for 3 days. 30 tablet 0    umeclidinium (INCRUSE ELLIPTA) 62.5 MCG/ACT inhaler INHALE 1 PUFF BY MOUTH EVERY DAY - (DISCARD INHALER 6 WEEKS AFTER OPENING FOIL TRAY OR WHEN EMPTY) Strength: 62.5 MCG/INH 3 each 3          Allergies   Allergen Reactions    Codeine Unknown    Gabapentin Other (See Comments)     \"feels spacey, woozy, dizzy, off-balance, not clear\"          Family History   Problem Relation Age of Onset    Cirrhosis Mother     Alcoholism Mother     Coronary Artery Disease Father     Alcoholism Father     Coronary Artery Disease Sister     Seizure Disorder Sister     Breast Cancer Sister     Hypertension Brother           Social History     Tobacco Use    Smoking status: Former     Current packs/day: 0.75     Average packs/day: 0.8 packs/day for 35.0 years (26.3 ttl pk-yrs)     Types: Cigarettes     Passive exposure: Past    Smokeless tobacco: Never    Tobacco comments:     using patches in between; given smokiing cessation information "   Substance Use Topics    Alcohol use: No     Comment: Alcoholic Drinks/day: 1 a year            Objective:  Physical Exam:  RLE: No pain with axial load or logroll of the hip.  No open wounds, lacerations, or prior surgical incisions about the knee.  No significant edema or ecchymosis.  No erythema or drainage.  Grade 1 effusion of the knee joint.  No pain to palpation over the quad tendon patella or patellar tendon.  Patellar grind positive for pain and crepitus.  Mild pain to palpation over the medial or lateral joint line.  No pain to palpation over the femoral origin or tibial insertion of the MCL.  No pain to palpation over the femoral origin or fibular insertion of the LCL.  Palpable popliteal cyst in the posterior joint line.  Passive range of motion 0 to 140 degrees of knee flexion.  Active range of motion is equivalent to passive range of motion.  5/5 strength in flexion and extension.  Stable to varus and valgus stress at 0 and 30 degrees of knee flexion with firm endpoint.  Negative Lachman.  Stable anterior posterior drawer.  Moustapha mildly positive for medial sided pain. Grossly neurovascular intact.    Imaging:  3 view x-ray the right knee from January 4, 2025 was reviewed.  This shows moderate narrowing of the medial joint space with.  The 50% joint space maintained.  Small medial femoral osteophyte.  Moderate narrowing of the lateral patellofemoral joint space.  Well-maintained lateral joint space.    Procedure:   Written informed consent for Right knee intra-articular injection was obtained from the patient after discussing the risk and benefits of the procedure.  Risk and benefits including but not limited to bleeding, infection, failure to cure pain and allergic reaction were discussed.  The anterior inferolateral portal was identified by palpation of bony landmarks.  The skin was cleaned with iodine solution.  Under sterile technique the anterior inferolateral portal was utilized to inject the  entirety of the steroid and lidocaine solution.  Soft dressings were applied.  Patient tolerated the procedure without difficulty.  I counseled the patient on signs and symptoms of allergic reaction and infection.     Large Joint Injection/Arthocentesis: R knee joint    Date/Time: 1/30/2025 11:14 AM    Performed by: Emmett Henry MD  Authorized by: Emmett Henry MD    Indications:  Pain  Needle Size:  22 G  Guidance: landmark guided    Approach:  Anterolateral  Location:  Knee      Medications:  40 mg triamcinolone 40 MG/ML; 7 mL lidocaine 1 %  Outcome:  Tolerated well, no immediate complications  Procedure discussed: discussed risks, benefits, and alternatives    Consent Given by:  Patient  Timeout: timeout called immediately prior to procedure    Prep: patient was prepped and draped in usual sterile fashion          Assessment and Plan: Patient is a 69-year-old female seen here today for evaluation of right knee pain.  She injured the knee approximate 3 weeks ago.  X-rays show mild to moderate medial patellofemoral arthritis.  Discussed with her today expected her symptoms are coming from early stage arthritis in her patellofemoral and medial joint space.  Certainly she is likely to also have a small meniscus tear on the medial side of her knee.  Discussed treatment options.  Discussed nonoperative management is the first-line treatment for both his diagnosis.  Discussed nonoperative management in form of oral anti-inflammatory medication, activity modification, the role of physical therapy, bracing, the role of intra-articular injection.  I discussed with her that if nonoperative management does not provide symptomatic relief, she may be a candidate for an arthroscopic procedure, would certainly need an MRI to confirm diagnosis prior to that.  I did obtain answer questions.    At this time she would like to start with nonoperative measures.  I think that is very reasonable.  We discussed the risks and benefits  of an intra-articular injection including but limited to allergic reaction, failure to cure pain, and infection.  I counseled on signs and symptoms of allergic reaction and infection. a right knee intra-articular corticosteroid injection was performed in clinic as highlighted above.  She tolerated the procedure without difficulty.  She was fitted for a hinged range of motion brace today.  Will start physical therapy for active and passive range of motion as well as core quad and hamstring strengthening.  She will follow-up with me in 6 to 8 weeks in clinic.    Emmett Henry MD    [facility]   Department of Orthopedic Surgery      Disclaimer: This note consists of symbols derived from keyboarding, dictation and/or voice recognition software. As a result, there may be errors in the script that have gone undetected. Please consider this when interpreting information found in this chart.

## 2025-01-30 NOTE — PATIENT INSTRUCTIONS
[facility]   [address] [place] [address], [address]  [place] [address] [address] [place] [address]   Appointments: [phone] Appointments: [phone]   Fax: [phone] Fax: [phone]       1. Right knee pain            PHYSICAL THERAPY:  Physical Therapy orders have been placed with [facility].  You can call [phone] to schedule at your convenience.     Follow up 6-8 weeks   Call my office with any questions or concerns, [phone].

## 2025-02-08 NOTE — ED PROVIDER NOTES
"Emergency Department Encounter   NAME: Nettie Paul  AGE: 69 year old female   YOB: 1955 ;   MRN: [id number] ;    ED PROVIDER: Marjorie Evans PA-C    PCP: Latosha Frederick    Evaluation Date & Time:   1/4/2025  5:22 PM    CHIEF COMPLAINT:  Knee Pain      FINAL IMPRESSION:    ICD-10-CM    1. Synovial cyst of right popliteal space  M71.21 Orthopedic  Referral            IMPRESSION AND PLAN   MDM: Nettie Paul is a 69 year old female with a pertinent history of HLD, hypothyroidism, COPD, vitamin D deficiency, KEITH, insomnia, migraines, hepatitis C, gout who presents to the ED by walk-in for evaluation of R knee and calf pain after hearing a \"pop\" while stepping up into her truck yesterday.    Vitals stable. On exam patient is uncomfortable appearing due to pain. There is R posterior knee and calf pain to palpation. No obvious joint swelling or effusion. No warmth, edema or erythema to suggest cellulitis, septic joint or gout flare. No fluctuance concerning for abscess. There is pain with flexion and extension of the R knee. Differentials include popliteal cyst, muscle spasm vs strain, bony abnormality. Low suspicion for DVT given lack of unilateral swelling or erythema.     Patient given oxycodone for pain management. US does confirm a popliteal cyst, otherwise negative for DVT or superficial thrombophlebitis. XR reveals degenerative changes and a small joint effusion, but negative for acute fracture or dislocation. Upon reevaluation, patient is resting comfortably in lobby due to boarding crisis. I discussed with patient that symptomatology and workup are most consistent with popliteal cyst. For this, I recommended compression with an ace wrap, rest and NSAIDs as needed. I will also provide a few tabs of oxycodone as patient has difficulty walking due to pain. I also provided a referral to orthopedics to discuss further management including surgery in the event patient's symptoms are " not controlled with conservative management. Patient and daughter are agreeable to this plan and feel comfortable with discharge at this time.       Medical Decision Making  Obtained supplemental history:Supplemental history obtained?: Family Member/Significant Other  Reviewed external records: External records reviewed?: No  Care impacted by chronic illness:Documented in Chart  Care significantly affected by social determinants of health:N/A  Did you consider but not order tests?: Work up considered but not performed and documented in chart, if applicable  Did you interpret images independently?: Independent interpretation of ECG and images noted in documentation, when applicable.  Consultation discussion with other provider:Did you involve another provider (consultant, , pharmacy, etc.)?: No  Discharge. I prescribed additional prescription strength medication(s) as charted. See documentation for any additional details.    Not Applicable       ED COURSE:  1:36 PM I met and introduced myself to the patient. I gathered initial history and performed my physical exam. We discussed plan for initial workup.   4:57 PM I rechecked the patient and discussed results, discharge, follow up, and reasons to return to the ED.           MEDICATIONS GIVEN IN THE EMERGENCY DEPARTMENT:  Medications   oxyCODONE (ROXICODONE) tablet 5 mg (5 mg Oral $Given 1/4/25 1516)   ondansetron (ZOFRAN ODT) ODT tab 4 mg (4 mg Oral $Given 1/4/25 1516)         NEW PRESCRIPTIONS STARTED AT TODAY'S ED VISIT:  Discharge Medication List as of 1/4/2025  5:22 PM        START taking these medications    Details   oxyCODONE (ROXICODONE) 5 MG tablet Take 1 tablet (5 mg) by mouth every 6 hours as needed., Disp-8 tablet, R-0, Local Print               BRIEF HPI   Patient information was obtained from: Patient and Daughter  Use of Intrepreter: N/A     Nettie Paul is a 69 year old female who presents to the ED with R knee pain. She notes that while she  "was getting into her truck yesterday when she felt a \"pop\" in her knee. She does have a history of arthritis and has had surgery in that knee in the past. She did take ibuprofen last night with minimal relief in her pain. She otherwise denies numbness, tingling, weakness.       REVIEW OF SYSTEMS:  Pertinent positive and negative symptoms per HPI.       MEDICAL HISTORY     Past Medical History:   Diagnosis Date    COPD (chronic obstructive pulmonary disease) (H)     Dyslexia, developmental     GERD (gastroesophageal reflux disease)     Hep C w/o coma, chronic (H)      (normal spontaneous vaginal delivery)        Past Surgical History:   Procedure Laterality Date    ARTHROSCOPY HIP      ARTHROSCOPY SHOULDER ROTATOR CUFF REPAIR Right     C/SECTION, LOW TRANSVERSE      ORIF FEMUR FRACTURE         Family History   Problem Relation Age of Onset    Cirrhosis Mother     Alcoholism Mother     Coronary Artery Disease Father     Alcoholism Father     Coronary Artery Disease Sister     Seizure Disorder Sister     Breast Cancer Sister     Hypertension Brother        Social History     Tobacco Use    Smoking status: Former     Current packs/day: 0.75     Average packs/day: 0.8 packs/day for 35.0 years (26.3 ttl pk-yrs)     Types: Cigarettes     Passive exposure: Past    Smokeless tobacco: Never    Tobacco comments:     using patches in between; given smokiing cessation information   Vaping Use    Vaping status: Never Used   Substance Use Topics    Alcohol use: No     Comment: Alcoholic Drinks/day: 1 a year    Drug use: No         PHYSICAL EXAM     First Vitals:  /75   Pulse 68   Temp 98.8  F (37.1  C) (Oral)   Resp 16   Ht 1.575 m (5' 2\")   Wt 64.4 kg (142 lb)   SpO2 93%   BMI 25.97 kg/m        PHYSICAL EXAM:  Physical Exam  Vitals and nursing note reviewed.   Constitutional:       General: She is not in acute distress.     Appearance: Normal appearance. She is not diaphoretic.   HENT:      Head: Normocephalic " and atraumatic.      Mouth/Throat:      Mouth: Mucous membranes are moist.   Eyes:      Conjunctiva/sclera: Conjunctivae normal.   Cardiovascular:      Rate and Rhythm: Normal rate.      Pulses:           Dorsalis pedis pulses are 2+ on the right side.   Pulmonary:      Effort: Pulmonary effort is normal.   Musculoskeletal:      Cervical back: Normal range of motion and neck supple.      Right knee: Bony tenderness present. No swelling or deformity. Decreased range of motion. Tenderness present.      Left knee: Normal.      Right lower leg: Tenderness present. No swelling. No edema.      Right ankle: Normal.   Skin:     General: Skin is warm and dry.      Findings: No rash.   Neurological:      General: No focal deficit present.      Mental Status: She is alert and oriented to person, place, and time.   Psychiatric:         Mood and Affect: Mood normal.          RESULTS     LAB:  All pertinent labs reviewed and interpreted  Labs Ordered and Resulted from Time of ED Arrival to Time of ED Departure - No data to display      RADIOLOGY:  US Lower Extremity Venous Duplex Right   Final Result   IMPRESSION:   1.  No deep venous thrombosis in the right lower extremity.      XR Knee Right 3 Views   Final Result   IMPRESSION: No acute fracture or malalignment. Minimal medial and patellofemoral compartment degenerative changes. Small knee joint effusion. Remote healed proximal tibial and fibular diaphysis fractures.              Marjroie Evans PA-C  Emergency Medicine   [facility] EMERGENCY ROOM       Marjorie Evans PA-C  02/08/25 3545

## 2025-02-27 ENCOUNTER — OFFICE VISIT (OUTPATIENT)
Dept: FAMILY MEDICINE | Facility: CLINIC | Age: 70
End: 2025-02-27
Payer: MEDICARE

## 2025-02-27 VITALS
WEIGHT: 151 LBS | BODY MASS INDEX: 26.75 KG/M2 | DIASTOLIC BLOOD PRESSURE: 80 MMHG | RESPIRATION RATE: 20 BRPM | HEIGHT: 63 IN | HEART RATE: 84 BPM | OXYGEN SATURATION: 98 % | SYSTOLIC BLOOD PRESSURE: 130 MMHG | TEMPERATURE: 98.8 F

## 2025-02-27 DIAGNOSIS — M54.50 CHRONIC LEFT-SIDED LOW BACK PAIN WITHOUT SCIATICA: Primary | ICD-10-CM

## 2025-02-27 DIAGNOSIS — G89.29 CHRONIC LEFT-SIDED LOW BACK PAIN WITHOUT SCIATICA: Primary | ICD-10-CM

## 2025-02-27 RX ORDER — CYCLOBENZAPRINE HCL 10 MG
10 TABLET ORAL
Qty: 30 TABLET | Refills: 3 | Status: SHIPPED | OUTPATIENT
Start: 2025-02-27

## 2025-02-27 ASSESSMENT — PATIENT HEALTH QUESTIONNAIRE - PHQ9
10. IF YOU CHECKED OFF ANY PROBLEMS, HOW DIFFICULT HAVE THESE PROBLEMS MADE IT FOR YOU TO DO YOUR WORK, TAKE CARE OF THINGS AT HOME, OR GET ALONG WITH OTHER PEOPLE: NOT DIFFICULT AT ALL
SUM OF ALL RESPONSES TO PHQ QUESTIONS 1-9: 2
SUM OF ALL RESPONSES TO PHQ QUESTIONS 1-9: 2

## 2025-02-27 NOTE — PROGRESS NOTES
Assessment & Plan     (M54.50,  G89.29) Chronic left-sided low back pain without sciatica  (primary encounter diagnosis)  Comment: pt has left low back pain for 3-4 month. Sharp and cramping pain. Pain goes to left groin. Night time is worse. Sitting for long time then start to walk hurts too. No injury and heavy lifting. She tried otc lidocaine patch that helped. No shooting pain and numbness and tingling to leg. Good bladder and bm control. No dysuria, urine urgency, blood in urine and cloudy urine. No fever and abd pain. Exam: mild left low back pain. Lumbar spine  x ray at 2/8/2025 reviewed. Likely arthritis vs muscle strain.   Plan: cyclobenzaprine (FLEXERIL) 10 MG tablet,         Physical Therapy  Referral        Advise avoid heavy lifting. Rest and otc cbd cream or arthritis cream. Rom exercise as tolerated. Will have pt. If pain gets worse or no improvement will consider mri.   She is trying to quit smoke    The longitudinal plan of care for the diagnosis(es)/condition(s) as documented were addressed during this visit. Due to the added complexity in care, I will continue to support Nettie in the subsequent management and with ongoing continuity of care.      See Patient Instructions    Subjective   Nettie is a 70 year old, presenting for the following health issues:  Musculoskeletal Problem (Ongoing left hip pain and low back pain saw ortho UC on 2/8 and was given prednisone and a muscle relaxer had x rays and was told she had a lot of swelling and arthritis in that area )        2/27/2025    12:54 PM   Additional Questions   Roomed by Gen linsey CMA     Musculoskeletal Problem    History of Present Illness       Reason for visit:  Pain  Symptom onset:  3-4 weeks ago  Symptom intensity:  Severe  Symptom progression:  Staying the same  Had these symptoms before:  No  What makes it worse:  Sitting  What makes it better:  Not yet   She is taking medications regularly.         Chronic/Recurring Back Pain  "Follow Up    Where is your back pain located? (Select all that apply) low back left  How would you describe your back pain?  cramping and sharp  Where does your back pain spread? nowhere  Since your last clinic visit for back pain, how has your pain changed? unchanged  Does your back pain interfere with your job? No  Since your last visit, have you tried any new treatment? No        Review of Systems  Constitutional, HEENT, cardiovascular, pulmonary, gi and gu systems are negative, except as otherwise noted.      Objective    /80 (BP Location: Left arm, Patient Position: Sitting, Cuff Size: Adult Regular)   Pulse 84   Temp 98.8  F (37.1  C) (Oral)   Resp 20   Ht 1.588 m (5' 2.5\")   Wt 68.5 kg (151 lb)   SpO2 98%   BMI 27.18 kg/m    Body mass index is 27.18 kg/m .  Physical Exam   GENERAL: alert and no distress  NECK: no adenopathy, no asymmetry, masses, or scars  RESP: lungs clear to auscultation - no rales, rhonchi or wheezes  CV: regular rate and rhythm, normal S1 S2, no S3 or S4, no murmur, click or rub, no peripheral edema  ABDOMEN: soft, nontender, no hepatosplenomegaly, no masses and bowel sounds normal  MS: no gross musculoskeletal defects noted, no edema   Cervical, thoracic and lumbar spine exam  with mild  tenderness on left side,  no masses or kyphoscoliosis. Full range of motion with  pain is noted.          Signed Electronically by: Carina Locke MD    "

## 2025-07-01 ENCOUNTER — VIRTUAL VISIT (OUTPATIENT)
Dept: FAMILY MEDICINE | Facility: CLINIC | Age: 70
End: 2025-07-01
Payer: MEDICARE

## 2025-07-01 DIAGNOSIS — M62.838 MUSCLE SPASM: Primary | ICD-10-CM

## 2025-07-01 DIAGNOSIS — J44.9 CHRONIC OBSTRUCTIVE PULMONARY DISEASE, UNSPECIFIED COPD TYPE (H): Chronic | ICD-10-CM

## 2025-07-01 DIAGNOSIS — J44.1 COPD EXACERBATION (H): ICD-10-CM

## 2025-07-01 PROCEDURE — 98014 SYNCH AUDIO-ONLY EST MOD 30: CPT

## 2025-07-01 RX ORDER — PREDNISONE 10 MG/1
TABLET ORAL
Qty: 30 TABLET | Refills: 0 | Status: SHIPPED | OUTPATIENT
Start: 2025-07-01

## 2025-07-01 RX ORDER — FLUTICASONE PROPIONATE 50 MCG
2 SPRAY, SUSPENSION (ML) NASAL DAILY
Qty: 16 G | Refills: 4 | Status: SHIPPED | OUTPATIENT
Start: 2025-07-01

## 2025-07-01 RX ORDER — ALBUTEROL SULFATE 90 UG/1
INHALANT RESPIRATORY (INHALATION)
Qty: 18 G | Refills: 11 | Status: SHIPPED | OUTPATIENT
Start: 2025-07-01

## 2025-07-01 RX ORDER — CYCLOBENZAPRINE HCL 10 MG
10 TABLET ORAL
Qty: 30 TABLET | Refills: 11 | Status: SHIPPED | OUTPATIENT
Start: 2025-07-01

## 2025-07-01 NOTE — PROGRESS NOTES
Nettie is a 70 year old who is being evaluated via a billable telephone visit.      Originating Location (pt. Location): Home    Distant Location (provider location):  On-site  Telephone visit completed due to the patient did not consent to a video visit.    Assessment & Plan     Chronic obstructive pulmonary disease, unspecified COPD type (H)  COPD exacerbation (H)  Needing refills of her inhalers today and nasal spray.   Noticing some increased wheezing and SOB secondary to poor air quality and likely the heat. Will have her restart a prednisone taper. Return precautions reviewed.  - fluticasone (FLONASE) 50 MCG/ACT nasal spray  Dispense: 16 g; Refill: 4  - albuterol (PROAIR HFA/PROVENTIL HFA/VENTOLIN HFA) 108 (90 Base) MCG/ACT inhaler  Dispense: 18 g; Refill: 11  - umeclidinium (INCRUSE ELLIPTA) 62.5 MCG/ACT inhaler  Dispense: 3 each; Refill: 3  - predniSONE (DELTASONE) 10 MG tablet  Dispense: 30 tablet; Refill: 0    Muscle spasm  Noting in bilateral hands, feet, and legs. Worse at night and will often have to get out of bed and walk around for relief.  PLAN:  Continue to stay well hydrated and being active during the day.  Ok to continue oral magnesium supplement.  Ok to continue with cyclobenzaprine prn.   Trial topical theraworx foam BID to see if this helps symptoms.  - Homeopathic Products (THERAWORX RELIEF/CRAMP/SPASM) FOAM  Dispense: 506 mL; Refill: 3  - cyclobenzaprine (FLEXERIL) 10 MG tablet  Dispense: 30 tablet; Refill: 11      Subjective   Nettie is a 70 year old, presenting for the following health issues:  Contractions (Calves, feet, toes, hands and arms. Pt has to massage out. Pt states worse at night in legs and feet. )        7/1/2025     9:50 AM   Additional Questions   Roomed by Sujata UGARTE LPN     History of Present Illness       Reason for visit:  Cramping  Symptom onset:  3-4 weeks ago  Symptoms include:  Leg, feet, toes and hands cramping.  Symptom intensity:  Moderate  Symptom progression:   Worsening  Had these symptoms before:  Yes  Has tried/received treatment for these symptoms:  Yes  Previous treatment was successful:  Yes  Prior treatment description:  Cyclobenzaprine  What makes it worse:  Worse after gout flare up.   She is taking medications regularly.        Having muscle cramps during the day and the evening. Will need to wake up in the middle of the night to help relieve the cramps. Will happen in her bilateral feet, hands and legs.     Was given some cyclobenzaprine prn to see if this helps. This does seem to help her symptoms.     States she is staying well hydrated during the day. States she is drinking more water than she has ever drank.     Takes a magnesium supplement at night. States her  has similar symptoms and is treated with gabapentin.     Denies the sensation to need to move her legs at night. States she is active during the day.       COPD: needing refills of her albuterol and ellipta inhalers. Also needing refills of the flonase nasal spray. Lately feeling like she is more wheezy and SOB. Would like to refill her oral prednisone.       Review of Systems  Constitutional, HEENT, cardiovascular, pulmonary, gi and gu systems are negative, except as otherwise noted.      Objective         Vitals:  No vitals were obtained today due to virtual visit.    Physical Exam   General: Alert and no distress //Respiratory: No audible wheeze, cough, or shortness of breath // Psychiatric:  Appropriate affect, tone, and pace of words          Phone call duration: 20 minutes  Signed Electronically by: Latosha Frederick PA-C

## 2025-08-09 ENCOUNTER — PATIENT OUTREACH (OUTPATIENT)
Dept: CARE COORDINATION | Facility: CLINIC | Age: 70
End: 2025-08-09
Payer: OTHER GOVERNMENT

## 2025-08-18 ENCOUNTER — OFFICE VISIT (OUTPATIENT)
Dept: FAMILY MEDICINE | Facility: CLINIC | Age: 70
End: 2025-08-18
Payer: OTHER GOVERNMENT

## 2025-08-18 ENCOUNTER — PATIENT OUTREACH (OUTPATIENT)
Dept: CARE COORDINATION | Facility: CLINIC | Age: 70
End: 2025-08-18

## 2025-08-18 VITALS
OXYGEN SATURATION: 96 % | HEIGHT: 63 IN | RESPIRATION RATE: 12 BRPM | HEART RATE: 79 BPM | BODY MASS INDEX: 25.59 KG/M2 | DIASTOLIC BLOOD PRESSURE: 84 MMHG | WEIGHT: 144.4 LBS | TEMPERATURE: 98.1 F | SYSTOLIC BLOOD PRESSURE: 138 MMHG

## 2025-08-18 DIAGNOSIS — E78.2 MIXED HYPERLIPIDEMIA: ICD-10-CM

## 2025-08-18 DIAGNOSIS — J44.1 COPD EXACERBATION (H): ICD-10-CM

## 2025-08-18 DIAGNOSIS — J44.9 CHRONIC OBSTRUCTIVE PULMONARY DISEASE, UNSPECIFIED COPD TYPE (H): Chronic | ICD-10-CM

## 2025-08-18 DIAGNOSIS — E03.9 ACQUIRED HYPOTHYROIDISM: ICD-10-CM

## 2025-08-18 DIAGNOSIS — F41.9 ANXIETY: ICD-10-CM

## 2025-08-18 DIAGNOSIS — Z12.31 VISIT FOR SCREENING MAMMOGRAM: ICD-10-CM

## 2025-08-18 DIAGNOSIS — R73.09 ELEVATED GLUCOSE: ICD-10-CM

## 2025-08-18 DIAGNOSIS — I10 PRIMARY HYPERTENSION: Primary | ICD-10-CM

## 2025-08-18 LAB
ERYTHROCYTE [DISTWIDTH] IN BLOOD BY AUTOMATED COUNT: 12.7 % (ref 10–15)
EST. AVERAGE GLUCOSE BLD GHB EST-MCNC: 131 MG/DL
HBA1C MFR BLD: 6.2 % (ref 0–5.6)
HCT VFR BLD AUTO: 50.1 % (ref 35–47)
HGB BLD-MCNC: 16.3 G/DL (ref 11.7–15.7)
MCH RBC QN AUTO: 27.5 PG (ref 26.5–33)
MCHC RBC AUTO-ENTMCNC: 32.5 G/DL (ref 31.5–36.5)
MCV RBC AUTO: 84.5 FL (ref 78–100)
PLATELET # BLD AUTO: 318 10E3/UL (ref 150–450)
RBC # BLD AUTO: 5.93 10E6/UL (ref 3.8–5.2)
WBC # BLD AUTO: 10.31 10E3/UL (ref 4–11)

## 2025-08-18 PROCEDURE — 83036 HEMOGLOBIN GLYCOSYLATED A1C: CPT | Performed by: NURSE PRACTITIONER

## 2025-08-18 PROCEDURE — 85027 COMPLETE CBC AUTOMATED: CPT | Performed by: NURSE PRACTITIONER

## 2025-08-18 PROCEDURE — 80053 COMPREHEN METABOLIC PANEL: CPT | Performed by: NURSE PRACTITIONER

## 2025-08-18 PROCEDURE — 99214 OFFICE O/P EST MOD 30 MIN: CPT | Performed by: NURSE PRACTITIONER

## 2025-08-18 PROCEDURE — 96127 BRIEF EMOTIONAL/BEHAV ASSMT: CPT | Performed by: NURSE PRACTITIONER

## 2025-08-18 PROCEDURE — 84443 ASSAY THYROID STIM HORMONE: CPT | Performed by: NURSE PRACTITIONER

## 2025-08-18 PROCEDURE — 80061 LIPID PANEL: CPT | Performed by: NURSE PRACTITIONER

## 2025-08-18 PROCEDURE — 36415 COLL VENOUS BLD VENIPUNCTURE: CPT | Performed by: NURSE PRACTITIONER

## 2025-08-18 RX ORDER — FLUTICASONE PROPIONATE 50 MCG
2 SPRAY, SUSPENSION (ML) NASAL DAILY
Qty: 16 G | Refills: 4 | Status: SHIPPED | OUTPATIENT
Start: 2025-08-18

## 2025-08-18 RX ORDER — ALBUTEROL SULFATE 0.83 MG/ML
2.5 SOLUTION RESPIRATORY (INHALATION) EVERY 4 HOURS PRN
Qty: 75 ML | Refills: 3 | Status: SHIPPED | OUTPATIENT
Start: 2025-08-18

## 2025-08-18 RX ORDER — HYDROXYZINE HYDROCHLORIDE 25 MG/1
25 TABLET, FILM COATED ORAL AT BEDTIME
Qty: 90 TABLET | Refills: 3 | Status: SHIPPED | OUTPATIENT
Start: 2025-08-18

## 2025-08-18 RX ORDER — PREDNISONE 10 MG/1
TABLET ORAL
Qty: 30 TABLET | Refills: 0 | Status: SHIPPED | OUTPATIENT
Start: 2025-08-18

## 2025-08-18 RX ORDER — ATORVASTATIN CALCIUM 20 MG/1
10 TABLET, FILM COATED ORAL DAILY
Qty: 45 TABLET | Refills: 3 | Status: SHIPPED | OUTPATIENT
Start: 2025-08-18

## 2025-08-18 RX ORDER — AMLODIPINE BESYLATE 5 MG/1
5 TABLET ORAL DAILY
Qty: 90 TABLET | Refills: 3 | Status: SHIPPED | OUTPATIENT
Start: 2025-08-18

## 2025-08-18 ASSESSMENT — ANXIETY QUESTIONNAIRES
GAD7 TOTAL SCORE: 1
2. NOT BEING ABLE TO STOP OR CONTROL WORRYING: NOT AT ALL
GAD7 TOTAL SCORE: 1
6. BECOMING EASILY ANNOYED OR IRRITABLE: NOT AT ALL
IF YOU CHECKED OFF ANY PROBLEMS ON THIS QUESTIONNAIRE, HOW DIFFICULT HAVE THESE PROBLEMS MADE IT FOR YOU TO DO YOUR WORK, TAKE CARE OF THINGS AT HOME, OR GET ALONG WITH OTHER PEOPLE: SOMEWHAT DIFFICULT
5. BEING SO RESTLESS THAT IT IS HARD TO SIT STILL: NOT AT ALL
3. WORRYING TOO MUCH ABOUT DIFFERENT THINGS: NOT AT ALL
GAD7 TOTAL SCORE: 1
1. FEELING NERVOUS, ANXIOUS, OR ON EDGE: NOT AT ALL
7. FEELING AFRAID AS IF SOMETHING AWFUL MIGHT HAPPEN: NOT AT ALL
4. TROUBLE RELAXING: SEVERAL DAYS
8. IF YOU CHECKED OFF ANY PROBLEMS, HOW DIFFICULT HAVE THESE MADE IT FOR YOU TO DO YOUR WORK, TAKE CARE OF THINGS AT HOME, OR GET ALONG WITH OTHER PEOPLE?: SOMEWHAT DIFFICULT
7. FEELING AFRAID AS IF SOMETHING AWFUL MIGHT HAPPEN: NOT AT ALL

## 2025-08-18 ASSESSMENT — PATIENT HEALTH QUESTIONNAIRE - PHQ9
SUM OF ALL RESPONSES TO PHQ QUESTIONS 1-9: 2
SUM OF ALL RESPONSES TO PHQ QUESTIONS 1-9: 2
10. IF YOU CHECKED OFF ANY PROBLEMS, HOW DIFFICULT HAVE THESE PROBLEMS MADE IT FOR YOU TO DO YOUR WORK, TAKE CARE OF THINGS AT HOME, OR GET ALONG WITH OTHER PEOPLE: NOT DIFFICULT AT ALL

## 2025-08-18 ASSESSMENT — PAIN SCALES - GENERAL: PAINLEVEL_OUTOF10: NO PAIN (0)

## 2025-08-19 ENCOUNTER — PATIENT OUTREACH (OUTPATIENT)
Dept: CARE COORDINATION | Facility: CLINIC | Age: 70
End: 2025-08-19
Payer: OTHER GOVERNMENT

## 2025-08-19 LAB
ALBUMIN SERPL BCG-MCNC: 4.5 G/DL (ref 3.5–5.2)
ALP SERPL-CCNC: 95 U/L (ref 40–150)
ALT SERPL W P-5'-P-CCNC: 22 U/L (ref 0–50)
ANION GAP SERPL CALCULATED.3IONS-SCNC: 12 MMOL/L (ref 7–15)
AST SERPL W P-5'-P-CCNC: 45 U/L (ref 0–45)
BILIRUB SERPL-MCNC: 0.3 MG/DL
BUN SERPL-MCNC: 13.4 MG/DL (ref 8–23)
CALCIUM SERPL-MCNC: 10 MG/DL (ref 8.8–10.4)
CHLORIDE SERPL-SCNC: 104 MMOL/L (ref 98–107)
CHOLEST SERPL-MCNC: 177 MG/DL
CREAT SERPL-MCNC: 0.62 MG/DL (ref 0.51–0.95)
EGFRCR SERPLBLD CKD-EPI 2021: >90 ML/MIN/1.73M2
FASTING STATUS PATIENT QL REPORTED: YES
FASTING STATUS PATIENT QL REPORTED: YES
GLUCOSE SERPL-MCNC: 89 MG/DL (ref 70–99)
HCO3 SERPL-SCNC: 21 MMOL/L (ref 22–29)
HDLC SERPL-MCNC: 50 MG/DL
LDLC SERPL CALC-MCNC: 106 MG/DL
NONHDLC SERPL-MCNC: 127 MG/DL
POTASSIUM SERPL-SCNC: 4.9 MMOL/L (ref 3.4–5.3)
PROT SERPL-MCNC: 8.1 G/DL (ref 6.4–8.3)
SODIUM SERPL-SCNC: 137 MMOL/L (ref 135–145)
TRIGL SERPL-MCNC: 107 MG/DL
TSH SERPL DL<=0.005 MIU/L-ACNC: 3.21 UIU/ML (ref 0.3–4.2)

## 2025-08-20 ENCOUNTER — PATIENT OUTREACH (OUTPATIENT)
Dept: ONCOLOGY | Facility: CLINIC | Age: 70
End: 2025-08-20
Payer: OTHER GOVERNMENT

## 2025-09-01 ENCOUNTER — PATIENT OUTREACH (OUTPATIENT)
Dept: CARE COORDINATION | Facility: CLINIC | Age: 70
End: 2025-09-01
Payer: OTHER GOVERNMENT